# Patient Record
Sex: MALE | Race: WHITE
[De-identification: names, ages, dates, MRNs, and addresses within clinical notes are randomized per-mention and may not be internally consistent; named-entity substitution may affect disease eponyms.]

---

## 2018-01-25 ENCOUNTER — HOSPITAL ENCOUNTER (INPATIENT)
Dept: HOSPITAL 17 - NEPE | Age: 68
LOS: 18 days | Discharge: HOSPICE | DRG: 356 | End: 2018-02-12
Attending: INTERNAL MEDICINE | Admitting: INTERNAL MEDICINE
Payer: MEDICARE

## 2018-01-25 VITALS
OXYGEN SATURATION: 94 % | RESPIRATION RATE: 13 BRPM | TEMPERATURE: 97.6 F | SYSTOLIC BLOOD PRESSURE: 77 MMHG | DIASTOLIC BLOOD PRESSURE: 49 MMHG | HEART RATE: 60 BPM

## 2018-01-25 VITALS
SYSTOLIC BLOOD PRESSURE: 106 MMHG | RESPIRATION RATE: 18 BRPM | OXYGEN SATURATION: 98 % | HEART RATE: 91 BPM | DIASTOLIC BLOOD PRESSURE: 51 MMHG

## 2018-01-25 VITALS
OXYGEN SATURATION: 96 % | DIASTOLIC BLOOD PRESSURE: 50 MMHG | RESPIRATION RATE: 18 BRPM | HEART RATE: 86 BPM | SYSTOLIC BLOOD PRESSURE: 98 MMHG

## 2018-01-25 VITALS — BODY MASS INDEX: 28.24 KG/M2 | HEIGHT: 66 IN | WEIGHT: 175.71 LBS

## 2018-01-25 VITALS — OXYGEN SATURATION: 94 %

## 2018-01-25 DIAGNOSIS — L97.419: ICD-10-CM

## 2018-01-25 DIAGNOSIS — K20.9: ICD-10-CM

## 2018-01-25 DIAGNOSIS — I48.91: ICD-10-CM

## 2018-01-25 DIAGNOSIS — D69.6: ICD-10-CM

## 2018-01-25 DIAGNOSIS — D62: ICD-10-CM

## 2018-01-25 DIAGNOSIS — L97.519: ICD-10-CM

## 2018-01-25 DIAGNOSIS — Z96.642: ICD-10-CM

## 2018-01-25 DIAGNOSIS — Z86.73: ICD-10-CM

## 2018-01-25 DIAGNOSIS — T85.71XA: ICD-10-CM

## 2018-01-25 DIAGNOSIS — K26.4: Primary | ICD-10-CM

## 2018-01-25 DIAGNOSIS — B37.9: ICD-10-CM

## 2018-01-25 DIAGNOSIS — F54: ICD-10-CM

## 2018-01-25 DIAGNOSIS — I12.0: ICD-10-CM

## 2018-01-25 DIAGNOSIS — M19.90: ICD-10-CM

## 2018-01-25 DIAGNOSIS — F17.210: ICD-10-CM

## 2018-01-25 DIAGNOSIS — E11.621: ICD-10-CM

## 2018-01-25 DIAGNOSIS — D63.8: ICD-10-CM

## 2018-01-25 DIAGNOSIS — Z99.2: ICD-10-CM

## 2018-01-25 DIAGNOSIS — A41.9: ICD-10-CM

## 2018-01-25 DIAGNOSIS — K65.9: ICD-10-CM

## 2018-01-25 DIAGNOSIS — L89.151: ICD-10-CM

## 2018-01-25 DIAGNOSIS — I95.9: ICD-10-CM

## 2018-01-25 DIAGNOSIS — F32.9: ICD-10-CM

## 2018-01-25 DIAGNOSIS — J90: ICD-10-CM

## 2018-01-25 DIAGNOSIS — I70.8: ICD-10-CM

## 2018-01-25 DIAGNOSIS — I27.20: ICD-10-CM

## 2018-01-25 DIAGNOSIS — N18.6: ICD-10-CM

## 2018-01-25 DIAGNOSIS — G89.4: ICD-10-CM

## 2018-01-25 DIAGNOSIS — G93.40: ICD-10-CM
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
## 2018-01-25 DIAGNOSIS — E11.22: ICD-10-CM

## 2018-01-25 DIAGNOSIS — L97.429: ICD-10-CM

## 2018-01-25 DIAGNOSIS — J96.00: ICD-10-CM

## 2018-01-25 DIAGNOSIS — I87.2: ICD-10-CM

## 2018-01-25 DIAGNOSIS — K29.70: ICD-10-CM

## 2018-01-25 DIAGNOSIS — I87.8: ICD-10-CM

## 2018-01-25 LAB
ALBUMIN SERPL-MCNC: 1.4 GM/DL (ref 3.4–5)
ALP SERPL-CCNC: 414 U/L (ref 45–117)
ALT SERPL-CCNC: 17 U/L (ref 12–78)
AST SERPL-CCNC: 22 U/L (ref 15–37)
BASOPHILS # BLD AUTO: 0 TH/MM3 (ref 0–0.2)
BASOPHILS NFR BLD: 0.2 % (ref 0–2)
BILIRUB SERPL-MCNC: 0.4 MG/DL (ref 0.2–1)
BUN SERPL-MCNC: 57 MG/DL (ref 7–18)
CALCIUM SERPL-MCNC: 7.2 MG/DL (ref 8.5–10.1)
CALCIUM TP COR SERPL-MCNC: 8.2 MG/DL (ref 8.5–10.1)
CHLORIDE SERPL-SCNC: 100 MEQ/L (ref 98–107)
CREAT SERPL-MCNC: 5.39 MG/DL (ref 0.6–1.3)
EOSINOPHIL # BLD: 0 TH/MM3 (ref 0–0.4)
EOSINOPHIL NFR BLD: 0.3 % (ref 0–4)
ERYTHROCYTE [DISTWIDTH] IN BLOOD BY AUTOMATED COUNT: 17.7 % (ref 11.6–17.2)
GFR SERPLBLD BASED ON 1.73 SQ M-ARVRAT: 11 ML/MIN (ref 89–?)
GLUCOSE SERPL-MCNC: 163 MG/DL (ref 74–106)
HCO3 BLD-SCNC: 30.9 MEQ/L (ref 21–32)
HCT VFR BLD CALC: 20.8 % (ref 39–51)
HGB BLD-MCNC: 6.7 GM/DL (ref 13–17)
INR PPP: 1.8 RATIO
LYMPHOCYTES # BLD AUTO: 0.4 TH/MM3 (ref 1–4.8)
LYMPHOCYTES NFR BLD AUTO: 3.9 % (ref 9–44)
MCH RBC QN AUTO: 33.4 PG (ref 27–34)
MCHC RBC AUTO-ENTMCNC: 31.9 % (ref 32–36)
MCV RBC AUTO: 104.8 FL (ref 80–100)
MONOCYTE #: 0.5 TH/MM3 (ref 0–0.9)
MONOCYTES NFR BLD: 4.7 % (ref 0–8)
NEUTROPHILS # BLD AUTO: 10.4 TH/MM3 (ref 1.8–7.7)
NEUTROPHILS NFR BLD AUTO: 90.9 % (ref 16–70)
PLATELET # BLD: 189 TH/MM3 (ref 150–450)
PMV BLD AUTO: 8 FL (ref 7–11)
PROT SERPL-MCNC: 5.2 GM/DL (ref 6.4–8.2)
PROTHROMBIN TIME: 18 SEC (ref 9.8–11.6)
RBC # BLD AUTO: 1.99 MIL/MM3 (ref 4.5–5.9)
SODIUM SERPL-SCNC: 139 MEQ/L (ref 136–145)
TROPONIN I SERPL-MCNC: 0.05 NG/ML (ref 0.02–0.05)
WBC # BLD AUTO: 11.4 TH/MM3 (ref 4–11)

## 2018-01-25 PROCEDURE — 84443 ASSAY THYROID STIM HORMONE: CPT

## 2018-01-25 PROCEDURE — 86901 BLOOD TYPING SEROLOGIC RH(D): CPT

## 2018-01-25 PROCEDURE — C9113 INJ PANTOPRAZOLE SODIUM, VIA: HCPCS

## 2018-01-25 PROCEDURE — 85384 FIBRINOGEN ACTIVITY: CPT

## 2018-01-25 PROCEDURE — 94664 DEMO&/EVAL PT USE INHALER: CPT

## 2018-01-25 PROCEDURE — 80162 ASSAY OF DIGOXIN TOTAL: CPT

## 2018-01-25 PROCEDURE — 96375 TX/PRO/DX INJ NEW DRUG ADDON: CPT

## 2018-01-25 PROCEDURE — 84439 ASSAY OF FREE THYROXINE: CPT

## 2018-01-25 PROCEDURE — 87070 CULTURE OTHR SPECIMN AEROBIC: CPT

## 2018-01-25 PROCEDURE — 85014 HEMATOCRIT: CPT

## 2018-01-25 PROCEDURE — 93306 TTE W/DOPPLER COMPLETE: CPT

## 2018-01-25 PROCEDURE — 84484 ASSAY OF TROPONIN QUANT: CPT

## 2018-01-25 PROCEDURE — 80048 BASIC METABOLIC PNL TOTAL CA: CPT

## 2018-01-25 PROCEDURE — 87040 BLOOD CULTURE FOR BACTERIA: CPT

## 2018-01-25 PROCEDURE — 86920 COMPATIBILITY TEST SPIN: CPT

## 2018-01-25 PROCEDURE — 86850 RBC ANTIBODY SCREEN: CPT

## 2018-01-25 PROCEDURE — P9016 RBC LEUKOCYTES REDUCED: HCPCS

## 2018-01-25 PROCEDURE — 82533 TOTAL CORTISOL: CPT

## 2018-01-25 PROCEDURE — 96366 THER/PROPH/DIAG IV INF ADDON: CPT

## 2018-01-25 PROCEDURE — 85610 PROTHROMBIN TIME: CPT

## 2018-01-25 PROCEDURE — 83605 ASSAY OF LACTIC ACID: CPT

## 2018-01-25 PROCEDURE — C1769 GUIDE WIRE: HCPCS

## 2018-01-25 PROCEDURE — 96365 THER/PROPH/DIAG IV INF INIT: CPT

## 2018-01-25 PROCEDURE — P9047 ALBUMIN (HUMAN), 25%, 50ML: HCPCS

## 2018-01-25 PROCEDURE — 84155 ASSAY OF PROTEIN SERUM: CPT

## 2018-01-25 PROCEDURE — 83036 HEMOGLOBIN GLYCOSYLATED A1C: CPT

## 2018-01-25 PROCEDURE — 76937 US GUIDE VASCULAR ACCESS: CPT

## 2018-01-25 PROCEDURE — 87102 FUNGUS ISOLATION CULTURE: CPT

## 2018-01-25 PROCEDURE — 87206 SMEAR FLUORESCENT/ACID STAI: CPT

## 2018-01-25 PROCEDURE — 94150 VITAL CAPACITY TEST: CPT

## 2018-01-25 PROCEDURE — 82948 REAGENT STRIP/BLOOD GLUCOSE: CPT

## 2018-01-25 PROCEDURE — 36430 TRANSFUSION BLD/BLD COMPNT: CPT

## 2018-01-25 PROCEDURE — 85027 COMPLETE CBC AUTOMATED: CPT

## 2018-01-25 PROCEDURE — 80069 RENAL FUNCTION PANEL: CPT

## 2018-01-25 PROCEDURE — 80053 COMPREHEN METABOLIC PANEL: CPT

## 2018-01-25 PROCEDURE — C1887 CATHETER, GUIDING: HCPCS

## 2018-01-25 PROCEDURE — 36556 INSERT NON-TUNNEL CV CATH: CPT

## 2018-01-25 PROCEDURE — 83735 ASSAY OF MAGNESIUM: CPT

## 2018-01-25 PROCEDURE — 82272 OCCULT BLD FECES 1-3 TESTS: CPT

## 2018-01-25 PROCEDURE — 86900 BLOOD TYPING SEROLOGIC ABO: CPT

## 2018-01-25 PROCEDURE — 85007 BL SMEAR W/DIFF WBC COUNT: CPT

## 2018-01-25 PROCEDURE — 85730 THROMBOPLASTIN TIME PARTIAL: CPT

## 2018-01-25 PROCEDURE — 87205 SMEAR GRAM STAIN: CPT

## 2018-01-25 PROCEDURE — 71045 X-RAY EXAM CHEST 1 VIEW: CPT

## 2018-01-25 PROCEDURE — 85018 HEMOGLOBIN: CPT

## 2018-01-25 PROCEDURE — 85025 COMPLETE CBC W/AUTO DIFF WBC: CPT

## 2018-01-25 PROCEDURE — C1894 INTRO/SHEATH, NON-LASER: HCPCS

## 2018-01-25 PROCEDURE — 82550 ASSAY OF CK (CPK): CPT

## 2018-01-25 PROCEDURE — 87116 MYCOBACTERIA CULTURE: CPT

## 2018-01-25 PROCEDURE — 88312 SPECIAL STAINS GROUP 1: CPT

## 2018-01-25 PROCEDURE — 93005 ELECTROCARDIOGRAM TRACING: CPT

## 2018-01-25 PROCEDURE — 88305 TISSUE EXAM BY PATHOLOGIST: CPT

## 2018-01-25 PROCEDURE — 87015 SPECIMEN INFECT AGNT CONCNTJ: CPT

## 2018-01-25 PROCEDURE — 36245 INS CATH ABD/L-EXT ART 1ST: CPT

## 2018-01-25 PROCEDURE — 84100 ASSAY OF PHOSPHORUS: CPT

## 2018-01-25 PROCEDURE — 36246 INS CATH ABD/L-EXT ART 2ND: CPT

## 2018-01-25 PROCEDURE — 96374 THER/PROPH/DIAG INJ IV PUSH: CPT

## 2018-01-25 PROCEDURE — 84481 FREE ASSAY (FT-3): CPT

## 2018-01-25 PROCEDURE — 75726 ARTERY X-RAYS ABDOMEN: CPT

## 2018-01-25 PROCEDURE — 75774 ARTERY X-RAY EACH VESSEL: CPT

## 2018-01-25 PROCEDURE — 90935 HEMODIALYSIS ONE EVALUATION: CPT

## 2018-01-25 PROCEDURE — 87641 MR-STAPH DNA AMP PROBE: CPT

## 2018-01-25 RX ADMIN — SODIUM CHLORIDE ONE MG: 900 IRRIGANT IRRIGATION at 16:57

## 2018-01-25 RX ADMIN — NOREPINEPHRINE BITARTRATE PRN MLS/HR: 1 INJECTION INTRAVENOUS at 17:47

## 2018-01-25 RX ADMIN — DEXTROSE MONOHYDRATE SCH MLS/HR: 5 INJECTION, SOLUTION INTRAVENOUS at 16:56

## 2018-01-25 RX ADMIN — SODIUM CHLORIDE ONE MG: 900 IRRIGANT IRRIGATION at 17:46

## 2018-01-25 NOTE — PD
HPI


Chief Complaint:  Cardiac Complaint


Time Seen by Provider:  16:56


Travel History


International Travel<30 days:  No


Contact w/Intl Traveler<30days:  No


Traveled to known affect area:  No





History of Present Illness


HPI


This is a 67-year-old male with a history of end-stage renal disease, 

peritoneal dialysis dependent, who presents here from dialysis center with 

profound hypotension.  The patient apparently was getting ready for peritoneal 

dialysis when he was noted to be hypotensive and lethargic.  911 was called and 

he was transported here emergency.  When patient arrived, he was hypotensive 

with a blood pressure in the 70s systolic.  His heart rate was in the low 100s 

diastolic.  The patient was awake and able to answer questions.  He denied any 

pain.  He was able to state that he was incontinent to stool.  There is no 

further history given.





PFSH


Past Medical History


Atrial Fibrillation:  Yes


Cancer:  No


Cardiovascular Problems:  Yes (a fib)


Diabetes:  Yes


Patient Takes Glucophage:  Yes (unknown)


Endocrine:  Yes


Genitourinary:  Yes (kidney failure left AV fistula, peritoneal dialysis 

catheter)


Hepatitis:  No


Hiatal Hernia:  No


Immune Disorder:  No


Medical other:  Yes


Musculoskeletal:  Yes (minor arthritis)


Neurologic:  Yes (hx strokes)


Psychiatric:  No


Reproductive:  No


Respiratory:  No


Renal Failure:  Yes


Thyroid Disease:  No





Past Surgical History


Abdominal Surgery:  Yes (appendectomy)


Appendectomy:  Yes


Body Medical Devices:  right double lumen catheter


Cardiac Surgery:  No


Ear Surgery:  No


Endocrine Surgery:  No


Eye Surgery:  Yes (CATARACTS REMOVED WITH LENS IMPLANTS)


Genitourinary Surgery:  No


Joint Replacement:  Yes (left hip)


Oral Surgery:  No


Pacemaker:  No


Thoracic Surgery:  Yes (double lumen catheter on the right side)





Social History


Alcohol Use:  No


Tobacco Use:  No


Substance Use:  No





Allergies-Medications


(Allergen,Severity, Reaction):  


Coded Allergies:  


     morphine (Unverified  Allergy, Severe, anaphylactic, 1/25/18)


Reported Meds & Prescriptions





Reported Meds & Active Scripts


Active


Active Prescriptions or Reported Medications Unobtainable    








Review of Systems


ROS Limitations:  Clinical Condition (Review of systems may not be extremely 

accurate secondary to the patient's clinical situation.)


Except as stated in HPI:  all other systems reviewed are Neg


General / Constitutional:  No: Fever, Chills


HENT:  No: Headaches, Lightheadedness, Neck Stiffness, Neck Pain


Cardiovascular:  No: Chest Pain or Discomfort, Palpitations


Respiratory:  No: Cough, Shortness of Breath


Gastrointestinal:  Positive: Diarrhea, No: Nausea, Vomiting


Genitourinary:  Positive: Other (Patient reports that he makes trace amount of 

urine.)


Musculoskeletal:  Positive: Weakness (Generalized), No: Pain


Neurologic:  Positive: Weakness, No: Headache (Generalized)





Physical Exam


Narrative


GENERAL: Pale ill-appearing male in no acute respiratory distress. 


SKIN: Focused skin assessment cool and dry.


HEAD: Atraumatic. Normocephalic. 


EYES:  No scleral icterus. No injection or drainage. 


ENT: No nasal bleeding or discharge.  Mucous membranes pink and dry..


NECK: Trachea midline.  Supple.


CARDIOVASCULAR: Heart rate in the 80s.  Irregularly irregular consistent with 

A. fib.


RESPIRATORY: No accessory muscle use. Clear to auscultation. Breath sounds 

equal bilaterally.  Decreased respiratory effort.


GASTROINTESTINAL: Abdomen soft, nondistended.  There is a Vas-Cath in place.  

There is no drainage noted around it.


MUSCULOSKELETAL: Patient has a healing heel ulcer.  This is noted on his right 

lower extremity.  Venous stasis changes noted to his bilateral lower 

extremities.


NEUROLOGICAL: Awake and weak and mildly confused. No obvious cranial nerve 

deficits.  Minimal musculoskeletal movement.


BACK: Stage I/early to decubitus in his sacral area.  There is a large amount 

of maroon colored stool noted in his rectum area.  This was grossly positive 

for heme.  There were large clots noted as well.





Data


Data


Last Documented VS





Vital Signs








  Date Time  Temp Pulse Resp B/P (MAP) Pulse Ox O2 Delivery O2 Flow Rate FiO2


 


1/25/18 19:13  86 18 98/50 (66) 96 Room Air  


 


1/25/18 17:00       2.00 


 


1/25/18 16:57 97.6       








Orders





 Orders


Norepinephrine Inj (Levophed Inj) (1/25/18 16:57)


Sepsis Workup Initiated (1/25/18 )


Electrocardiogram (1/25/18 16:56)


Complete Blood Count With Diff (1/25/18 16:56)


Comprehensive Metabolic Panel (1/25/18 16:56)


Prothrombin Time / Inr (Pt) (1/25/18 16:56)


Act Partial Throm Time (Ptt) (1/25/18 16:56)


Lactic Acid Sepsis Protocol (1/25/18 16:56)


Ckmb (Isoenzyme) Profile (1/25/18 16:56)


Troponin I (1/25/18 16:56)


Urinalysis - C+S If Indicated (1/25/18 16:56)


Blood Culture (1/25/18 16:56)


Chest, Single Ap (1/25/18 16:56)


Blood Glucose (1/25/18 16:56)


Ecg Monitoring (1/25/18 16:56)


Iv Access Insert/Monitor (1/25/18 16:56)


Oximetry (1/25/18 16:56)


Oxygen Administration (1/25/18 16:56)


Norepinephrine-Dextrose Drip (Levophed-D (1/25/18 17:00)


Dextrose 5% In [place]... W/Vasopressin Inj (1/25/18 16:56)


Epinephrine (1:1000) Inj (Adrenalin (1:1 (1/25/18 17:00)


Blood Culture (1/25/18 16:56)


Norepinephrine-Dextrose Drip (Levophed-D (1/25/18 17:15)


Type And Screen (1/25/18 18:02)


Red Blood Cells (Rbc) (1/25/18 18:02)





Labs





Laboratory Tests








Test


  1/25/18


17:25


 


White Blood Count 11.4 TH/MM3 


 


Red Blood Count 1.99 MIL/MM3 


 


Hemoglobin 6.7 GM/DL 


 


Hematocrit 20.8 % 


 


Mean Corpuscular Volume 104.8 FL 


 


Mean Corpuscular Hemoglobin 33.4 PG 


 


Mean Corpuscular Hemoglobin


Concent 31.9 % 


 


 


Red Cell Distribution Width 17.7 % 


 


Platelet Count 189 TH/MM3 


 


Mean Platelet Volume 8.0 FL 


 


Neutrophils (%) (Auto) 90.9 % 


 


Lymphocytes (%) (Auto) 3.9 % 


 


Monocytes (%) (Auto) 4.7 % 


 


Eosinophils (%) (Auto) 0.3 % 


 


Basophils (%) (Auto) 0.2 % 


 


Neutrophils # (Auto) 10.4 TH/MM3 


 


Lymphocytes # (Auto) 0.4 TH/MM3 


 


Monocytes # (Auto) 0.5 TH/MM3 


 


Eosinophils # (Auto) 0.0 TH/MM3 


 


Basophils # (Auto) 0.0 TH/MM3 


 


CBC Comment DIFF FINAL 


 


Differential Comment  


 


Prothrombin Time 18.0 SEC 


 


Prothromb Time International


Ratio 1.8 RATIO 


 


 


Activated Partial


Thromboplast Time 29.4 SEC 


 


 


Blood Urea Nitrogen 57 MG/DL 


 


Creatinine 5.39 MG/DL 


 


Random Glucose 163 MG/DL 


 


Total Protein 5.2 GM/DL 


 


Albumin 1.4 GM/DL 


 


Calcium Level 7.2 MG/DL 


 


Alkaline Phosphatase 414 U/L 


 


Aspartate Amino Transf


(AST/SGOT) 22 U/L 


 


 


Alanine Aminotransferase


(ALT/SGPT) 17 U/L 


 


 


Total Bilirubin 0.4 MG/DL 


 


Sodium Level 139 MEQ/L 


 


Potassium Level 4.5 MEQ/L 


 


Chloride Level 100 MEQ/L 


 


Carbon Dioxide Level 30.9 MEQ/L 


 


Anion Gap 8 MEQ/L 


 


Estimat Glomerular Filtration


Rate 11 ML/MIN 


 


 


Lactic Acid Level 1.3 mmol/L 


 


Protein Corrected Calcium 8.2 MG/DL 


 


Total Creatine Kinase 45 U/L 


 


Troponin I 0.05 NG/ML 











MDM


Medical Decision Making


Medical Screen Exam Complete:  Yes


Emergency Medical Condition:  Yes


Differential Diagnosis


GI bleed versus metabolic derangement versus sepsis


Narrative Course


67-year-old gentleman with history of end-stage renal disease, atrial 

fibrillation, who presents here from dialysis center and profound hypotension.  

The patient was unable to give clear history.  The patient was started on a 

Levophed drip after a central line was placed.  He was noted to have profound 

bloody stool.  Patient's wife came in and stated that he was a DO NOT 

RESUSCITATE.  She states she is power of  and wishes to have no further 

heroic treatment.  She has requested that we not transfuse blood and 

discontinue the vasopressors.  At that time, I discussed with her that if we 

were to do this then it would be best to have hospice come in and evaluate him 

for placement.  She was amenable to this.  There is a call out to hospice for 

evaluation and placement.


Critical Care Narrative


Aggregate critical care time was 60 minutes. Time to perform other separately 

billable procedures was not included in the critical care time. My time did not 

include minutes spent treating any other patients simultaneously or on 

activities that did not directly contribute to the patient's treatment.  





The services I provided to this patient were to treat and/or prevent clinically 

significant deterioration that could result in: Death





I provided critical care services requiring my management, as noted below:


Chart data review, documentation time, medication orders and management, vital 

sign assessments/reviewing monitor data, ordering and reviewing lab tests, 

ordering and interpreting/reviewing x-rays and diagnostic studies, care of the 

patient and discussion of the patient with the admitting physicians.





Procedures


**Procedure Narrative**


CENTRAL VENOUS LINE: The site was prepped with Betadine and sterilely draped. 

It was infiltrated with 1% lidocaine plain. The deep vein was cannulated using 

normal Seldinger technique.  A central line was placed in the right internal 

jugular site and secured with simple interrupted suture. The site was sterilely 

dressed.  The patient tolerated the procedure well.





Diagnosis





 Primary Impression:  


 GI bleed


 Additional Impressions:  


 Hypotension


 History of renal failure


 Do not resuscitate


Scripts


Unable to Obtain Active Prescriptions or Reported Meds











Esa Gutierrez MD Jan 25, 2018 19:01

## 2018-01-25 NOTE — RADRPT
EXAM DATE/TIME:  01/25/2018 17:25 

 

[facility] COMPARISON:     

No previous studies available for comparison.

 

                     

INDICATIONS :     

Confirm central line placement.

                     

 

MEDICAL HISTORY :     

Stroke.       AFIB, Stage 4 Renal Disease, Kidney failure, Low blood pressure.   

 

SURGICAL HISTORY :     

Appendectomy.   Double lumen catheter on right side of chest.

 

ENCOUNTER:     

Initial                                        

 

ACUITY:     

1 day      

 

PAIN SCORE:     

Non-responsive.

 

LOCATION:     

Bilateral chest 

 

FINDINGS:     

A single view of the chest demonstrates the lungs to be symmetrically, but under aerated with no conf
luent infiltrate. Accounting for low lung volumes, heart size is borderline prominent a well compensa
dilcia. Right IJ central venous catheter projects over the central venous system. Osseous structures are
 intact.

 

CONCLUSION:     

1. Right IJ central venous catheter with the tip projecting over the central venous system. No pneumo
thorax.

2. Lungs are hypoinflated but clear.

3. Borderline prominent but well compensated heart

 

 

 

 Stephen Rome MD on January 25, 2018 at 17:52           

Board Certified Radiologist.

 This report was verified electronically.

## 2018-01-26 VITALS
DIASTOLIC BLOOD PRESSURE: 50 MMHG | HEART RATE: 77 BPM | OXYGEN SATURATION: 98 % | RESPIRATION RATE: 18 BRPM | SYSTOLIC BLOOD PRESSURE: 90 MMHG

## 2018-01-26 VITALS
SYSTOLIC BLOOD PRESSURE: 85 MMHG | TEMPERATURE: 97.5 F | RESPIRATION RATE: 11 BRPM | DIASTOLIC BLOOD PRESSURE: 50 MMHG | OXYGEN SATURATION: 99 % | HEART RATE: 92 BPM

## 2018-01-26 VITALS
HEART RATE: 86 BPM | OXYGEN SATURATION: 100 % | RESPIRATION RATE: 18 BRPM | DIASTOLIC BLOOD PRESSURE: 56 MMHG | SYSTOLIC BLOOD PRESSURE: 101 MMHG

## 2018-01-26 VITALS
OXYGEN SATURATION: 100 % | DIASTOLIC BLOOD PRESSURE: 51 MMHG | RESPIRATION RATE: 18 BRPM | HEART RATE: 83 BPM | SYSTOLIC BLOOD PRESSURE: 90 MMHG

## 2018-01-26 VITALS
HEART RATE: 66 BPM | TEMPERATURE: 96.7 F | RESPIRATION RATE: 20 BRPM | SYSTOLIC BLOOD PRESSURE: 98 MMHG | DIASTOLIC BLOOD PRESSURE: 56 MMHG

## 2018-01-26 VITALS
TEMPERATURE: 97.4 F | OXYGEN SATURATION: 100 % | RESPIRATION RATE: 16 BRPM | HEART RATE: 101 BPM | SYSTOLIC BLOOD PRESSURE: 93 MMHG | DIASTOLIC BLOOD PRESSURE: 55 MMHG

## 2018-01-26 VITALS
HEART RATE: 71 BPM | OXYGEN SATURATION: 100 % | DIASTOLIC BLOOD PRESSURE: 57 MMHG | SYSTOLIC BLOOD PRESSURE: 100 MMHG | RESPIRATION RATE: 18 BRPM

## 2018-01-26 VITALS
OXYGEN SATURATION: 98 % | HEART RATE: 81 BPM | SYSTOLIC BLOOD PRESSURE: 90 MMHG | DIASTOLIC BLOOD PRESSURE: 51 MMHG | RESPIRATION RATE: 18 BRPM

## 2018-01-26 VITALS — DIASTOLIC BLOOD PRESSURE: 62 MMHG | HEART RATE: 99 BPM | SYSTOLIC BLOOD PRESSURE: 94 MMHG | TEMPERATURE: 98.9 F

## 2018-01-26 VITALS — HEART RATE: 87 BPM

## 2018-01-26 VITALS — HEART RATE: 92 BPM

## 2018-01-26 VITALS
HEART RATE: 77 BPM | DIASTOLIC BLOOD PRESSURE: 52 MMHG | SYSTOLIC BLOOD PRESSURE: 100 MMHG | OXYGEN SATURATION: 94 % | RESPIRATION RATE: 18 BRPM

## 2018-01-26 VITALS — OXYGEN SATURATION: 99 %

## 2018-01-26 VITALS — HEART RATE: 84 BPM

## 2018-01-26 VITALS
TEMPERATURE: 97.5 F | DIASTOLIC BLOOD PRESSURE: 53 MMHG | RESPIRATION RATE: 11 BRPM | OXYGEN SATURATION: 97 % | SYSTOLIC BLOOD PRESSURE: 87 MMHG | HEART RATE: 92 BPM

## 2018-01-26 VITALS — HEART RATE: 101 BPM

## 2018-01-26 VITALS — HEART RATE: 160 BPM

## 2018-01-26 VITALS — TEMPERATURE: 98.7 F | DIASTOLIC BLOOD PRESSURE: 62 MMHG | HEART RATE: 85 BPM | SYSTOLIC BLOOD PRESSURE: 94 MMHG

## 2018-01-26 VITALS
DIASTOLIC BLOOD PRESSURE: 45 MMHG | SYSTOLIC BLOOD PRESSURE: 90 MMHG | OXYGEN SATURATION: 97 % | RESPIRATION RATE: 16 BRPM | HEART RATE: 78 BPM

## 2018-01-26 VITALS — HEART RATE: 80 BPM | SYSTOLIC BLOOD PRESSURE: 91 MMHG | DIASTOLIC BLOOD PRESSURE: 53 MMHG | TEMPERATURE: 97.8 F

## 2018-01-26 VITALS — HEART RATE: 91 BPM

## 2018-01-26 VITALS — HEART RATE: 85 BPM

## 2018-01-26 VITALS — HEART RATE: 104 BPM

## 2018-01-26 LAB
ACANTHOCYTES BLD QL SMEAR: (no result)
ALBUMIN SERPL-MCNC: 1.4 GM/DL (ref 3.4–5)
ALP SERPL-CCNC: 342 U/L (ref 45–117)
ALT SERPL-CCNC: 16 U/L (ref 12–78)
AST SERPL-CCNC: 20 U/L (ref 15–37)
BASO STIPL BLD QL SMEAR: (no result)
BASOPHILS # BLD AUTO: 0 TH/MM3 (ref 0–0.2)
BASOPHILS # BLD AUTO: 0 TH/MM3 (ref 0–0.2)
BASOPHILS NFR BLD: 0.1 % (ref 0–2)
BASOPHILS NFR BLD: 0.3 % (ref 0–2)
BILIRUB SERPL-MCNC: 0.7 MG/DL (ref 0.2–1)
BUN SERPL-MCNC: 62 MG/DL (ref 7–18)
CALCIUM SERPL-MCNC: 7.1 MG/DL (ref 8.5–10.1)
CALCIUM TP COR SERPL-MCNC: 8.4 MG/DL (ref 8.5–10.1)
CHLORIDE SERPL-SCNC: 102 MEQ/L (ref 98–107)
CREAT SERPL-MCNC: 5.31 MG/DL (ref 0.6–1.3)
EOSINOPHIL # BLD: 0 TH/MM3 (ref 0–0.4)
EOSINOPHIL # BLD: 0 TH/MM3 (ref 0–0.4)
EOSINOPHIL NFR BLD: 0.2 % (ref 0–4)
EOSINOPHIL NFR BLD: 0.3 % (ref 0–4)
ERYTHROCYTE [DISTWIDTH] IN BLOOD BY AUTOMATED COUNT: 17.8 % (ref 11.6–17.2)
ERYTHROCYTE [DISTWIDTH] IN BLOOD BY AUTOMATED COUNT: 18.6 % (ref 11.6–17.2)
GFR SERPLBLD BASED ON 1.73 SQ M-ARVRAT: 11 ML/MIN (ref 89–?)
GLUCOSE SERPL-MCNC: 157 MG/DL (ref 74–106)
HCO3 BLD-SCNC: 27.7 MEQ/L (ref 21–32)
HCT VFR BLD CALC: 20 % (ref 39–51)
HCT VFR BLD CALC: 26.5 % (ref 39–51)
HCT VFR BLD CALC: 28.8 % (ref 39–51)
HGB BLD-MCNC: 6.4 GM/DL (ref 13–17)
HGB BLD-MCNC: 8.9 GM/DL (ref 13–17)
HGB BLD-MCNC: 9.6 GM/DL (ref 13–17)
INR PPP: 1.5 RATIO
INR PPP: 1.6 RATIO
LYMPHOCYTES # BLD AUTO: 0.4 TH/MM3 (ref 1–4.8)
LYMPHOCYTES # BLD AUTO: 0.5 TH/MM3 (ref 1–4.8)
LYMPHOCYTES NFR BLD AUTO: 3.9 % (ref 9–44)
LYMPHOCYTES NFR BLD AUTO: 4 % (ref 9–44)
LYMPHOCYTES: 6 % (ref 9–44)
MCH RBC QN AUTO: 32.1 PG (ref 27–34)
MCH RBC QN AUTO: 33.4 PG (ref 27–34)
MCHC RBC AUTO-ENTMCNC: 31.8 % (ref 32–36)
MCHC RBC AUTO-ENTMCNC: 33.5 % (ref 32–36)
MCV RBC AUTO: 104.9 FL (ref 80–100)
MCV RBC AUTO: 95.8 FL (ref 80–100)
MONOCYTE #: 0.4 TH/MM3 (ref 0–0.9)
MONOCYTE #: 0.4 TH/MM3 (ref 0–0.9)
MONOCYTES NFR BLD: 3.5 % (ref 0–8)
MONOCYTES NFR BLD: 4.3 % (ref 0–8)
MONOCYTES: 4 % (ref 0–8)
MYELOCYTES NFR BLD: 1 % (ref 0–0)
NEUTROPHILS # BLD AUTO: 10.6 TH/MM3 (ref 1.8–7.7)
NEUTROPHILS # BLD AUTO: 8.7 TH/MM3 (ref 1.8–7.7)
NEUTROPHILS NFR BLD AUTO: 91.3 % (ref 16–70)
NEUTROPHILS NFR BLD AUTO: 92.1 % (ref 16–70)
NEUTS BAND # BLD MANUAL: 10.4 TH/MM3 (ref 1.8–7.7)
NEUTS BAND NFR BLD: 3 % (ref 0–6)
NEUTS SEG NFR BLD MANUAL: 86 % (ref 16–70)
NUCLEATED RED BLOOD CELL: 1 (ref 0–0)
PLATELET # BLD: 136 TH/MM3 (ref 150–450)
PLATELET # BLD: 204 TH/MM3 (ref 150–450)
PMV BLD AUTO: 7.6 FL (ref 7–11)
PMV BLD AUTO: 7.7 FL (ref 7–11)
POLYCHROMASIA BLD QL SMEAR: 5.5 % (ref 0–1.9)
PROT SERPL-MCNC: 4.7 GM/DL (ref 6.4–8.2)
PROTHROMBIN TIME: 15.2 SEC (ref 9.8–11.6)
PROTHROMBIN TIME: 16.4 SEC (ref 9.8–11.6)
RBC # BLD AUTO: 1.9 MIL/MM3 (ref 4.5–5.9)
RBC # BLD AUTO: 2.77 MIL/MM3 (ref 4.5–5.9)
SODIUM SERPL-SCNC: 138 MEQ/L (ref 136–145)
WBC # BLD AUTO: 11.6 TH/MM3 (ref 4–11)
WBC # BLD AUTO: 9.6 TH/MM3 (ref 4–11)
WBC NRBC COR # BLD: 1 /100 WBC (ref 0–0)

## 2018-01-26 PROCEDURE — 5A1D70Z PERFORMANCE OF URINARY FILTRATION, INTERMITTENT, LESS THAN 6 HOURS PER DAY: ICD-10-PCS | Performed by: INTERNAL MEDICINE

## 2018-01-26 PROCEDURE — 0DB98ZX EXCISION OF DUODENUM, VIA NATURAL OR ARTIFICIAL OPENING ENDOSCOPIC, DIAGNOSTIC: ICD-10-PCS | Performed by: INTERNAL MEDICINE

## 2018-01-26 PROCEDURE — 05HM33Z INSERTION OF INFUSION DEVICE INTO RIGHT INTERNAL JUGULAR VEIN, PERCUTANEOUS APPROACH: ICD-10-PCS | Performed by: INTERNAL MEDICINE

## 2018-01-26 PROCEDURE — 30233N1 TRANSFUSION OF NONAUTOLOGOUS RED BLOOD CELLS INTO PERIPHERAL VEIN, PERCUTANEOUS APPROACH: ICD-10-PCS

## 2018-01-26 PROCEDURE — 0DB78ZX EXCISION OF STOMACH, PYLORUS, VIA NATURAL OR ARTIFICIAL OPENING ENDOSCOPIC, DIAGNOSTIC: ICD-10-PCS | Performed by: INTERNAL MEDICINE

## 2018-01-26 RX ADMIN — HUMAN INSULIN SCH: 100 INJECTION, SOLUTION SUBCUTANEOUS at 20:45

## 2018-01-26 RX ADMIN — Medication SCH ML: at 21:00

## 2018-01-26 RX ADMIN — Medication SCH ML: at 09:00

## 2018-01-26 RX ADMIN — ALBUMIN HUMAN PRN MLS/HR: 0.25 SOLUTION INTRAVENOUS at 20:09

## 2018-01-26 RX ADMIN — HUMAN INSULIN SCH: 100 INJECTION, SOLUTION SUBCUTANEOUS at 08:45

## 2018-01-26 RX ADMIN — GELATIN ABSORBABLE SPONGE 12-7 MM PRN FOAM: 12-7 MISC at 20:10

## 2018-01-26 RX ADMIN — PANTOPRAZOLE SODIUM SCH MLS/HR: 40 INJECTION, POWDER, FOR SOLUTION INTRAVENOUS at 04:51

## 2018-01-26 RX ADMIN — TAZOBACTAM SODIUM AND PIPERACILLIN SODIUM SCH MLS/HR: 250; 2 INJECTION, SOLUTION INTRAVENOUS at 19:00

## 2018-01-26 RX ADMIN — STANDARDIZED SENNA CONCENTRATE AND DOCUSATE SODIUM SCH TAB: 8.6; 5 TABLET, FILM COATED ORAL at 09:00

## 2018-01-26 RX ADMIN — EPOETIN ALFA PRN UNITS: 10000 SOLUTION INTRAVENOUS; SUBCUTANEOUS at 20:09

## 2018-01-26 RX ADMIN — SODIUM CHLORIDE SCH MLS/HR: 900 INJECTION INTRAVENOUS at 12:55

## 2018-01-26 RX ADMIN — PANTOPRAZOLE SODIUM SCH MLS/HR: 40 INJECTION, POWDER, FOR SOLUTION INTRAVENOUS at 23:35

## 2018-01-26 RX ADMIN — CHLORHEXIDINE GLUCONATE SCH PACK: 500 CLOTH TOPICAL at 04:00

## 2018-01-26 RX ADMIN — STANDARDIZED SENNA CONCENTRATE AND DOCUSATE SODIUM SCH TAB: 8.6; 5 TABLET, FILM COATED ORAL at 21:00

## 2018-01-26 NOTE — PD.PROCEDR
GI Procedure


PROCEDURE PERFORMED


EGD with biopsy





INDICATION FOR PROCEDURE


Nausea vomiting, anemia, patient is known to have severe diabetes on 

hemodialysis





PROCEDURE:


The procedure, risks and benefits were discussed with Mr. Vega and informed


consent was obtained.  Anesthesia sedated him with Diprivan.  He was placed in 

the left lateral decubitus position.





EGD:


The Pentax videoscope was introduced through the oropharynx and advanced to the 

second portion of the duodenum under direct visualization. Retroflexion was 

performed in the stomach.  Biopsy from the antrum





ESTIMATED BLOOD LOSS:


None





SPECIMENS REMOVED:


Antrum, distal esophagus





COMPLICATIONS:


None





IMPRESSION:


Severe grade D esophagitis


Severe gastritis


Anemia could be related to chronic disease plus blood loss from the upper GI 

tract from above





PLAN:


no NSAIDs


protonix 40 mg po Q am


Follow-up biopsy


Better control of diabetes











Dajuan Diaz MD Jan 26, 2018 13:21

## 2018-01-26 NOTE — EKG
Date Performed: 01/25/2018       Time Performed: 16:57:23

 

PTAGE:      67 years

 

EKG:      ATRIAL FIBRILLATION INDETERMINATE AXIS RIGHT BUNDLE BRANCH BLOCK LOW QRS VOLTAGE ABNORMAL E
CG

 

PREVIOUS TRACING       : 06/03/2016 14.06 No change from previous tracing noted.

 

DOCTOR:   Alex Horta  Interpretating Date/Time  01/26/2018 09:57:17
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
## 2018-01-26 NOTE — PD.PROCEDR
GI Procedure


PROCEDURE PERFORMED


Upper endoscopy with biopsy





INDICATION FOR PROCEDURE


Anemia history of ulcer





PROCEDURE:


The procedure, risks and benefits were discussed with Mr. Vega and informed


consent was obtained.  Anesthesia sedated him with Diprivan.  He was placed in 

the left lateral decubitus position.





EGD:


The Pentax videoscope was introduced through the oropharynx and advanced to the 

second portion of the duodenum under direct visualization. Retroflexion was 

performed in the stomach.  Biopsy from the duodenum and from the antrum





ESTIMATED BLOOD LOSS:


None





SPECIMENS REMOVED:


Antrum, duodenal





COMPLICATIONS:


None





IMPRESSION:


Severe esophagitis grade D


Gastritis


2 large duodenal ulcer almost taking over the duodenal bulb and first portion 

biopsy was done to rule out malignancy most likely the reason for the anemia 

and the bleeding





PLAN:


No NSAIDs


Protonix 40 mg daily


Follow-up biopsy


Follow-up H&H with packed RBC as needed


Clear liquid advance as tolerated











Dajuan Diaz MD Jan 26, 2018 12:01

## 2018-01-26 NOTE — PD
Physical Exam


Narrative


Case signed out to me at shift change by Dr. Esa Gutierrez, status post 

resuscitation efforts central line placement and patient being put on Levophed 

blood pressure support.  Patient currently altered, patient's wife presented 

with incomplete documentation for DNR, as well as a request for patient to be 

transferred to hospice.  Hospice consulted, usual hospice nurse unavailable for 

consultation secondary to call,  from [facility] presented for 

evaluation.  Patient unable to give history at that time.  Some concerns among 

staff regarding patient's wife's capacity to understand provocations of 

transition to hospice care in patient's current state of hypotension, active 

probable GI bleed, and hypotension requiring pressors.  Patient repeatedly 

expressed concern about bringing the patient's dog in so that the patient could 

say goodbye to the family pet.


After some time on pressors, patient became awake and alert, able to 

communicate well and answer questions appropriately.  Upon interview with 

patient, patient states that he understands his chronic illnesses and 

chronically debilitated state, and realizes the potential inevitability of his 

diseases, but states that he is not ready to have medical support removed and 

go on hospice.  Patient also noted that he needs time to get his affairs in 

order.


Update discussed with hospitalist nurse evaluator who presented, who 

interviewed patient and agrees with plan.  


Case discussed with Dr. Junior from intensive care, patient is to be admitted 

with continued pressure support, transfusion of blood products as originally 

ordered by Dr. Gutierrez, with addition of 3 more units for a total 4.





Data


Data


Last Documented VS





Vital Signs








  Date Time  Temp Pulse Resp B/P (MAP) Pulse Ox O2 Delivery O2 Flow Rate FiO2


 


1/25/18 21:20  91 18 106/51 (69) 98 Nasal Cannula 2.00 


 


1/25/18 16:57 97.6       








Orders





 Orders


Norepinephrine Inj (Levophed Inj) (1/25/18 16:57)


Sepsis Workup Initiated (1/25/18 )


Electrocardiogram (1/25/18 16:56)


Complete Blood Count With Diff (1/25/18 16:56)


Comprehensive Metabolic Panel (1/25/18 16:56)


Prothrombin Time / Inr (Pt) (1/25/18 16:56)


Act Partial Throm Time (Ptt) (1/25/18 16:56)


Lactic Acid Sepsis Protocol (1/25/18 16:56)


Ckmb (Isoenzyme) Profile (1/25/18 16:56)


Troponin I (1/25/18 16:56)


Urinalysis - C+S If Indicated (1/25/18 16:56)


Blood Culture (1/25/18 16:56)


Chest, Single Ap (1/25/18 16:56)


Blood Glucose (1/25/18 16:56)


Ecg Monitoring (1/25/18 16:56)


Iv Access Insert/Monitor (1/25/18 16:56)


Oximetry (1/25/18 16:56)


Oxygen Administration (1/25/18 16:56)


Norepinephrine-Dextrose Drip (Levophed-D (1/25/18 17:00)


Dextrose 5% In [place]... W/Vasopressin Inj (1/25/18 16:56)


Epinephrine (1:1000) Inj (Adrenalin (1:1 (1/25/18 17:00)


Norepinephrine-Dextrose Drip (Levophed-D (1/25/18 17:15)


Type And Screen (1/25/18 18:02)


Red Blood Cells (Rbc) (1/25/18 18:02)


Hospice Consult (1/25/18 21:56)


Complete Blood Count With Diff (1/26/18 02:06)


Prothrombin Time / Inr (Pt) (1/26/18 02:06)


Act Partial Throm Time (Ptt) (1/26/18 02:06)


Red Blood Cells (Rbc) (1/26/18 02:08)


Blood Product Administration (1/26/18 02:08)


Sodium Chlor 0.9% 250 Ml Inj (Ns 250 Ml (1/26/18 02:15)


Admit Order (Ed Use Only) (1/26/18 02:08)





Labs





Laboratory Tests








Test


  1/25/18


17:25


 


White Blood Count 11.4 TH/MM3 


 


Red Blood Count 1.99 MIL/MM3 


 


Hemoglobin 6.7 GM/DL 


 


Hematocrit 20.8 % 


 


Mean Corpuscular Volume 104.8 FL 


 


Mean Corpuscular Hemoglobin 33.4 PG 


 


Mean Corpuscular Hemoglobin


Concent 31.9 % 


 


 


Red Cell Distribution Width 17.7 % 


 


Platelet Count 189 TH/MM3 


 


Mean Platelet Volume 8.0 FL 


 


Neutrophils (%) (Auto) 90.9 % 


 


Lymphocytes (%) (Auto) 3.9 % 


 


Monocytes (%) (Auto) 4.7 % 


 


Eosinophils (%) (Auto) 0.3 % 


 


Basophils (%) (Auto) 0.2 % 


 


Neutrophils # (Auto) 10.4 TH/MM3 


 


Lymphocytes # (Auto) 0.4 TH/MM3 


 


Monocytes # (Auto) 0.5 TH/MM3 


 


Eosinophils # (Auto) 0.0 TH/MM3 


 


Basophils # (Auto) 0.0 TH/MM3 


 


CBC Comment DIFF FINAL 


 


Differential Comment  


 


Prothrombin Time 18.0 SEC 


 


Prothromb Time International


Ratio 1.8 RATIO 


 


 


Activated Partial


Thromboplast Time 29.4 SEC 


 


 


Blood Urea Nitrogen 57 MG/DL 


 


Creatinine 5.39 MG/DL 


 


Random Glucose 163 MG/DL 


 


Total Protein 5.2 GM/DL 


 


Albumin 1.4 GM/DL 


 


Calcium Level 7.2 MG/DL 


 


Alkaline Phosphatase 414 U/L 


 


Aspartate Amino Transf


(AST/SGOT) 22 U/L 


 


 


Alanine Aminotransferase


(ALT/SGPT) 17 U/L 


 


 


Total Bilirubin 0.4 MG/DL 


 


Sodium Level 139 MEQ/L 


 


Potassium Level 4.5 MEQ/L 


 


Chloride Level 100 MEQ/L 


 


Carbon Dioxide Level 30.9 MEQ/L 


 


Anion Gap 8 MEQ/L 


 


Estimat Glomerular Filtration


Rate 11 ML/MIN 


 


 


Lactic Acid Level 1.3 mmol/L 


 


Protein Corrected Calcium 8.2 MG/DL 


 


Total Creatine Kinase 45 U/L 


 


Troponin I 0.05 NG/ML 











MDM


Medical Record Reviewed:  Yes


Supervised Visit with SABAS:  Yes


Differential Diagnosis


GI bleed, hypertension, end-stage renal disease, potential end-of-life care


Narrative Course


Patient admitted to ICU with palliative care consult pending.  Patient being 

actively transfused in ED.


Diagnosis





 Primary Impression:  


 GI bleed


 Qualified Codes:  K92.2 - Gastrointestinal hemorrhage, unspecified


 Additional Impressions:  


 Do not resuscitate


 History of renal failure


 Hypotension


 Qualified Codes:  I95.9 - Hypotension, unspecified





Admitting Information


Admitting Physician Requests:  Admit


Scripts


Unable to Obtain Active Prescriptions or Reported Meds











Ousmane Mosher MD Jan 26, 2018 04:00

## 2018-01-26 NOTE — PD.ID.CON
History of Present Illness


Service


ID


Consult Requested By


Dr Gómez


Reason for Consult


PD cath related peritonitis, candidal


Primary Care Physician


Carlita Hoang MD


Diagnoses:  


History of Present Illness


Pt is a very poor historian


AQ 68 yo male with multiple med problems including  ESRD on PD


He apparently has h/o 1-2 weeks of redness and discomfort aorund his PD cath 

that was placed  1.5 yrs ago


I talked to Dr Hoang, his nephrologist who tolm me that  his PD fluid grew out

  C.yaritza louis


Pt wa admitted yday severely hypotensive and lethargic at the dialysis center 

on 1/25 after 911 was called and patient was transferred to [facility] ER. 


 He   reportedly had significant melena and rectal bleeding following arrival.  


  Patient's wife arrived and wanted to make patient DNR status and did not want 

blood transfusions and wished transferring to hospice however subsequently 

patient woke up and wanted to continue aggressive care including blood 

transfusions.    


Blood clx are negative @ 1 day


At the time I saw the pt he was on low dose of Levaphed





Review of Systems


ROS Limitations:  Poor Historian





Past Family Social History


Allergies:  


Coded Allergies:  


     morphine (Unverified  Allergy, Severe, anaphylactic, 1/25/18)


Past Medical History


 


End-stage renal disease with peritoneal dialysis


Atrial fibrillation


Diabetes


Renal failure with left AV fistula


Arthritis


Chronic pain


History of strokes


Past Surgical History


Past Surgical History


Appendectomy


Cataracts removed with lens implants


Right peritoneal double-lumen catheter


AVF


Active Ordered Medications


Medications where reviewed in EMR


Antibiotics Include:  micafungin, zosyn


Family History


reviewed


non contributory


Social History


No Tobacco.


No ETOH.


No Illicit Drugs.





Physical Exam


Vital Signs





Vital Signs








  Date Time  Temp Pulse Resp B/P (MAP) Pulse Ox O2 Delivery O2 Flow Rate FiO2


 


1/26/18 12:30 97.6 96 16 98/55 (69) 96 Nasal Cannula 3 


 


1/26/18 12:15  99 16 97/52 (67) 99 Nasal Cannula 4 


 


1/26/18 12:05 97.6 98 16 103/59 (74) 98 Nasal Cannula 4 


 


1/26/18 06:07  78  84/54    


 


1/26/18 06:06        


 


1/26/18 06:00  87      


 


1/26/18 05:55 96.7 66 20 98/56 (70)    


 


1/26/18 05:34  77 18 90/50 (63) 98 Room Air 2.00 


 


1/26/18 05:06  71 18 100/57 (71) 100   


 


1/26/18 04:52  70  97/82    


 


1/26/18 04:15   18     


 


1/26/18 04:03  77 18 100/52 (68) 94 Nasal Cannula 2.00 


 


1/26/18 03:51  81 18 90/51 (64) 98 Nasal Cannula 2.00 


 


1/26/18 03:44  83 18 90/51 (64) 100 Nasal Cannula 2.00 


 


1/26/18 03:39  78 16 90/45 97   


 


1/26/18 03:05 97.4 101 16 93/55 100   


 


1/26/18 02:59     99 Nasal Cannula 2.00 


 


1/26/18 02:20  86 18 101/56 (71) 100 Nasal Cannula 2.00 


 


1/25/18 21:20  91 18 106/51 (69) 98 Nasal Cannula 2.00 


 


1/25/18 19:13  86 18 98/50 (66) 96 Room Air  


 


1/25/18 17:47  81  84/57    


 


1/25/18 17:00     94 Nasal Cannula 2.00 


 


1/25/18 17:00     94 Nasal Cannula 2.00 


 


1/25/18 16:57 97.6 60 13 77/49 (58) 94 Nasal Cannula 2.00 








Physical Exam


CONSTITUTIONAL/GENERAL: This is an adequately nourished patient, in no apparent 

distress. Chronically ill apearing


TUBES/LINES/DRAINS:


AV fistula in place LUE with excellent thrill





SKIN: No jaundice, rashes, or lesions. Ecchymoses on upper extremities. No 

wounds seen anteriorly. Skin temperature appropriate. Not diaphoretic. 


HEAD: Atraumatic. Normocephalic.


EYES: Pupils equal and round and reactive. Extraocular motions intact. No 

scleral icterus. No injection or drainage. Fundi not examined.


ENT: Hearing grossly normal. Nose without bleeding or purulent drainage. Throat 

without visible erythema, exudates, masses, or lesions.


NECK: Trachea midline. Supple, nontender. No palpable thyroid enlargement or 

nodularity. 


CARDIOVASCULAR: Regular rate and rhythm without murmurs, gallops, or rubs. No 

JVD. Peripheral pulses symmetric.


RESPIRATORY/CHEST: Symmetric, unlabored respirations. Clear to auscultation. 

Breath sounds equal bilaterally. No wheezes, rales, or rhonchi.  


GASTROINTESTINAL: Abdomen soft, non-tender, nondistended. No hepato-splenomegaly

, or palpable masses. No guarding. Bowel sounds present.


PD cath in place LLQ with minimal erythema and minimal tenderness 


NO peritoneal signs


GENITOURINARY: Without palpable bladder distension.  


MUSCULOSKELETAL: Extremities without clubbing, cyanosis, or edema. No joint 

tenderness or effusion noted. No calf tenderness. No mottling or clubbing.


LYMPHATICS: No palpable cervical or supraclavicular adenopathy.


NEUROLOGICAL: Awake and alert. Motor and sensory grossly within normal limits. 

Follows commands. confused. Moves all extremities.


PSYCHIATRIC: No obvious anxiety/depression. no apparent hallucinations or other 

psychotic thought process.


Laboratory





Laboratory Tests








Test


  1/25/18


17:25 1/26/18


02:37 1/26/18


06:40 1/26/18


09:00


 


White Blood Count 11.4  11.6   9.6 


 


Red Blood Count 1.99  1.90   2.77 


 


Hemoglobin 6.7  6.4   8.9 


 


Hematocrit 20.8  20.0   26.5 


 


Mean Corpuscular Volume 104.8  104.9   95.8 


 


Mean Corpuscular Hemoglobin 33.4  33.4   32.1 


 


Mean Corpuscular Hemoglobin


Concent 31.9 


  31.8 


  


  33.5 


 


 


Red Cell Distribution Width 17.7  17.8   18.6 


 


Platelet Count 189  204   136 


 


Mean Platelet Volume 8.0  7.6   7.7 


 


Neutrophils (%) (Auto) 90.9  92.1   91.3 


 


Lymphocytes (%) (Auto) 3.9  3.9   4.0 


 


Monocytes (%) (Auto) 4.7  3.5   4.3 


 


Eosinophils (%) (Auto) 0.3  0.2   0.3 


 


Basophils (%) (Auto) 0.2  0.3   0.1 


 


Neutrophils # (Auto) 10.4  10.6   8.7 


 


Lymphocytes # (Auto) 0.4  0.5   0.4 


 


Monocytes # (Auto) 0.5  0.4   0.4 


 


Eosinophils # (Auto) 0.0  0.0   0.0 


 


Basophils # (Auto) 0.0  0.0   0.0 


 


CBC Comment DIFF FINAL  AUTO DIFF   DIFF FINAL 


 


Differential Comment


   


  FINAL DIFF


MANUAL 


   


 


 


Prothrombin Time 18.0  16.4   15.2 


 


Prothromb Time International


Ratio 1.8 


  1.6 


  


  1.5 


 


 


Activated Partial


Thromboplast Time 29.4 


  28.3 


  


  


 


 


Blood Urea Nitrogen 57    62 


 


Creatinine 5.39    5.31 


 


Random Glucose 163    157 


 


Total Protein 5.2    4.7 


 


Albumin 1.4    1.4 


 


Calcium Level 7.2    7.1 


 


Alkaline Phosphatase 414    342 


 


Aspartate Amino Transf


(AST/SGOT) 22 


  


  


  20 


 


 


Alanine Aminotransferase


(ALT/SGPT) 17 


  


  


  16 


 


 


Total Bilirubin 0.4    0.7 


 


Sodium Level 139    138 


 


Potassium Level 4.5    5.0 


 


Chloride Level 100    102 


 


Carbon Dioxide Level 30.9    27.7 


 


Anion Gap 8    8 


 


Estimat Glomerular Filtration


Rate 11 


  


  


  11 


 


 


Lactic Acid Level 1.3    


 


Protein Corrected Calcium 8.2    8.4 


 


Total Creatine Kinase 45    


 


Troponin I 0.05    


 


Differential Total Cells


Counted 


  100 


  


  


 


 


Neutrophils % (Manual)  86   


 


Band Neutrophils %  3   


 


Lymphocytes %  6   


 


Monocytes %  4   


 


Neutrophils # (Manual)  10.4   


 


Myelocytes  1   


 


Nucleated Red Blood Cells  1   


 


Platelet Estimate  NORMAL   


 


Platelet Morphology Comment  NORMAL   


 


Polychromasia  5.5   


 


Basophilic Stippling  FAINT   


 


Acanthocytes  OCC   


 


Nasal Screen MRSA (PCR)


  


  


  MRSA NOT


DETECTED 


 














 Date/Time


Source Procedure


Growth Status


 


 


 1/26/18 07:19


Blood Peripheral Aerobic Blood Culture


Pending Received


 


 1/26/18 07:19


Blood Peripheral Anaerobic Blood Culture


Pending Received








Result Diagram:  


1/26/18 0900                                                                   

             1/26/18 0900





Imaging





Last Impressions








Chest X-Ray 1/25/18 1656 Signed





Impressions: 





 Service Date/Time:  Thursday, January 25, 2018 17:25 - CONCLUSION:  1. Right 

IJ 





 central venous catheter with the tip projecting over the central venous 

system. 





 No pneumothorax. 2. Lungs are hypoinflated but clear. 3. Borderline prominent 





 but well compensated heart     Stephen Rome MD 











Assessment and Plan


Assessment and Plan


[facility]  [facility]


case was dw Dr Hoang: he ervin me that clx grw out C.albicans


Sepis suspeced on presentation





   - catheter needs to be removed


   fu new PD fluid cl;x


   cont for now broad spectrum abx


   will switch to flucoanzol


Discussed Condition With


Viktor Juárez,Valerie GROVE MD Jan 26, 2018 14:34

## 2018-01-26 NOTE — HHI.PR
__________________________________________________





Subjective


Subjective Notes


Pt known to me from office visit this week to discuss removal of PD catheter 

that by verbal report had a fungal infection. There apparently is difficulty 

confirming that per Dr Kamara of ID. New fluid sent for culture, fungus 

included. Pt getting HD at the present. Notes from my office evaluation this 

week sent in for the hospital record.





Objective


Vitals/I&O





Vital Signs








  Date Time  Temp Pulse Resp B/P (MAP) Pulse Ox O2 Delivery O2 Flow Rate FiO2


 


1/26/18 16:00  160      


 


1/26/18 15:00 98.9   94/62 (73)    


 


1/26/18 12:30   16  96 Nasal Cannula 3 








Labs





Laboratory Tests








Test


  1/26/18


02:37 1/26/18


06:40 1/26/18


09:00 1/26/18


16:33


 


White Blood Count 11.6   9.6  


 


Red Blood Count 1.90   2.77  


 


Hemoglobin 6.4   8.9  9.6 


 


Hematocrit 20.0   26.5  28.8 


 


Mean Corpuscular Volume 104.9   95.8  


 


Mean Corpuscular Hemoglobin 33.4   32.1  


 


Mean Corpuscular Hemoglobin


Concent 31.8 


  


  33.5 


  


 


 


Red Cell Distribution Width 17.8   18.6  


 


Platelet Count 204   136  


 


Mean Platelet Volume 7.6   7.7  


 


Neutrophils (%) (Auto) 92.1   91.3  


 


Lymphocytes (%) (Auto) 3.9   4.0  


 


Monocytes (%) (Auto) 3.5   4.3  


 


Eosinophils (%) (Auto) 0.2   0.3  


 


Basophils (%) (Auto) 0.3   0.1  


 


Neutrophils # (Auto) 10.6   8.7  


 


Lymphocytes # (Auto) 0.5   0.4  


 


Monocytes # (Auto) 0.4   0.4  


 


Eosinophils # (Auto) 0.0   0.0  


 


Basophils # (Auto) 0.0   0.0  


 


CBC Comment AUTO DIFF   DIFF FINAL  


 


Differential Total Cells


Counted 100 


  


  


  


 


 


Neutrophils % (Manual) 86    


 


Band Neutrophils % 3    


 


Lymphocytes % 6    


 


Monocytes % 4    


 


Neutrophils # (Manual) 10.4    


 


Myelocytes 1    


 


Nucleated Red Blood Cells 1    


 


Differential Comment


  FINAL DIFF


MANUAL 


   


  


 


 


Platelet Estimate NORMAL    


 


Platelet Morphology Comment NORMAL    


 


Polychromasia 5.5    


 


Basophilic Stippling FAINT    


 


Acanthocytes OCC    


 


Prothrombin Time 16.4   15.2  


 


Prothromb Time International


Ratio 1.6 


  


  1.5 


  


 


 


Activated Partial


Thromboplast Time 28.3 


  


  


  


 


 


Nasal Screen MRSA (PCR)


  


  MRSA NOT


DETECTED 


  


 


 


Blood Urea Nitrogen   62  


 


Creatinine   5.31  


 


Random Glucose   157  


 


Total Protein   4.7  


 


Albumin   1.4  


 


Calcium Level   7.1  


 


Alkaline Phosphatase   342  


 


Aspartate Amino Transf


(AST/SGOT) 


  


  20 


  


 


 


Alanine Aminotransferase


(ALT/SGPT) 


  


  16 


  


 


 


Total Bilirubin   0.7  


 


Sodium Level   138  


 


Potassium Level   5.0  


 


Chloride Level   102  


 


Carbon Dioxide Level   27.7  


 


Anion Gap   8  


 


Estimat Glomerular Filtration


Rate 


  


  11 


  


 


 


Protein Corrected Calcium   8.4  














 Date/Time


Source Procedure


Growth Status


 


 


 1/26/18 07:19


Blood Peripheral Aerobic Blood Culture


Pending Received


 


 1/26/18 07:19


Blood Peripheral Anaerobic Blood Culture


Pending Received





 1/26/18 15:20


Fluid Peritoneal Fluid Fungal Smear


Pending Received


 


 1/26/18 15:20


Fluid Peritoneal Fluid Fungal Culture


Pending Received








Narrative Exam


Abdominal exam benign. No peritonitis present. PD catheter exit site 

uncomplicated.





A/P


Assessment and Plan


PD catheter in place, apparent fungal infection requiring PD catheter removal. 


Cultures sent.


Pt recovering from anemia, has had transfusions.


I will se patient tomorrow and follow up on cultures. I will add on for PD 

catheter removal if cultures do demonstrate infection is present. 


I d/w patient tonight. He agrees with the plan.











Donny Juarez MD Jan 26, 2018 17:57

## 2018-01-26 NOTE — PD.CONS
[facility]


Service


Nephrology


Consult Requested By


Dr. Junior


Reason for Consult


ESRD on PD


Primary Care Physician


Carlita Hoang MD


History of Present Illness





Patient is a very pleasant 67 year old male who came into the emergency room on 

01/26/18 hypotensive, lethargic, and with a hemoglobin of 6.7.  He was 

transfused 2 units of PRBC's with a recheck pending.  He was initially put on 

Levophed for blood pressure support however it is currently not running. Has a 

past medical history of End -stage renal disease with peritoneal dialysis, AFIB

, diabetes, arthritis, chronic pain, and hx of strokes.    Onset of acute 

epigastric pain and discomfort noted for 5 days ago, but worsened over the past 

24 hours.  Reported loose stools for 3 weeks.  In the emergency room patient 

did report melena, nausea, mild vomiting undigested food yesterday, but denied 

any coffee ground emesis. Patient has AV fistula and will have dialysis while 

here.  Per patient PD catheter needs to be removed secondary to infection.  EGD 

planned for today. 





 (Gellermann,Diane M. ARNP)





Review of Systems


Constitutional:  COMPLAINS OF: Fatigue


Respiratory:  COMPLAINS OF: Shortness of breath


Cardiovascular:  DENIES: Chest pain, Lower Extremity Edema


Gastrointestinal:  COMPLAINS OF: Black stools, Diarrhea, Nausea, Vomiting


Genitourinary:  DENIES: Dysuria


Musculoskeletal:  COMPLAINS OF: Joint pain (Gellermann,Diane M. ARNP)





Past Family Social History


Allergies:  


Coded Allergies:  


     morphine (Unverified  Allergy, Severe, anaphylactic, 1/25/18)


Past Medical History





End-stage renal disease with peritoneal dialysis


Atrial fibrillation


Diabetes


Renal failure with left AV fistula


Arthritis


Chronic pain


History of strokes


Past Surgical History


Appendectomy


Cataracts removed with lens implants


Right peritoneal double-lumen catheter


AVF


Active Ordered Medications





Current Medications








 Medications


  (Trade)  Dose


 Ordered  Sig/Alysas


 Route  Start Time


 Stop Time Status Last Admin


 


 Vasopressin 40


 units/Dextrose  100 ml @ 6


 mls/hr  V03H51L


 IV  1/25/18 16:56


     


 


 


 Epinephrine HCl 2


 mg/Dextrose  250 ml @ 


 22.5 mls/hr  TITRATE  PRN


 IV  1/25/18 17:00


     


 


 


 Norepinephrine


 Bitartrate  250 ml @ 


 7.5 mls/hr  TITRATE  PRN


 IV  1/25/18 17:15


    1/25/18 17:47


 


 


 Sodium Chloride  250 ml @ 


 15 mls/hr  ONCE  ONCE


 IV  1/26/18 02:15


 1/26/18 18:54  1/26/18 04:30


 


 


  (NS Flush)  2 ml  UNSCH  PRN


 IV FLUSH  1/26/18 03:00


     


 


 


  (NS Flush)  2 ml  BID


 IV FLUSH  1/26/18 09:00


     


 


 


  (Zofran Inj)  4 mg  Q6H  PRN


 IV PUSH  1/26/18 03:00


     


 


 


  (Duoneb Neb)  1 ampule  Q4HR NEB  PRN


 INH  1/26/18 03:00


     


 


 


 Miscellaneous


 Information  1  Q361D


 XX  1/26/18 03:00


     


 


 


  (Chlorhexidine


 2% Cloth)  3 pack


 Taper  DAILY@04


 TOP  1/26/18 04:00


 1/22/19 03:59  1/26/18 04:00


 


 


  (Chlorhexidine


 2% Cloth)  3 pack  UNSCH  PRN


 TOP  1/26/18 03:00


     


 


 


  (Tessie-Colace)  1 tab  BID


 PO  1/26/18 09:00


     


 


 


  (Milk Of


 Magnesia Liq)  30 ml  Q12H  PRN


 PO  1/26/18 03:00


     


 


 


  (Senokot)  17.2 mg  Q12H  PRN


 PO  1/26/18 03:00


     


 


 


  (Dulcolax Supp)  10 mg  DAILY  PRN


 RECTAL  1/26/18 03:00


     


 


 


  (Lactulose Liq)  30 ml  DAILY  PRN


 PO  1/26/18 03:00


     


 


 


 Pantoprazole


 Sodium 80 mg/


 Sodium Chloride  100 ml @ 


 10 mls/hr  Q10H


 IV  1/26/18 03:35


    1/26/18 04:51


 


 


  (D50w (Vial) Inj)  50 ml  UNSCH  PRN


 IV PUSH  1/26/18 08:45


     


 


 


  (Glucagon Inj)  1 mg  UNSCH  PRN


 OTHER  1/26/18 08:45


     


 


 


  (NovoLIN R


 SUPPLEMENTAL


 SCALE)  1  Q4H


 SQ  1/26/18 08:45


     


 








Social History


Denies ETOH use


Denies smoking





 (Gellermann,Diane M. ARNP)





Physical Exam


Vital Signs





Vital Signs








  Date Time  Temp Pulse Resp B/P (MAP) Pulse Ox O2 Delivery O2 Flow Rate FiO2


 


1/26/18 06:07  78  84/54    


 


1/26/18 06:06        


 


1/26/18 06:00  87      


 


1/26/18 05:55 96.7 66 20 98/56 (70)    


 


1/26/18 05:34  77 18 90/50 (63) 98 Room Air 2.00 


 


1/26/18 05:06  71 18 100/57 (71) 100   


 


1/26/18 04:52  70  97/82    


 


1/26/18 04:15   18     


 


1/26/18 04:03  77 18 100/52 (68) 94 Nasal Cannula 2.00 


 


1/26/18 03:51  81 18 90/51 (64) 98 Nasal Cannula 2.00 


 


1/26/18 03:44  83 18 90/51 (64) 100 Nasal Cannula 2.00 


 


1/26/18 03:39  78 16 90/45 97   


 


1/26/18 03:05 97.4 101 16 93/55 100   


 


1/26/18 02:59     99 Nasal Cannula 2.00 


 


1/26/18 02:20  86 18 101/56 (71) 100 Nasal Cannula 2.00 


 


1/25/18 21:20  91 18 106/51 (69) 98 Nasal Cannula 2.00 


 


1/25/18 19:13  86 18 98/50 (66) 96 Room Air  


 


1/25/18 17:47  81  84/57    


 


1/25/18 17:00     94 Nasal Cannula 2.00 


 


1/25/18 17:00     94 Nasal Cannula 2.00 


 


1/25/18 16:57 97.6 60 13 77/49 (58) 94 Nasal Cannula 2.00 








Physical Exam


GENERAL: Pale and delayed


SKIN: cool and dry.


HEAD: Atraumatic. Normocephalic. 


EYES:  No scleral icterus. No injection or drainage. 


ENT: No nasal bleeding or discharge.  Mucous membranes pink and dry..


NECK: Trachea midline.  Supple.


CARDIOVASCULAR:.  Irregularly irregular consistent with A. fib.


RESPIRATORY: No accessory muscle use. Clear to auscultation. Breath sounds 

equal bilaterally.  Decreased respiratory effort.


GASTROINTESTINAL: Abdomen soft, nondistended.  There is a PD-Cath in place.  


MUSCULOSKELETAL: Patient has a healing rt heel ulcer.  Venous stasis changes 

noted to his bilateral lower extremities.


NEUROLOGICAL: Awake and weak and mildly confused. No obvious cranial nerve 

deficits.  Moves both upper extremity is


Laboratory





Laboratory Tests








Test


  1/25/18


17:25 1/26/18


02:37 1/26/18


06:40 1/26/18


09:00


 


White Blood Count 11.4  11.6   


 


Red Blood Count 1.99  1.90   


 


Hemoglobin 6.7  6.4   


 


Hematocrit 20.8  20.0   


 


Mean Corpuscular Volume 104.8  104.9   


 


Mean Corpuscular Hemoglobin 33.4  33.4   


 


Mean Corpuscular Hemoglobin


Concent 31.9 


  31.8 


  


  


 


 


Red Cell Distribution Width 17.7  17.8   


 


Platelet Count 189  204   


 


Mean Platelet Volume 8.0  7.6   


 


Neutrophils (%) (Auto) 90.9  92.1   


 


Lymphocytes (%) (Auto) 3.9  3.9   


 


Monocytes (%) (Auto) 4.7  3.5   


 


Eosinophils (%) (Auto) 0.3  0.2   


 


Basophils (%) (Auto) 0.2  0.3   


 


Neutrophils # (Auto) 10.4  10.6   


 


Lymphocytes # (Auto) 0.4  0.5   


 


Monocytes # (Auto) 0.5  0.4   


 


Eosinophils # (Auto) 0.0  0.0   


 


Basophils # (Auto) 0.0  0.0   


 


CBC Comment DIFF FINAL  AUTO DIFF   


 


Differential Comment


   


  FINAL DIFF


MANUAL 


  


 


 


Prothrombin Time 18.0  16.4   


 


Prothromb Time International


Ratio 1.8 


  1.6 


  


  


 


 


Activated Partial


Thromboplast Time 29.4 


  28.3 


  


  


 


 


Blood Urea Nitrogen 57    


 


Creatinine 5.39    


 


Random Glucose 163    


 


Total Protein 5.2    


 


Albumin 1.4    


 


Calcium Level 7.2    


 


Alkaline Phosphatase 414    


 


Aspartate Amino Transf


(AST/SGOT) 22 


  


  


  


 


 


Alanine Aminotransferase


(ALT/SGPT) 17 


  


  


  


 


 


Total Bilirubin 0.4    


 


Sodium Level 139    


 


Potassium Level 4.5    


 


Chloride Level 100    


 


Carbon Dioxide Level 30.9    


 


Anion Gap 8    


 


Estimat Glomerular Filtration


Rate 11 


  


  


  


 


 


Lactic Acid Level 1.3    


 


Protein Corrected Calcium 8.2    


 


Total Creatine Kinase 45    


 


Troponin I 0.05    


 


Differential Total Cells


Counted 


  100 


  


  


 


 


Neutrophils % (Manual)  86   


 


Band Neutrophils %  3   


 


Lymphocytes %  6   


 


Monocytes %  4   


 


Neutrophils # (Manual)  10.4   


 


Myelocytes  1   


 


Nucleated Red Blood Cells  1   


 


Platelet Estimate  NORMAL   


 


Platelet Morphology Comment  NORMAL   


 


Polychromasia  5.5   


 


Basophilic Stippling  FAINT   


 


Acanthocytes  OCC   














 Date/Time


Source Procedure


Growth Status


 


 


 1/26/18 07:19


Blood Peripheral Aerobic Blood Culture


Pending Received


 


 1/26/18 07:19


Blood Peripheral Anaerobic Blood Culture


Pending Received








 (Gellermann,Diane M. ARNP)


Result Diagram:  


1/26/18 0237                                                                   

             1/25/18 1725





Imaging





Last Impressions








Chest X-Ray 1/25/18 1656 Signed





Impressions: 





 Service Date/Time:  Thursday, January 25, 2018 17:25 - CONCLUSION:  1. Right 

IJ 





 central venous catheter with the tip projecting over the central venous 

system. 





 No pneumothorax. 2. Lungs are hypoinflated but clear. 3. Borderline prominent 





 but well compensated heart     Stephen Rome MD 








 (Gellermann,Diane M. ARNP)





Assessment and Plan


Problem List:  


(1) ESRD (end stage renal disease) on dialysis


ICD Codes:  N18.6 - End stage renal disease; Z99.2 - Dependence on renal 

dialysis


(2) Hypotension


ICD Codes:  I95.9 - Hypotension, unspecified


Status:  Acute


(3) GI bleed


ICD Codes:  K92.2 - Gastrointestinal hemorrhage, unspecified


Status:  Acute


Plan:  








(4) Anemia


ICD Codes:  D64.9 - Anemia, unspecified


Assessment and Plan


ESRD has usually been PD nightly but was told that he catheter needs to come 

out due to a fungal infection.  BC pending. 


Has AVF fistula in left arm and dialyzed on Sat, Tues, and was not able to have 

done on Thursday secondary to condition. 


Potassium WNL


Dr. Hoang to arrange dialysis.  


EGD today,symptomatic anemia with HGB of 6.7 to 6.4 on admission.  Transfused 2 

units of PRBC's.  Initially on levophed. 


GI bleed, pain had been in gastric and epigastric area worse over the past 48 

hours, 4 days total


Protonix gtt.


Monitor hemoglobin and hematocrit and other labs


 (Gellermann,Diane M. ARNP)


Problem List:  


(1) ESRD (end stage renal disease) on dialysis


ICD Codes:  N18.6 - End stage renal disease; Z99.2 - Dependence on renal 

dialysis


(2) Hypotension


ICD Codes:  I95.9 - Hypotension, unspecified


Status:  Acute


(3) GI bleed


ICD Codes:  K92.2 - Gastrointestinal hemorrhage, unspecified


Status:  Acute


Plan:  








(4) Anemia


ICD Codes:  D64.9 - Anemia, unspecified


Assessment and Plan


Patient seen and examined.


Agree with above.


HD arranged, orders written, minimal fluid removal.


Surgery consulted for PD Catheter removal.


 (Carlita Hoang MD)





Problem Qualifiers





(1) Hypotension:  


Qualified Codes:  I95.9 - Hypotension, unspecified


(2) GI bleed:  


Qualified Codes:  K92.2 - Gastrointestinal hemorrhage, unspecified


(3) Anemia:  








Gellermann,Diane M. ARNP Jan 26, 2018 10:01


Carlita Hoang MD Jan 26, 2018 19:48

## 2018-01-26 NOTE — HHI.HP
[facility]


Service


Critical Care Medicine


Primary Care Physician


Carlita Hoang MD


Admission Diagnosis





GI Bleed


Diagnosis:  


Travel History


International Travel<30 Days:  No


Contact w/Intl Traveler <30 Da:  No


Traveled to Known Affected Are:  No


History of Present Illness


History of Present Illness


HPI





67 year-old male with a medical history significant for end-stage renal disease 

on peritoneal dialysis who was noted to be severely hypotensive and lethargic 

at the dialysis center on  and 911 was called and patient was transferred 

to [facility] ER.  On arrival he was noted to be hypotensive with SBP 70s 

and heart rate 100s.  He also reportedly had significant melena and rectal 

bleeding following arrival.  A right IJ central line was placed by ER 

physician.  Patient's wife arrived and wanted to make patient DNR status and 

did not want blood transfusions and wished transferring to hospice however 

subsequently patient woke up and wanted to continue aggressive care including 

blood transfusions.  ER physician contacted me questioning patient's wife's 

ability to make decisions for him due to concern for secondary gain as well as 

possible psychiatric illness.  Patient was accepted for admission by critical 

care medicine service.  4 units PRBCs were ordered to be transfused stat by ER 

physician.  When I arrived to see the patient he was laying in the ER stretcher 

on nasal cannula on 5 mics of Levophed.  He is drowsy but easily arousable.  He 

knew he was at the hospital.


He could not give me details of his history.  He did tell me that Dr. Hoang is 

his nephrologist.





[facility]


Past Medical History


Atrial Fibrillation:  Yes


Cancer:  No


Cardiovascular Problems:  Yes (a fib)


Diabetes:  Yes


Patient Takes Glucophage:  Yes (unknown)


Endocrine:  Yes


Genitourinary:  Yes (kidney failure left AV fistula, peritoneal dialysis 

catheter)


Hepatitis:  No


Hiatal Hernia:  No


Immune Disorder:  No


Medical other:  Yes


Musculoskeletal:  Yes (minor arthritis)


Neurologic:  Yes (hx strokes)


Psychiatric:  No


Reproductive:  No


Respiratory:  No


Renal Failure:  Yes


Thyroid Disease:  No





Past Surgical History


Abdominal Surgery:  Yes (appendectomy)


Appendectomy:  Yes


Body Medical Devices:  right double lumen catheter


Cardiac Surgery:  No


Ear Surgery:  No


Endocrine Surgery:  No


Eye Surgery:  Yes (CATARACTS REMOVED WITH LENS IMPLANTS)


Genitourinary Surgery:  No


Joint Replacement:  Yes (left hip)


Oral Surgery:  No


Pacemaker:  No


Thoracic Surgery:  Yes (double lumen catheter on the right side)





Social History


Alcohol Use:  No


Tobacco Use:  No


Substance Use:  No





Allergies-Medications


(Allergen,Severity, Reaction):  


Coded Allergies:  


     morphine (Unverified  Allergy, Severe, anaphylactic, 18)


Reported Meds & Prescriptions





Reported Meds & Active Scripts


Active


Active Prescriptions or Reported Medications Unobtainable    








Review of Systems


ROS Limitations:  Clinical Condition (Review of systems may not be extremely 

accurate secondary to the patient's clinical situation.)


Except as stated in HPI:  all other systems reviewed are Neg


General / Constitutional:  No: Fever, Chills


HENT:  No: Headaches, Lightheadedness, Neck Stiffness, Neck Pain


Cardiovascular:  No: Chest Pain or Discomfort, Palpitations


Respiratory:  No: Cough, Shortness of Breath


Gastrointestinal:  Positive: Diarrhea, No: Nausea, Vomiting


Genitourinary:  Positive: Other (Patient reports that he makes trace amount of 

urine.)


Musculoskeletal:  Positive: Weakness (Generalized), No: Pain


Neurologic:  Positive: Weakness, No: Headache (Generalized)





Past Family Social History


Allergies:  


Coded Allergies:  


     morphine (Unverified  Allergy, Severe, anaphylactic, 18)





Physical Exam


Vital Signs





Vital Signs








  Date Time  Temp Pulse Resp B/P (MAP) Pulse Ox O2 Delivery O2 Flow Rate FiO2


 


18 02:20  86 18 101/56 (71) 100 Nasal Cannula 2.00 


 


18 21:20  91 18 106/51 (69) 98 Nasal Cannula 2.00 


 


18 19:13  86 18 98/50 (66) 96 Room Air  


 


18 17:47  81  84/57    


 


18 17:00     94 Nasal Cannula 2.00 


 


18 17:00     94 Nasal Cannula 2.00 


 


18 16:57 97.6 60 13 77/49 (58) 94 Nasal Cannula 2.00 








Physical Exam


Physical Exam


Narrative


GENERAL: Pale ill-appearing male laying in ER stretcher in minimal respiratory 

distress on nasal cannula


SKIN: Focused skin assessment cool and dry.


HEAD: Atraumatic. Normocephalic. 


EYES:  No scleral icterus. No injection or drainage. 


ENT: No nasal bleeding or discharge.  Mucous membranes pink and dry..


NECK: Trachea midline.  Supple.


CARDIOVASCULAR: Heart rate in the 80s.  Irregularly irregular consistent with 

A. fib.


RESPIRATORY: No accessory muscle use. Clear to auscultation. Breath sounds 

equal bilaterally.  Decreased respiratory effort.


GASTROINTESTINAL: Abdomen soft, nondistended.  There is a PD-Cath in place.  

There is no drainage noted around it.


MUSCULOSKELETAL: Patient has a healing rt heel ulcer.  Venous stasis changes 

noted to his bilateral lower extremities.


NEUROLOGICAL: Awake and weak and mildly confused. No obvious cranial nerve 

deficits.  Moves both upper extremity is


BACK: Stage I/early to decubitus in his sacral area.  There is a large amount 

of maroon colored stool noted in his rectum area.


Laboratory





Laboratory Tests








Test


  18


17:25


 


White Blood Count 11.4 


 


Red Blood Count 1.99 


 


Hemoglobin 6.7 


 


Hematocrit 20.8 


 


Mean Corpuscular Volume 104.8 


 


Mean Corpuscular Hemoglobin 33.4 


 


Mean Corpuscular Hemoglobin


Concent 31.9 


 


 


Red Cell Distribution Width 17.7 


 


Platelet Count 189 


 


Mean Platelet Volume 8.0 


 


Neutrophils (%) (Auto) 90.9 


 


Lymphocytes (%) (Auto) 3.9 


 


Monocytes (%) (Auto) 4.7 


 


Eosinophils (%) (Auto) 0.3 


 


Basophils (%) (Auto) 0.2 


 


Neutrophils # (Auto) 10.4 


 


Lymphocytes # (Auto) 0.4 


 


Monocytes # (Auto) 0.5 


 


Eosinophils # (Auto) 0.0 


 


Basophils # (Auto) 0.0 


 


CBC Comment DIFF FINAL 


 


Differential Comment  


 


Prothrombin Time 18.0 


 


Prothromb Time International


Ratio 1.8 


 


 


Activated Partial


Thromboplast Time 29.4 


 


 


Blood Urea Nitrogen 57 


 


Creatinine 5.39 


 


Random Glucose 163 


 


Total Protein 5.2 


 


Albumin 1.4 


 


Calcium Level 7.2 


 


Alkaline Phosphatase 414 


 


Aspartate Amino Transf


(AST/SGOT) 22 


 


 


Alanine Aminotransferase


(ALT/SGPT) 17 


 


 


Total Bilirubin 0.4 


 


Sodium Level 139 


 


Potassium Level 4.5 


 


Chloride Level 100 


 


Carbon Dioxide Level 30.9 


 


Anion Gap 8 


 


Estimat Glomerular Filtration


Rate 11 


 


 


Lactic Acid Level 1.3 


 


Protein Corrected Calcium 8.2 


 


Total Creatine Kinase 45 


 


Troponin I 0.05 














 Date/Time


Source Procedure


Growth Status


 


 


 18 17:30


Blood Peripheral Aerobic Blood Culture


Pending Received


 


 18 17:30


Blood Peripheral Anaerobic Blood Culture


Pending Received








Result Diagram:  


18 1725                                                                   

             18 1725





Imaging





Last Impressions








Chest X-Ray 18 1656 Signed





Impressions: 





 Service Date/Time:   17:25 - CONCLUSION:  1. Right 

IJ 





 central venous catheter with the tip projecting over the central venous 

system. 





 No pneumothorax. 2. Lungs are hypoinflated but clear. 3. Borderline prominent 





 but well compensated heart     Scott D. Klioze, MD Caprini VTE Risk Assessment


Caprinrobert VTE Risk Assessment:  Mod/High Risk (score >= 2)


Caprini Risk Assessment Model











 Point Value = 1          Point Value = 2  Point Value = 3  Point Value = 5


 


Age 41-60


Minor surgery


BMI > 25 kg/m2


Swollen legs


Varicose veins


Pregnancy or postpartum


History of unexplained or recurrent


   spontaneous 


Oral contraceptives or hormone


   replacement


Sepsis (< 1 month)


Serious lung disease, including


   pneumonia (< 1 month)


Abnormal pulmonary function


Acute myocardial infarction


Congestive heart failure (< 1 month)


History of inflammatory bowel disease


Medical patient at bed rest Age 61-74


Arthroscopic surgery


Major open surgery (> 45 min)


Laparoscopic surgery (> 45 min)


Malignancy


Confined to bed (> 72 hours)


Immobilizing plaster cast


Central venous access Age >= 75


History of VTE


Family history of VTE


Factor V Leiden


Prothrombin 46401B


Lupus anticoagulant


Anticardiolipin antibodies


Elevated serum homocysteine


Heparin-induced thrombocytopenia


Other congenital or acquired


   thrombophilia Stroke (< 1 month)


Elective arthroplasty


Hip, pelvis, or leg fracture


Acute spinal cord injury (< 1 month)








Prophylaxis Regimen











   Total Risk


Factor Score Risk Level Prophylaxis Regimen


 


0-1      Low Early ambulation


 


2 Moderate Order ONE of the following:


*Sequential Compression Device (SCD)


*Heparin 5000 units SQ BID


 


3-4 Higher Order ONE of the following medications:


*Heparin 5000 units SQ TID


*Enoxaparin/Lovenox 40 mg SQ daily (WT < 150 kg, CrCl > 30 mL/min)


*Enoxaparin/Lovenox 30 mg SQ daily (WT < 150 kg, CrCl > 10-29 mL/min)


*Enoxaparin/Lovenox 30 mg SQ BID (WT < 150 kg, CrCl > 30 mL/min)


AND/OR


*Sequential Compression Device (SCD)


 


5 or more Highest Order ONE of the following medications:


*Heparin 5000 units SQ TID (Preferred with Epidurals)


*Enoxaparin/Lovenox 40 mg SQ daily (WT < 150 kg, CrCl > 30 mL/min)


*Enoxaparin/Lovenox 30 mg SQ daily (WT < 150 kg, CrCl > 10-29 mL/min)


*Enoxaparin/Lovenox 30 mg SQ BID (WT < 150 kg, CrCl > 30 mL/min)


AND


*Sequential Compression Device (SCD)











Assessment and Plan


Assessment and Plan


67 year-old male with:


GI bleed


Hypotension


Encephalopathy


ESRD on peritoneal dialysis


Diabetes mellitus


Atrial fibrillation


Sacral decubitus ulcer


Right heel ulcer





Plan:


Neuro: Avoid sedatives and narcotics, follow neuro status.


Cardiovascular: 4 units PRBCs to be transfused stat.  Continue Levophed for 

pressor support.


Pulmonary: Supplemental O2 as needed.  Bronchodilators when necessary.


GI/liver: Nothing by mouth, we'll initiate Protonix 80 mg IV bolus followed by 

8 mg/h IV infusion.  GI consult for further evaluation of GI bleed.  EGD/

colonoscopy to be decided by GI.


Renal/: Strict intake output, monitor and replete electro lites, follow BUN/

creatinine.  We'll consult nephrology Dr. Hoang for peritoneal dialysis.


Heme: Follow serial H&H and coags.  Vitamin K 10 mg IV stat.  Calcium chloride 

1 g IV piggyback.


Endocrine: SSI for glycemic control


ID: Will obtain blood cultures.  Empiric antibiotic coverage with IV Levaquin.


Prophylaxis: PPI/SCDs.


CODE STATUS: Full CODE STATUS at this time.  We'll consult palliative care to 

assist with deciding goals of therapy.





Condition critical


Time spent on critical care excluding procedures 60 minutes











Luke Junior MD 2018 02:46

## 2018-01-26 NOTE — HHI.PR
Addendum to Inpatient Note


Additional Information


Pt seen around 1500 today


full note to follow











Valerie Kamara MD Jan 26, 2018 17:11

## 2018-01-26 NOTE — PD.CONS
HPI


History of Present Illness


This is a 67 year old male who came into the emergency room on 01/26/18 

hypotensive, lethargic, and with a hemoglobin of 6.7/hematocrit 20.8.  An IJ 

central line was placed in the emergency room, Levophed was started.  Patient 

was very drowsy initially on admission but did awaken son and was able to talk 

to staff.  He requested aggressive care at that time and still is willing to 

continue treatment regimen if he can return back to his baseline.  Onset of 

acute epigastric pain and discomfort noted for 5 days ago, but worsened over 

the past 24 hours.  In the emergency room patient did report melena, nausea, 

mild vomiting undigested food yesterday, but denied any coffee ground emesis.  

Patient denies abdominal pain diarrhea or constipation.  Current labs include 

WBC count 11.6, alkaline phosphatase 414.  Blood pressure with mild fluctuation 

currently 98 systolic.  Patient is currently off Levophed.  2 units packed RBCs 

have been transfused.


 (Anjali Peck)





PFSH


Past Medical History


End-stage renal disease with peritoneal dialysis


Atrial fibrillation


Diabetes


Renal failure with left AV fistula


Arthritis


Chronic pain


History of strokes


Past Surgical History


Appendectomy


Cataracts removed with lens implants


Right peritoneal double-lumen catheter


This information is per the record


 (Anjali Peck)


Coded Allergies:  


     morphine (Unverified  Allergy, Severe, anaphylactic, 1/25/18)


Medications





Administered Medications








 Medications


  (Trade)  Dose


 Ordered  Sig/Alyssa


 Route


 PRN Reason  Start Time


 Stop Time Status Last Admin


Dose Admin


 


 Norepinephrine


 Bitartrate  250 ml @ 


 7.5 mls/hr  TITRATE  PRN


 IV


 Maintain MAP > 65 mmHg  1/25/18 17:15


    1/25/18 17:47


 


 


 Sodium Chloride  250 ml @ 


 15 mls/hr  ONCE  ONCE


 IV


   1/26/18 02:15


 1/26/18 18:54  1/26/18 04:30


 


 


 Chlorhexidine


 Gluconate


  (Chlorhexidine


 2% Cloth)  3 pack


 Taper  DAILY@04


 TOP


   1/26/18 04:00


 1/22/19 03:59  1/26/18 04:00


 


 


 Pantoprazole


 Sodium 80 mg/


 Sodium Chloride  100 ml @ 


 10 mls/hr  Q10H


 IV


   1/26/18 03:35


    1/26/18 04:51


 








Social History


Patient does have wife


No current tobacco or alcohol


 (Anjali Peck)





Review of Systems


Constitutional:  COMPLAINS OF: Fatigue


Gastrointestinal:  COMPLAINS OF: Nausea


Musculoskeletal:  COMPLAINS OF: Joint pain (chronic pain and acute) (Anjali Peck)





GI Exam


Vitals I&O





Vital Signs








  Date Time  Temp Pulse Resp B/P (MAP) Pulse Ox O2 Delivery O2 Flow Rate FiO2


 


1/26/18 06:07  78  84/54    


 


1/26/18 06:06        


 


1/26/18 06:00  87      


 


1/26/18 05:55 96.7 66 20 98/56 (70)    


 


1/26/18 05:34  77 18 90/50 (63) 98 Room Air 2.00 


 


1/26/18 05:06  71 18 100/57 (71) 100   


 


1/26/18 04:52  70  97/82    


 


1/26/18 04:15   18     


 


1/26/18 04:03  77 18 100/52 (68) 94 Nasal Cannula 2.00 


 


1/26/18 03:51  81 18 90/51 (64) 98 Nasal Cannula 2.00 


 


1/26/18 03:44  83 18 90/51 (64) 100 Nasal Cannula 2.00 


 


1/26/18 03:39  78 16 90/45 97   


 


1/26/18 03:05 97.4 101 16 93/55 100   


 


1/26/18 02:59     99 Nasal Cannula 2.00 


 


1/26/18 02:20  86 18 101/56 (71) 100 Nasal Cannula 2.00 


 


1/25/18 21:20  91 18 106/51 (69) 98 Nasal Cannula 2.00 


 


1/25/18 19:13  86 18 98/50 (66) 96 Room Air  


 


1/25/18 17:47  81  84/57    


 


1/25/18 17:00     94 Nasal Cannula 2.00 


 


1/25/18 17:00     94 Nasal Cannula 2.00 


 


1/25/18 16:57 97.6 60 13 77/49 (58) 94 Nasal Cannula 2.00 














I/O      


 


 1/25/18 1/25/18 1/25/18 1/26/18 1/26/18 1/26/18





 07:00 15:00 23:00 07:00 15:00 23:00


 


Intake Total    1137 ml  


 


Balance    1137 ml  


 


      


 


Intake IV Total    337 ml  


 


Packed Cells    800 ml  


 


# Voids    0  


 


# Bowel Movements   1   








Imaging





Last Impressions








Chest X-Ray 1/25/18 1216 Signed





Impressions: 





 Service Date/Time:  Thursday, January 25, 2018 17:25 - CONCLUSION:  1. Right 

IJ 





 central venous catheter with the tip projecting over the central venous 

system. 





 No pneumothorax. 2. Lungs are hypoinflated but clear. 3. Borderline prominent 





 but well compensated heart     Stephen Rome MD 








Laboratory











Test


  1/25/18


17:25 1/26/18


02:37 1/26/18


06:40


 


White Blood Count 11.4 TH/MM3  11.6 TH/MM3  


 


Red Blood Count 1.99 MIL/MM3  1.90 MIL/MM3  


 


Hemoglobin 6.7 GM/DL  6.4 GM/DL  


 


Hematocrit 20.8 %  20.0 %  


 


Mean Corpuscular Volume 104.8 FL  104.9 FL  


 


Mean Corpuscular Hemoglobin 33.4 PG  33.4 PG  


 


Mean Corpuscular Hemoglobin


Concent 31.9 % 


  31.8 % 


  


 


 


Red Cell Distribution Width 17.7 %  17.8 %  


 


Platelet Count 189 TH/MM3  204 TH/MM3  


 


Mean Platelet Volume 8.0 FL  7.6 FL  


 


Neutrophils (%) (Auto) 90.9 %  92.1 %  


 


Lymphocytes (%) (Auto) 3.9 %  3.9 %  


 


Monocytes (%) (Auto) 4.7 %  3.5 %  


 


Eosinophils (%) (Auto) 0.3 %  0.2 %  


 


Basophils (%) (Auto) 0.2 %  0.3 %  


 


Neutrophils # (Auto) 10.4 TH/MM3  10.6 TH/MM3  


 


Lymphocytes # (Auto) 0.4 TH/MM3  0.5 TH/MM3  


 


Monocytes # (Auto) 0.5 TH/MM3  0.4 TH/MM3  


 


Eosinophils # (Auto) 0.0 TH/MM3  0.0 TH/MM3  


 


Basophils # (Auto) 0.0 TH/MM3  0.0 TH/MM3  


 


CBC Comment DIFF FINAL  AUTO DIFF  


 


Differential Comment


   


  FINAL DIFF


MANUAL 


 


 


Prothrombin Time 18.0 SEC  16.4 SEC  


 


Prothromb Time International


Ratio 1.8 RATIO 


  1.6 RATIO 


  


 


 


Activated Partial


Thromboplast Time 29.4 SEC 


  28.3 SEC 


  


 


 


Blood Urea Nitrogen 57 MG/DL   


 


Creatinine 5.39 MG/DL   


 


Random Glucose 163 MG/DL   


 


Total Protein 5.2 GM/DL   


 


Albumin 1.4 GM/DL   


 


Calcium Level 7.2 MG/DL   


 


Alkaline Phosphatase 414 U/L   


 


Aspartate Amino Transf


(AST/SGOT) 22 U/L 


  


  


 


 


Alanine Aminotransferase


(ALT/SGPT) 17 U/L 


  


  


 


 


Total Bilirubin 0.4 MG/DL   


 


Sodium Level 139 MEQ/L   


 


Potassium Level 4.5 MEQ/L   


 


Chloride Level 100 MEQ/L   


 


Carbon Dioxide Level 30.9 MEQ/L   


 


Anion Gap 8 MEQ/L   


 


Estimat Glomerular Filtration


Rate 11 ML/MIN 


  


  


 


 


Lactic Acid Level 1.3 mmol/L   


 


Protein Corrected Calcium 8.2 MG/DL   


 


Total Creatine Kinase 45 U/L   


 


Troponin I 0.05 NG/ML   


 


Differential Total Cells


Counted 


  100 


  


 


 


Neutrophils % (Manual)  86 %  


 


Band Neutrophils %  3 %  


 


Lymphocytes %  6 %  


 


Monocytes %  4 %  


 


Neutrophils # (Manual)  10.4 TH/MM3  


 


Myelocytes  1 %  


 


Nucleated Red Blood Cells  1 /100 WBC  


 


Platelet Estimate  NORMAL  


 


Platelet Morphology Comment  NORMAL  


 


Polychromasia  5.5 %  


 


Basophilic Stippling  FAINT  


 


Acanthocytes  OCC  














 Date/Time


Source Procedure


Growth Status


 


 


 1/26/18 07:19


Blood Peripheral Aerobic Blood Culture


Pending Received


 


 1/26/18 07:19


Blood Peripheral Anaerobic Blood Culture


Pending Received








Physical Examination


HEENT: Pupils round and reactive to light 1-2 mm bilateral; normocephalic; 

atraumatic; no jaundice, very pale,  Throat is hoarse


NECK: Neck is supple, no JVD, no lymphadenopathy.


CHEST:  Chest managed breath sounds


CARDIAC:  Irregular rhythm under 100


ABDOMEN:  Flat, Soft, nondistended, nontender; no hepatosplenomegaly; bowel 

sounds soft


EXTREMITIES: No clubbing, cyanosis, or edema.


SKIN:  Thin, dry, pale, no rash; no jaundice.


CNS: ; alert and oriented times 3, drowsy but arouses to verbal stimuli


 (Anjali Peck)





Assessment and Plan


Assessment:  


(1) Anemia


ICD Codes:  D64.9 - Anemia, unspecified


(2) GI bleed


ICD Codes:  K92.2 - Gastrointestinal hemorrhage, unspecified


Status:  Acute


(3) History of renal failure


ICD Codes:  Z87.448 - Personal history of other diseases of urinary system


Status:  Acute


Plan


Anemia, symptomatic on admission to emergency room, so far received 2 units 

packed RBCs, hemoglobin is pending


GI bleed, pain had been in gastric and epigastric area worse over the past 48 

hours, 4 days total


Patient has been hypotensive and is being monitored in the intensive care unit, 

but currently Levophed is off





Plan


EGD today, but need to evaluate next hemoglobin


Consents,


OR has been called to put on scheduled


Monitor for any acute bleeding and call GI


IV PPI drip


Monitor hemoglobin and hematocrit and other labs


Further recommendations will be based on symptom management and plan of care, 

currently patient is requesting full code full aggressive care per my 

discussion with him.





This note has been written on the behalf of Dr. Jang, seen on his behalf


 (Anjali Peck)


Plan


Patient was seen and examined, agree with above-noted, we'll plan on doing 

upper endoscopy but may need colonoscopy eventually the patient is has multiple 

medical problems so he is high risk for the procedure and he is aware that


 (Dajuan Diaz MD)





Problem Qualifiers





(1) GI bleed:  


Qualified Codes:  K92.2 - Gastrointestinal hemorrhage, unspecified








Anjali Peck Jan 26, 2018 09:16


Dajuan Diaz MD Jan 26, 2018 11:50

## 2018-01-27 VITALS
SYSTOLIC BLOOD PRESSURE: 91 MMHG | RESPIRATION RATE: 15 BRPM | HEART RATE: 71 BPM | OXYGEN SATURATION: 93 % | DIASTOLIC BLOOD PRESSURE: 44 MMHG

## 2018-01-27 VITALS
OXYGEN SATURATION: 100 % | DIASTOLIC BLOOD PRESSURE: 58 MMHG | TEMPERATURE: 97.6 F | HEART RATE: 98 BPM | RESPIRATION RATE: 14 BRPM | SYSTOLIC BLOOD PRESSURE: 99 MMHG

## 2018-01-27 VITALS
RESPIRATION RATE: 25 BRPM | SYSTOLIC BLOOD PRESSURE: 107 MMHG | HEART RATE: 100 BPM | DIASTOLIC BLOOD PRESSURE: 67 MMHG | OXYGEN SATURATION: 90 %

## 2018-01-27 VITALS
OXYGEN SATURATION: 99 % | HEART RATE: 84 BPM | SYSTOLIC BLOOD PRESSURE: 89 MMHG | TEMPERATURE: 97.8 F | DIASTOLIC BLOOD PRESSURE: 55 MMHG | RESPIRATION RATE: 28 BRPM

## 2018-01-27 VITALS
HEART RATE: 80 BPM | RESPIRATION RATE: 9 BRPM | OXYGEN SATURATION: 100 % | SYSTOLIC BLOOD PRESSURE: 92 MMHG | DIASTOLIC BLOOD PRESSURE: 51 MMHG

## 2018-01-27 VITALS
RESPIRATION RATE: 25 BRPM | SYSTOLIC BLOOD PRESSURE: 102 MMHG | HEART RATE: 104 BPM | OXYGEN SATURATION: 95 % | DIASTOLIC BLOOD PRESSURE: 54 MMHG

## 2018-01-27 VITALS
OXYGEN SATURATION: 95 % | DIASTOLIC BLOOD PRESSURE: 54 MMHG | HEART RATE: 89 BPM | RESPIRATION RATE: 25 BRPM | SYSTOLIC BLOOD PRESSURE: 80 MMHG

## 2018-01-27 VITALS
OXYGEN SATURATION: 93 % | HEART RATE: 95 BPM | SYSTOLIC BLOOD PRESSURE: 86 MMHG | DIASTOLIC BLOOD PRESSURE: 51 MMHG | RESPIRATION RATE: 13 BRPM

## 2018-01-27 VITALS
DIASTOLIC BLOOD PRESSURE: 62 MMHG | OXYGEN SATURATION: 100 % | RESPIRATION RATE: 13 BRPM | HEART RATE: 86 BPM | SYSTOLIC BLOOD PRESSURE: 92 MMHG

## 2018-01-27 VITALS
HEART RATE: 124 BPM | DIASTOLIC BLOOD PRESSURE: 51 MMHG | SYSTOLIC BLOOD PRESSURE: 102 MMHG | RESPIRATION RATE: 23 BRPM | OXYGEN SATURATION: 80 %

## 2018-01-27 VITALS
RESPIRATION RATE: 21 BRPM | OXYGEN SATURATION: 96 % | SYSTOLIC BLOOD PRESSURE: 124 MMHG | DIASTOLIC BLOOD PRESSURE: 67 MMHG | HEART RATE: 83 BPM

## 2018-01-27 VITALS
DIASTOLIC BLOOD PRESSURE: 54 MMHG | TEMPERATURE: 97.5 F | OXYGEN SATURATION: 96 % | RESPIRATION RATE: 17 BRPM | HEART RATE: 79 BPM | SYSTOLIC BLOOD PRESSURE: 88 MMHG

## 2018-01-27 VITALS
DIASTOLIC BLOOD PRESSURE: 52 MMHG | TEMPERATURE: 97.9 F | HEART RATE: 95 BPM | RESPIRATION RATE: 16 BRPM | SYSTOLIC BLOOD PRESSURE: 86 MMHG

## 2018-01-27 VITALS — HEART RATE: 88 BPM

## 2018-01-27 VITALS
DIASTOLIC BLOOD PRESSURE: 50 MMHG | RESPIRATION RATE: 19 BRPM | SYSTOLIC BLOOD PRESSURE: 82 MMHG | HEART RATE: 84 BPM | OXYGEN SATURATION: 95 %

## 2018-01-27 VITALS — HEART RATE: 114 BPM

## 2018-01-27 VITALS
SYSTOLIC BLOOD PRESSURE: 85 MMHG | OXYGEN SATURATION: 97 % | RESPIRATION RATE: 12 BRPM | HEART RATE: 81 BPM | DIASTOLIC BLOOD PRESSURE: 52 MMHG | TEMPERATURE: 97.6 F

## 2018-01-27 VITALS — HEART RATE: 84 BPM

## 2018-01-27 VITALS — OXYGEN SATURATION: 94 %

## 2018-01-27 VITALS — HEART RATE: 80 BPM

## 2018-01-27 VITALS — OXYGEN SATURATION: 100 %

## 2018-01-27 LAB
ALBUMIN SERPL-MCNC: 2 GM/DL (ref 3.4–5)
ALP SERPL-CCNC: 237 U/L (ref 45–117)
ALT SERPL-CCNC: 12 U/L (ref 12–78)
AST SERPL-CCNC: 13 U/L (ref 15–37)
BASOPHILS # BLD AUTO: 0 TH/MM3 (ref 0–0.2)
BASOPHILS NFR BLD: 0.1 % (ref 0–2)
BILIRUB SERPL-MCNC: 0.7 MG/DL (ref 0.2–1)
BUN SERPL-MCNC: 39 MG/DL (ref 7–18)
CALCIUM SERPL-MCNC: 7.7 MG/DL (ref 8.5–10.1)
CHLORIDE SERPL-SCNC: 103 MEQ/L (ref 98–107)
CREAT SERPL-MCNC: 3.88 MG/DL (ref 0.6–1.3)
EOSINOPHIL # BLD: 0 TH/MM3 (ref 0–0.4)
EOSINOPHIL NFR BLD: 0.1 % (ref 0–4)
ERYTHROCYTE [DISTWIDTH] IN BLOOD BY AUTOMATED COUNT: 19 % (ref 11.6–17.2)
GFR SERPLBLD BASED ON 1.73 SQ M-ARVRAT: 16 ML/MIN (ref 89–?)
GLUCOSE SERPL-MCNC: 148 MG/DL (ref 74–106)
HCO3 BLD-SCNC: 29.5 MEQ/L (ref 21–32)
HCT VFR BLD CALC: 23.7 % (ref 39–51)
HCT VFR BLD CALC: 23.8 % (ref 39–51)
HGB BLD-MCNC: 8 GM/DL (ref 13–17)
HGB BLD-MCNC: 8 GM/DL (ref 13–17)
LYMPHOCYTES # BLD AUTO: 0.3 TH/MM3 (ref 1–4.8)
LYMPHOCYTES NFR BLD AUTO: 2.5 % (ref 9–44)
MAGNESIUM SERPL-MCNC: 1.9 MG/DL (ref 1.5–2.5)
MCH RBC QN AUTO: 32.5 PG (ref 27–34)
MCHC RBC AUTO-ENTMCNC: 33.6 % (ref 32–36)
MCV RBC AUTO: 96.9 FL (ref 80–100)
MONOCYTE #: 0.2 TH/MM3 (ref 0–0.9)
MONOCYTES NFR BLD: 2.1 % (ref 0–8)
NEUTROPHILS # BLD AUTO: 11.2 TH/MM3 (ref 1.8–7.7)
NEUTROPHILS NFR BLD AUTO: 95.2 % (ref 16–70)
PHOSPHATE SERPL-MCNC: 2.5 MG/DL (ref 2.5–4.9)
PLATELET # BLD: 108 TH/MM3 (ref 150–450)
PMV BLD AUTO: 7.7 FL (ref 7–11)
PROT SERPL-MCNC: 4.6 GM/DL (ref 6.4–8.2)
RBC # BLD AUTO: 2.45 MIL/MM3 (ref 4.5–5.9)
SODIUM SERPL-SCNC: 140 MEQ/L (ref 136–145)
WBC # BLD AUTO: 11.8 TH/MM3 (ref 4–11)

## 2018-01-27 RX ADMIN — HUMAN INSULIN SCH: 100 INJECTION, SOLUTION SUBCUTANEOUS at 20:45

## 2018-01-27 RX ADMIN — TAZOBACTAM SODIUM AND PIPERACILLIN SODIUM SCH MLS/HR: 250; 2 INJECTION, SOLUTION INTRAVENOUS at 01:40

## 2018-01-27 RX ADMIN — DEXTROSE MONOHYDRATE SCH MLS/HR: 5 INJECTION, SOLUTION INTRAVENOUS at 17:35

## 2018-01-27 RX ADMIN — HUMAN INSULIN SCH: 100 INJECTION, SOLUTION SUBCUTANEOUS at 04:25

## 2018-01-27 RX ADMIN — HUMAN INSULIN SCH: 100 INJECTION, SOLUTION SUBCUTANEOUS at 01:00

## 2018-01-27 RX ADMIN — CHLORHEXIDINE GLUCONATE SCH PACK: 500 CLOTH TOPICAL at 02:09

## 2018-01-27 RX ADMIN — PANTOPRAZOLE SCH MG: 40 TABLET, DELAYED RELEASE ORAL at 09:00

## 2018-01-27 RX ADMIN — PANTOPRAZOLE SODIUM SCH MLS/HR: 40 INJECTION, POWDER, FOR SOLUTION INTRAVENOUS at 17:35

## 2018-01-27 RX ADMIN — TAZOBACTAM SODIUM AND PIPERACILLIN SODIUM SCH MLS/HR: 250; 2 INJECTION, SOLUTION INTRAVENOUS at 06:16

## 2018-01-27 RX ADMIN — HUMAN INSULIN SCH: 100 INJECTION, SOLUTION SUBCUTANEOUS at 16:45

## 2018-01-27 RX ADMIN — HUMAN INSULIN SCH: 100 INJECTION, SOLUTION SUBCUTANEOUS at 08:45

## 2018-01-27 RX ADMIN — FLUCONAZOLE SCH MLS/HR: 2 INJECTION, SOLUTION INTRAVENOUS at 17:35

## 2018-01-27 RX ADMIN — Medication SCH ML: at 09:55

## 2018-01-27 RX ADMIN — PANTOPRAZOLE SODIUM SCH MLS/HR: 40 INJECTION, POWDER, FOR SOLUTION INTRAVENOUS at 09:35

## 2018-01-27 RX ADMIN — HUMAN INSULIN SCH: 100 INJECTION, SOLUTION SUBCUTANEOUS at 12:45

## 2018-01-27 RX ADMIN — DEXTROSE MONOHYDRATE SCH MLS/HR: 5 INJECTION, SOLUTION INTRAVENOUS at 02:16

## 2018-01-27 RX ADMIN — Medication SCH ML: at 21:00

## 2018-01-27 RX ADMIN — STANDARDIZED SENNA CONCENTRATE AND DOCUSATE SODIUM SCH TAB: 8.6; 5 TABLET, FILM COATED ORAL at 21:00

## 2018-01-27 RX ADMIN — SODIUM CHLORIDE SCH MLS/HR: 900 INJECTION INTRAVENOUS at 13:02

## 2018-01-27 RX ADMIN — STANDARDIZED SENNA CONCENTRATE AND DOCUSATE SODIUM SCH TAB: 8.6; 5 TABLET, FILM COATED ORAL at 09:00

## 2018-01-27 RX ADMIN — TAZOBACTAM SODIUM AND PIPERACILLIN SODIUM SCH MLS/HR: 250; 2 INJECTION, SOLUTION INTRAVENOUS at 13:03

## 2018-01-27 NOTE — RADRPT
EXAM DATE/TIME:  01/27/2018 03:15 

 

[facility] COMPARISON:     

CHEST SINGLE AP, January 25, 2018, 17:25.

 

                     

INDICATIONS :     

Shortness of breath, possible pulmonary disease.

                     

 

MEDICAL HISTORY :     

Stroke.  Renal disease, end stage.  Renal failure, chronic.   A-Fib   

 

SURGICAL HISTORY :     

Appendectomy.   

 

ENCOUNTER:     

Subsequent                                        

 

ACUITY:     

3 days      

 

PAIN SCORE:     

Non-responsive.

 

LOCATION:     

Bilateral chest 

 

FINDINGS:     

The heart size is upper limits of normal. There is a right internal jugular central line in place wit
h the tip overlying the right atrium. There is patchy density at the left lower lung. The right lung 
is grossly clear.

 

CONCLUSION:     

Patchy consolidation or atelectasis of the left lower lung.

 

 

 

 Chucky Morton MD on January 27, 2018 at 4:21           

Board Certified Radiologist.

 This report was verified electronically.

## 2018-01-27 NOTE — HHI.NPPN
Subjective


Interval History


On Levophed drip. Received 2 units of PRBC. Patient seen by surgery. Reportedly 

has fungal peritonitis. Had HD yesterday.





Objective Data


Data





Vital Signs








  Date Time  Temp Pulse Resp B/P (MAP) Pulse Ox O2 Delivery O2 Flow Rate FiO2


 


1/27/18 06:00  95      


 


1/27/18 04:00  84      


 


1/27/18 03:00 97.6 81 12 85/52 (63) 97   


 


1/27/18 02:00  88      


 


1/27/18 00:00  114      


 


1/26/18 23:00 97.5 92 11 87/53 (64) 97   


 


1/26/18 23:00  92      


 


1/26/18 22:00  91      


 


1/26/18 21:17     99 Nasal Cannula 2.00 


 


1/26/18 20:00 97.5 92 11 85/50 (62) 99   


 


1/26/18 20:00  92      


 


1/26/18 18:00  104      


 


1/26/18 16:00  160      


 


1/26/18 15:00  98      


 


1/26/18 15:00 98.9 99  94/62 (73)    


 


1/26/18 14:00  101      


 


1/26/18 12:30 97.6 96 16 98/55 (69) 96 Nasal Cannula 3 


 


1/26/18 12:15  99 16 97/52 (67) 99 Nasal Cannula 4 


 


1/26/18 12:05 97.6 98 16 103/59 (74) 98 Nasal Cannula 4 


 


1/26/18 12:00  85      


 


1/26/18 11:00 98.7 85  94/62 (73)    


 


1/26/18 10:00  84      








-:  


1/27/18 0358                                                                   

             1/27/18 0358








 Microbiology


1/26/18 Fungal Smear, Received


          Pending


1/26/18 Fungal Culture, Received


          Pending


1/26/18 Acid Fast Stain, Received


          Pending


1/26/18 Mycobacterial Culture, Received


          Pending


1/26/18 Gram Stain, Received


          Pending


1/26/18 Body Fluid Culture, Received


          Pending





Physical Exam


Neck


Neck Exam:  Neck Supple





Pulmonary


Resp Exam:  Clear Bilaterally





Cardiology


CV Exam:  Regular





Gastrointestinal/Abdomen


GI Exam:  Soft





Extremeties


Extremities Exam:  No Edema





Assessment/Plan


Problem List:  


(1) ESRD (end stage renal disease) on dialysis


ICD Codes:  N18.6 - End stage renal disease; Z99.2 - Dependence on renal 

dialysis


Plan:  Continue HD support. Was dialyzed on 1/26.


Monitor fluid and electrolytes. 





(2) Hypotension


ICD Codes:  I95.9 - Hypotension, unspecified


Status:  Acute


Plan:  On Levophed. 





(3) GI bleed


ICD Codes:  K92.2 - Gastrointestinal hemorrhage, unspecified


Status:  Acute


Plan:  s/p EGD: duodenal ulcer, gastritis, esophagitis. 


Received blood transfusion. Hemoglobin is 8.


On Protonix drip. 








(4) Anemia


ICD Codes:  D64.9 - Anemia, unspecified





Problem Qualifiers





(1) Hypotension:  


Qualified Codes:  I95.9 - Hypotension, unspecified


(2) GI bleed:  


Qualified Codes:  K92.2 - Gastrointestinal hemorrhage, unspecified


(3) Anemia:  








Luiz Leos MD Jan 27, 2018 08:24

## 2018-01-27 NOTE — HHI.CCPN
Subjective


Remarks/Hospital Course


67 year-old male with a medical history significant for end-stage renal disease 

on peritoneal dialysis who was noted to be severely hypotensive and lethargic 

at the dialysis center on 1/25 and 911 was called and patient was transferred 

to [facility] ER.  On arrival he was noted to be hypotensive with SBP 70s 

and heart rate 100s.  He also reportedly had significant melena and rectal 

bleeding following arrival.  A right IJ central line was placed by ER 

physician.  Patient's wife arrived and wanted to make patient DNR status and 

did not want blood transfusions and wished transferring to hospice however 

subsequently patient woke up and wanted to continue aggressive care including 

blood transfusions.  ER physician contacted me questioning patient's wife's 

ability to make decisions for him due to concern for secondary gain as well as 

possible psychiatric illness.  Patient was accepted for admission by critical 

care medicine service.  4 units PRBCs were ordered to be transfused stat by ER 

physician.  When I arrived to see the patient he was laying in the ER stretcher 

on nasal cannula on 5 mics of Levophed.  He is drowsy but easily arousable.  He 

knew he was at the hospital.


He could not give me details of his history.  He did tell me that Dr. Hoang is 

his nephrologist.





1/27 Patient is on Levophed 2 mics, s/p HD yesterday. Awake and alert on 

Protonix drip. s/p EGD yesterday showed 2 large Duodenal ulcer, esophagitis and 

gastritis.





Objective





Vital Signs








  Date Time  Temp Pulse Resp B/P (MAP) Pulse Ox O2 Delivery O2 Flow Rate FiO2


 


1/27/18 06:00  95      


 


1/27/18 03:00 97.6  12 85/52 (63) 97   


 


1/26/18 21:17      Nasal Cannula 2.00 








Result Diagram:  


1/27/18 0358                                                                   

             1/27/18 0358





Other Results





Laboratory Tests








Test


  1/26/18


09:00 1/26/18


16:33 1/27/18


00:39 1/27/18


03:58


 


White Blood Count 9.6 TH/MM3    11.8 TH/MM3 


 


Red Blood Count 2.77 MIL/MM3    2.45 MIL/MM3 


 


Hemoglobin 8.9 GM/DL  9.6 GM/DL  8.0 GM/DL  8.0 GM/DL 


 


Hematocrit 26.5 %  28.8 %  23.8 %  23.7 % 


 


Mean Corpuscular Volume 95.8 FL    96.9 FL 


 


Mean Corpuscular Hemoglobin 32.1 PG    32.5 PG 


 


Mean Corpuscular Hemoglobin


Concent 33.5 % 


  


  


  33.6 % 


 


 


Red Cell Distribution Width 18.6 %    19.0 % 


 


Platelet Count 136 TH/MM3    108 TH/MM3 


 


Mean Platelet Volume 7.7 FL    7.7 FL 


 


Neutrophils (%) (Auto) 91.3 %    95.2 % 


 


Lymphocytes (%) (Auto) 4.0 %    2.5 % 


 


Monocytes (%) (Auto) 4.3 %    2.1 % 


 


Eosinophils (%) (Auto) 0.3 %    0.1 % 


 


Basophils (%) (Auto) 0.1 %    0.1 % 


 


Neutrophils # (Auto) 8.7 TH/MM3    11.2 TH/MM3 


 


Lymphocytes # (Auto) 0.4 TH/MM3    0.3 TH/MM3 


 


Monocytes # (Auto) 0.4 TH/MM3    0.2 TH/MM3 


 


Eosinophils # (Auto) 0.0 TH/MM3    0.0 TH/MM3 


 


Basophils # (Auto) 0.0 TH/MM3    0.0 TH/MM3 


 


CBC Comment DIFF FINAL    DIFF FINAL 


 


Differential Comment      


 


Prothrombin Time 15.2 SEC    


 


Prothromb Time International


Ratio 1.5 RATIO 


  


  


  


 


 


Blood Urea Nitrogen 62 MG/DL    39 MG/DL 


 


Creatinine 5.31 MG/DL    3.88 MG/DL 


 


Random Glucose 157 MG/DL    148 MG/DL 


 


Total Protein 4.7 GM/DL    4.6 GM/DL 


 


Albumin 1.4 GM/DL    2.0 GM/DL 


 


Calcium Level 7.1 MG/DL    7.7 MG/DL 


 


Alkaline Phosphatase 342 U/L    237 U/L 


 


Aspartate Amino Transf


(AST/SGOT) 20 U/L 


  


  


  13 U/L 


 


 


Alanine Aminotransferase


(ALT/SGPT) 16 U/L 


  


  


  12 U/L 


 


 


Total Bilirubin 0.7 MG/DL    0.7 MG/DL 


 


Sodium Level 138 MEQ/L    140 MEQ/L 


 


Potassium Level 5.0 MEQ/L    4.2 MEQ/L 


 


Chloride Level 102 MEQ/L    103 MEQ/L 


 


Carbon Dioxide Level 27.7 MEQ/L    29.5 MEQ/L 


 


Anion Gap 8 MEQ/L    8 MEQ/L 


 


Estimat Glomerular Filtration


Rate 11 ML/MIN 


  


  


  16 ML/MIN 


 


 


Protein Corrected Calcium 8.4 MG/DL    


 


Phosphorus Level    2.5 MG/DL 


 


Magnesium Level    1.9 MG/DL 


 


Lactic Acid Level    2.2 mmol/L 








Imaging





Last Impressions








Chest X-Ray 1/27/18 0000 Signed





Impressions: 





 Service Date/Time:  Saturday, January 27, 2018 03:15 - CONCLUSION:  Patchy 





 consolidation or atelectasis of the left lower lung.     Chucky Morton MD 








Objective Remarks


Physical Exam


Narrative


GENERAL: Pale ill-appearing male laying in ER stretcher in minimal respiratory 

distress on nasal cannula


SKIN: Focused skin assessment cool and dry.


HEAD: Atraumatic. Normocephalic. 


EYES:  No scleral icterus. No injection or drainage. 


ENT: No nasal bleeding or discharge.  Mucous membranes pink and dry..


NECK: Trachea midline.  Supple.


CARDIOVASCULAR: Heart rate in the 80s.  Irregularly irregular consistent with 

A. fib.


RESPIRATORY: No accessory muscle use. Clear to auscultation. Breath sounds 

equal bilaterally.  Decreased respiratory effort.


GASTROINTESTINAL: Abdomen soft, nondistended.  There is a PD-Cath in place.  

There is no drainage noted around it.


MUSCULOSKELETAL: Patient has a healing rt heel ulcer.  Venous stasis changes 

noted to his bilateral lower extremities.


NEUROLOGICAL: Awake and weak and mildly confused. No obvious cranial nerve 

deficits.  Moves both upper extremity is


BACK: Stage I/early to decubitus in his sacral area.  There is a large amount 

of maroon colored stool noted in his rectum area.





A/P


Assessment and Plan


67 year-old male with:


GI bleed


Hypotension


Encephalopathy


ESRD on peritoneal dialysis


Diabetes mellitus


Atrial fibrillation


Sacral decubitus ulcer


Right heel ulcer





Plan:


Neuro: Avoid sedatives and narcotics, monitor neuro status. Awake and alert





CV: On Levophed 2 mics, monitor HR and BP keep MAP>65mmHg


       Lactic acid 2.2





Pulm: Continue with oxygen keep sat >92%.  Bronchodilators when necessary.





GI/liver: On clear liquid diet, on Protonix drip.


       s/p EGD yesterday which showed 2 large duodenal ulcers, esophagitis, 

gastritis.





Renal/: Monitor renal function, I/O's, avoid nephrotoxins.


                nephrology Dr. Hoang. Surgery is following- Dr. Juarez? 

fungal infection requiring PD catheter removal. 





Heme:Monitor CBC, s/p transfusion 2units PRBC yesterday Hgb 8.0 from 6.4





Endocrine: SSI for glycemic control





ID: Continue abx ( Micafungin, Zosyn) monitor for signs of infections ( Fever, 

WBC) follow up on cultures.


     ID is following.





Prophylaxis: PPI/SCDs.





Lines: Right IJ CVP placed 1/25





Palliative care is following





Code status: No code DNR











Kailash Avina MD Jan 27, 2018 07:30

## 2018-01-27 NOTE — HHI.IDPN
Subjective


Subjective


Remarks


BP is still a little low


On minimal Levophed 1.5 mcgs


+ diarrhea


growing yeast again from PD fluid


no fever


BC negative @ 1-2 days


Antibiotics


zosyn


micafungin


Allergies:  


Coded Allergies:  


     morphine (Unverified  Allergy, Severe, anaphylactic, 1/25/18)





Objective


.





Vital Signs








  Date Time  Temp Pulse Resp B/P (MAP) Pulse Ox O2 Delivery O2 Flow Rate FiO2


 


1/27/18 16:00 97.8 84 28 89/55 (66) 99   


 


1/27/18 16:00  84      


 


1/27/18 15:00  89 25 80/54 (63) 95   


 


1/27/18 14:00  80 9 92/51 (65) 100   


 


1/27/18 14:00  80      


 


1/27/18 13:00  104 25 102/54 (70) 95   


 


1/27/18 12:00  98      


 


1/27/18 12:00 97.6 98 14 99/58 (72) 100   


 


1/27/18 11:01  100 25 107/67 (80) 90   


 


1/27/18 10:00  86      


 


1/27/18 10:00  86 13 92/62 (72) 100   


 


1/27/18 09:57     100 Nasal Cannula 2.00 


 


1/27/18 09:00  124 23 102/51 (68) 80   


 


1/27/18 08:00 97.9 77 16 86/52 (63)    


 


1/27/18 08:00  95      


 


1/27/18 07:01  83 21 124/67 (86) 96   


 


1/27/18 06:00  95      


 


1/27/18 06:00  76 13 86/51 (63) 93   


 


1/27/18 05:00  71 15 91/44 (60) 93   


 


1/27/18 04:00  84 19 82/50 (61) 95   


 


1/27/18 04:00  84      


 


1/27/18 03:00 97.6 81 12 85/52 (63) 97   


 


1/27/18 02:16    92/62    


 


1/27/18 02:00  88      


 


1/27/18 00:00  114      


 


1/26/18 23:00 97.5 92 11 87/53 (64) 97   


 


1/26/18 23:00  92      


 


1/26/18 22:00  91      


 


1/26/18 21:17     99 Nasal Cannula 2.00 


 


1/26/18 20:00 97.5 92 11 85/50 (62) 99   


 


1/26/18 20:00  92      


 


1/26/18 18:00  104      














 1/27/18 1/27/18 1/28/18





 15:00 23:00 07:00


 


   


 


# Bowel Movements 1  








.





Laboratory Tests








Test


  1/25/18


17:25 1/26/18


02:37 1/26/18


09:00 1/26/18


16:33


 


White Blood Count 11.4 TH/MM3  11.6 TH/MM3  9.6 TH/MM3  


 


Red Blood Count 1.99 MIL/MM3  1.90 MIL/MM3  2.77 MIL/MM3  


 


Hemoglobin 6.7 GM/DL  6.4 GM/DL  8.9 GM/DL  9.6 GM/DL 


 


Hematocrit 20.8 %  20.0 %  26.5 %  28.8 % 


 


Mean Corpuscular Volume 104.8 FL  104.9 FL  95.8 FL  


 


Mean Corpuscular Hemoglobin 33.4 PG  33.4 PG  32.1 PG  


 


Mean Corpuscular Hemoglobin


Concent 31.9 % 


  31.8 % 


  33.5 % 


  


 


 


Red Cell Distribution Width 17.7 %  17.8 %  18.6 %  


 


Platelet Count 189 TH/MM3  204 TH/MM3  136 TH/MM3  


 


Mean Platelet Volume 8.0 FL  7.6 FL  7.7 FL  


 


Neutrophils (%) (Auto) 90.9 %  92.1 %  91.3 %  


 


Lymphocytes (%) (Auto) 3.9 %  3.9 %  4.0 %  


 


Monocytes (%) (Auto) 4.7 %  3.5 %  4.3 %  


 


Eosinophils (%) (Auto) 0.3 %  0.2 %  0.3 %  


 


Basophils (%) (Auto) 0.2 %  0.3 %  0.1 %  


 


Neutrophils # (Auto) 10.4 TH/MM3  10.6 TH/MM3  8.7 TH/MM3  


 


Lymphocytes # (Auto) 0.4 TH/MM3  0.5 TH/MM3  0.4 TH/MM3  


 


Monocytes # (Auto) 0.5 TH/MM3  0.4 TH/MM3  0.4 TH/MM3  


 


Eosinophils # (Auto) 0.0 TH/MM3  0.0 TH/MM3  0.0 TH/MM3  


 


Basophils # (Auto) 0.0 TH/MM3  0.0 TH/MM3  0.0 TH/MM3  


 


CBC Comment DIFF FINAL  AUTO DIFF  DIFF FINAL  


 


Differential Comment


   


  FINAL DIFF


MANUAL  


  


 


 


Differential Total Cells


Counted 


  100 


  


  


 


 


Neutrophils % (Manual)  86 %   


 


Band Neutrophils %  3 %   


 


Lymphocytes %  6 %   


 


Monocytes %  4 %   


 


Neutrophils # (Manual)  10.4 TH/MM3   


 


Myelocytes  1 %   


 


Nucleated Red Blood Cells  1 /100 WBC   


 


Platelet Estimate  NORMAL   


 


Platelet Morphology Comment  NORMAL   


 


Polychromasia  5.5 %   


 


Basophilic Stippling  FAINT   


 


Acanthocytes  OCC   


 


Test


  1/27/18


00:39 1/27/18


03:58 


  


 


 


Hemoglobin 8.0 GM/DL  8.0 GM/DL   


 


Hematocrit 23.8 %  23.7 %   


 


White Blood Count  11.8 TH/MM3   


 


Red Blood Count  2.45 MIL/MM3   


 


Mean Corpuscular Volume  96.9 FL   


 


Mean Corpuscular Hemoglobin  32.5 PG   


 


Mean Corpuscular Hemoglobin


Concent 


  33.6 % 


  


  


 


 


Red Cell Distribution Width  19.0 %   


 


Platelet Count  108 TH/MM3   


 


Mean Platelet Volume  7.7 FL   


 


Neutrophils (%) (Auto)  95.2 %   


 


Lymphocytes (%) (Auto)  2.5 %   


 


Monocytes (%) (Auto)  2.1 %   


 


Eosinophils (%) (Auto)  0.1 %   


 


Basophils (%) (Auto)  0.1 %   


 


Neutrophils # (Auto)  11.2 TH/MM3   


 


Lymphocytes # (Auto)  0.3 TH/MM3   


 


Monocytes # (Auto)  0.2 TH/MM3   


 


Eosinophils # (Auto)  0.0 TH/MM3   


 


Basophils # (Auto)  0.0 TH/MM3   


 


CBC Comment  DIFF FINAL   


 


Differential Comment     








Laboratory Tests








Test


  1/25/18


17:25 1/26/18


09:00 1/27/18


03:58


 


Blood Urea Nitrogen 57 MG/DL  62 MG/DL  39 MG/DL 


 


Creatinine 5.39 MG/DL  5.31 MG/DL  3.88 MG/DL 


 


Random Glucose 163 MG/DL  157 MG/DL  148 MG/DL 


 


Total Protein 5.2 GM/DL  4.7 GM/DL  4.6 GM/DL 


 


Albumin 1.4 GM/DL  1.4 GM/DL  2.0 GM/DL 


 


Calcium Level 7.2 MG/DL  7.1 MG/DL  7.7 MG/DL 


 


Alkaline Phosphatase 414 U/L  342 U/L  237 U/L 


 


Aspartate Amino Transf


(AST/SGOT) 22 U/L 


  20 U/L 


  13 U/L 


 


 


Alanine Aminotransferase


(ALT/SGPT) 17 U/L 


  16 U/L 


  12 U/L 


 


 


Total Bilirubin 0.4 MG/DL  0.7 MG/DL  0.7 MG/DL 


 


Sodium Level 139 MEQ/L  138 MEQ/L  140 MEQ/L 


 


Potassium Level 4.5 MEQ/L  5.0 MEQ/L  4.2 MEQ/L 


 


Chloride Level 100 MEQ/L  102 MEQ/L  103 MEQ/L 


 


Carbon Dioxide Level 30.9 MEQ/L  27.7 MEQ/L  29.5 MEQ/L 


 


Anion Gap 8 MEQ/L  8 MEQ/L  8 MEQ/L 


 


Estimat Glomerular Filtration


Rate 11 ML/MIN 


  11 ML/MIN 


  16 ML/MIN 


 


 


Lactic Acid Level 1.3 mmol/L   2.2 mmol/L 


 


Protein Corrected Calcium 8.2 MG/DL  8.4 MG/DL  


 


Total Creatine Kinase 45 U/L   


 


Troponin I 0.05 NG/ML   


 


Phosphorus Level   2.5 MG/DL 


 


Magnesium Level   1.9 MG/DL 








Microbiology








 Date/Time


Source Procedure


Growth Status


 


 


 1/26/18 07:19


Blood Peripheral Aerobic Blood Culture - Preliminary


NO GROWTH IN 1 DAY Resulted


 


 1/26/18 07:19


Blood Peripheral Anaerobic Blood Culture - Preliminary


NO GROWTH IN 1 DAY Resulted





 1/26/18 07:04


Blood Peripheral Aerobic Blood Culture - Preliminary


NO GROWTH IN 1 DAY Resulted


 


 1/26/18 07:04


Blood Peripheral Anaerobic Blood Culture - Preliminary


NO GROWTH IN 1 DAY Resulted





 1/25/18 17:30


Blood Peripheral Aerobic Blood Culture - Preliminary


NO GROWTH IN 2 DAYS Resulted


 


 1/25/18 17:30


Blood Peripheral Anaerobic Blood Culture - Preliminary


NO GROWTH IN 2 DAYS Resulted





 1/25/18 17:25


Blood Peripheral Aerobic Blood Culture - Preliminary


NO GROWTH IN 2 DAYS Resulted


 


 1/25/18 17:25


Blood Peripheral Anaerobic Blood Culture - Preliminary


NO GROWTH IN 2 DAYS Resulted





 1/26/18 15:20


Fluid Peritoneal Fluid Fungal Smear - Final


NO FUNGAL ELEMENTS SEEN. Resulted


 


 1/26/18 15:20


Fluid Peritoneal Fluid Fungal Culture


Pending Resulted





 1/26/18 15:20


Fluid Peritoneal Fluid Acid Fast Stain


Pending Received


 


 1/26/18 15:20


Fluid Peritoneal Fluid Mycobacterial Culture


Pending Received





 1/26/18 15:20


Fluid Peritoneal Fluid Gram Stain - Final Resulted


 


 1/26/18 15:20 Body Fluid Culture - Preliminary


Candida Albicans Resulted








Imaging





Last Impressions








Chest X-Ray 1/27/18 0000 Signed





Impressions: 





 Service Date/Time:  Saturday, January 27, 2018 03:15 - CONCLUSION:  Patchy 





 consolidation or atelectasis of the left lower lung.     Chucky Morton MD 








Physical Exam


CONSTITUTIONAL/GENERAL: This is an adequately nourished patient, in no apparent 

distress. Chronically ill apearing


TUBES/LINES/DRAINS:


AV fistula in place LUE with excellent thrill





SKIN: No jaundice, rashes, or lesions. Ecchymoses on upper extremities. No 

wounds seen anteriorly. Skin temperature appropriate. Not diaphoretic. 


 EYES: Pupils equal and round and reactive. Extraocular motions intact. No 

scleral icterus. No injection or drainage. Fundi not examined.


 CARDIOVASCULAR: Regular rate and rhythm without murmurs, gallops, or rubs. No 

JVD. Peripheral pulses symmetric.


RESPIRATORY/CHEST: Symmetric, unlabored respirations. Clear to auscultation. 

Breath sounds equal bilaterally. No wheezes, rales, or rhonchi.  


GASTROINTESTINAL: Abdomen soft, non-tender, nondistended. No hepato-splenomegaly

, or palpable masses. No guarding. Bowel sounds present.


Imncontinent of liquid stool


PD cath in place 


NO peritoneal signs


GENITOURINARY: Without palpable bladder distension.  


MUSCULOSKELETAL: Extremities without clubbing, cyanosis, or edema. No joint 

tenderness or effusion noted. No calf tenderness. No mottling or clubbing.


LYMPHATICS: No palpable cervical or supraclavicular adenopathy.


NEUROLOGICAL: Awake and alert. Motor and sensory grossly within normal limits. 

Follows commands. confused. Moves all extremities.


PSYCHIATRIC: No obvious anxiety/depression. no apparent hallucinations or other 

psychotic thought process.








Assessment & Plan


Remarks


Assessment and Plan


Assessment and Plan


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


   - again growin C albicans


case was dw Dr Hoang: he ervin me that clx grw out C.albicans


Sepis suspeced on presentation


sp GI bleed 





   - catheter needs to be removed


   fu new PD fluid cl;x


   dc  zosyn


   chk stool for C.diff


   will switch to flucoanzol


Discussed Condition With


Valerie Levi RN, MD Jan 27, 2018 16:54

## 2018-01-27 NOTE — HHI.PR
__________________________________________________





Subjective


Subjective Notes


He feels much better, would like to get up and walk.


He would like to continue peritoneal dialysis if possible, he says his wife 

thinks because they are getting older, they may not be able to do it. He has 

hooked himself up to the machine in the past and thinks he can continue to do 

it.





Objective


Vitals/I&O





Vital Signs








  Date Time  Temp Pulse Resp B/P (MAP) Pulse Ox O2 Delivery O2 Flow Rate FiO2


 


1/27/18 09:57     100 Nasal Cannula 2.00 


 


1/27/18 06:00  95      


 


1/27/18 03:00 97.6  12 85/52 (63)    








Labs





Laboratory Tests








Test


  1/26/18


16:33 1/27/18


00:39 1/27/18


03:58


 


Hemoglobin 9.6  8.0  8.0 


 


Hematocrit 28.8  23.8  23.7 


 


White Blood Count   11.8 


 


Red Blood Count   2.45 


 


Mean Corpuscular Volume   96.9 


 


Mean Corpuscular Hemoglobin   32.5 


 


Mean Corpuscular Hemoglobin


Concent 


  


  33.6 


 


 


Red Cell Distribution Width   19.0 


 


Platelet Count   108 


 


Mean Platelet Volume   7.7 


 


Neutrophils (%) (Auto)   95.2 


 


Lymphocytes (%) (Auto)   2.5 


 


Monocytes (%) (Auto)   2.1 


 


Eosinophils (%) (Auto)   0.1 


 


Basophils (%) (Auto)   0.1 


 


Neutrophils # (Auto)   11.2 


 


Lymphocytes # (Auto)   0.3 


 


Monocytes # (Auto)   0.2 


 


Eosinophils # (Auto)   0.0 


 


Basophils # (Auto)   0.0 


 


CBC Comment   DIFF FINAL 


 


Differential Comment    


 


Blood Urea Nitrogen   39 


 


Creatinine   3.88 


 


Random Glucose   148 


 


Total Protein   4.6 


 


Albumin   2.0 


 


Calcium Level   7.7 


 


Phosphorus Level   2.5 


 


Magnesium Level   1.9 


 


Alkaline Phosphatase   237 


 


Aspartate Amino Transf


(AST/SGOT) 


  


  13 


 


 


Alanine Aminotransferase


(ALT/SGPT) 


  


  12 


 


 


Total Bilirubin   0.7 


 


Sodium Level   140 


 


Potassium Level   4.2 


 


Chloride Level   103 


 


Carbon Dioxide Level   29.5 


 


Anion Gap   8 


 


Estimat Glomerular Filtration


Rate 


  


  16 


 


 


Lactic Acid Level   2.2 














 Date/Time


Source Procedure


Growth Status


 


 


 1/26/18 07:19


Blood Peripheral Aerobic Blood Culture - Preliminary


NO GROWTH IN 1 DAY Resulted


 


 1/26/18 07:19


Blood Peripheral Anaerobic Blood Culture - Preliminary


NO GROWTH IN 1 DAY Resulted





 1/26/18 15:20


Fluid Peritoneal Fluid Fungal Smear - Final


NO FUNGAL ELEMENTS SEEN. Resulted


 


 1/26/18 15:20


Fluid Peritoneal Fluid Fungal Culture


Pending Resulted








Narrative Exam


Abdominal exam benign. No peritonitis present. PD catheter exit site 

uncomplicated.





A/P


Assessment and Plan


PD catheter in place, apparent fungal infection requiring PD catheter removal. 


Cultures sent. Initial results- no fungal elements seen. Final culture pending.


Pt clinically improved.


Discussed leaving PD catheter in and resuming PD, he would like that if 

possible.


I will follow up on Monday. Can discuss logistics with Dr Hoang at that time.











Donny Juarez MD Jan 27, 2018 11:35

## 2018-01-27 NOTE — HHI.GIFU
Subjective


Remarks


Resting in the bed feels somewhat better today


Still has some mild hoarseness but much improved and able to communicate better


Pale mild improved


Melena stool 1


 (Anjali Peck)





Objective


Vitals I&O





Vital Signs








  Date Time  Temp Pulse Resp B/P (MAP) Pulse Ox O2 Delivery O2 Flow Rate FiO2


 


1/27/18 09:57     100 Nasal Cannula 2.00 


 


1/27/18 06:00  95      


 


1/27/18 04:00  84      


 


1/27/18 03:00 97.6 81 12 85/52 (63) 97   


 


1/27/18 02:16    92/62    


 


1/27/18 02:00  88      


 


1/27/18 00:00  114      


 


1/26/18 23:00 97.5 92 11 87/53 (64) 97   


 


1/26/18 23:00  92      


 


1/26/18 22:00  91      


 


1/26/18 21:17     99 Nasal Cannula 2.00 


 


1/26/18 20:00 97.5 92 11 85/50 (62) 99   


 


1/26/18 20:00  92      


 


1/26/18 18:00  104      


 


1/26/18 16:00  160      














I/O      


 


 1/26/18 1/26/18 1/26/18 1/27/18 1/27/18 1/27/18





 07:00 15:00 23:00 07:00 15:00 23:00


 


Intake Total 1137 ml 100 ml  50 ml  


 


Output Total   250 ml   


 


Balance 1137 ml 100 ml -250 ml 50 ml  


 


      


 


Intake IV Total 337 ml   50 ml  


 


Packed Cells 800 ml     


 


Other  100 ml    


 


Output Hemodialysis   250 ml   


 


# Voids 0   0  


 


# Bowel Movements    1  








Laboratory





Laboratory Tests








Test


  1/26/18


16:33 1/27/18


00:39 1/27/18


03:58 1/27/18


12:34


 


Hemoglobin 9.6  8.0  8.0  


 


Hematocrit 28.8  23.8  23.7  


 


White Blood Count   11.8  


 


Red Blood Count   2.45  


 


Mean Corpuscular Volume   96.9  


 


Mean Corpuscular Hemoglobin   32.5  


 


Mean Corpuscular Hemoglobin


Concent 


  


  33.6 


  


 


 


Red Cell Distribution Width   19.0  


 


Platelet Count   108  


 


Mean Platelet Volume   7.7  


 


Neutrophils (%) (Auto)   95.2  


 


Lymphocytes (%) (Auto)   2.5  


 


Monocytes (%) (Auto)   2.1  


 


Eosinophils (%) (Auto)   0.1  


 


Basophils (%) (Auto)   0.1  


 


Neutrophils # (Auto)   11.2  


 


Lymphocytes # (Auto)   0.3  


 


Monocytes # (Auto)   0.2  


 


Eosinophils # (Auto)   0.0  


 


Basophils # (Auto)   0.0  


 


CBC Comment   DIFF FINAL  


 


Differential Comment     


 


Blood Urea Nitrogen   39  


 


Creatinine   3.88  


 


Random Glucose   148  


 


Total Protein   4.6  


 


Albumin   2.0  


 


Calcium Level   7.7  


 


Phosphorus Level   2.5  


 


Magnesium Level   1.9  


 


Alkaline Phosphatase   237  


 


Aspartate Amino Transf


(AST/SGOT) 


  


  13 


  


 


 


Alanine Aminotransferase


(ALT/SGPT) 


  


  12 


  


 


 


Total Bilirubin   0.7  


 


Sodium Level   140  


 


Potassium Level   4.2  


 


Chloride Level   103  


 


Carbon Dioxide Level   29.5  


 


Anion Gap   8  


 


Estimat Glomerular Filtration


Rate 


  


  16 


  


 


 


Lactic Acid Level   2.2  


 


Activated Partial


Thromboplast Time 


  


  


  30.9 


 














 Date/Time


Source Procedure


Growth Status


 


 


 1/26/18 07:19


Blood Peripheral Aerobic Blood Culture - Preliminary


NO GROWTH IN 1 DAY Resulted


 


 1/26/18 07:19


Blood Peripheral Anaerobic Blood Culture - Preliminary


NO GROWTH IN 1 DAY Resulted





 1/26/18 15:20


Fluid Peritoneal Fluid Fungal Smear - Final


NO FUNGAL ELEMENTS SEEN. Resulted


 


 1/26/18 15:20


Fluid Peritoneal Fluid Fungal Culture


Pending Resulted








Imaging





Last Impressions








Chest X-Ray 1/27/18 0000 Signed





Impressions: 





 Service Date/Time:  Saturday, January 27, 2018 03:15 - CONCLUSION:  Patchy 





 consolidation or atelectasis of the left lower lung.     Chucky Morton MD 








Physical Exam


HEENT: Pupils round and reactive to light; normocephalic; atraumatic; no 

jaundice.  Pale  Throat, hoarseness improved in the last 24 hours


NECK: Neck thin


CHEST:  Chest is clear , 


CARDIAC:  Regular rate and rhythm 


ABDOMEN:  Soft, mild bloating, nontender; no hepatosplenomegaly; bowel sounds 

soft


EXTREMITIES: No clubbing, cyanosis, or edema.


SKIN:  Thin turgor pale, frail; no rash; no jaundice.


CNS: oriented times three.,  Hoarseness improved but understandable today


 (Anjali Peck)





Assessment and Plan


Assessment:  


(1) Anemia


ICD Codes:  D64.9 - Anemia, unspecified


(2) GI bleed


ICD Codes:  K92.2 - Gastrointestinal hemorrhage, unspecified


Status:  Acute


(3) History of renal failure


ICD Codes:  Z87.448 - Personal history of other diseases of urinary system


Status:  Acute


Plan


Anemia, history of renal disease probable chronic,


Hoarseness improving over the past 24 hours


Melena 1 stool today





EGD done 01/26/18, results show Severe grade D esophagitis


Severe gastritis


Anemia could be related to chronic disease plus blood loss from the upper GI 

tract from above





PLAN:


Multivitamins


Diet renal ADA, clear liquids today may advance


no NSAIDs


Pantoprazole 40 mg by mouth daily, 30 minutes before eating the a.m.


Follow-up biopsy in 7-10 days


Patient will need colonoscopy in the future for his multi-medical comorbidities 

can follow-up in the office as outpatient.


duodenal ulcers enlarged that was biopsied most likely reason for the anemia, 

biopsy was done from the duodenum not from the esophagus 


Continue to monitor hemoglobin, no active bleeding now, currently stable at 8


Bowel regimen meds stool softeners and laxatives as needed


Supportive care


CODE STATUS no code DO NOT RESUSCITATE





Patient was seen by myself and Dr. Diaz, note done on his behalf


 (Anjali Peck)


Plan


Patient was seen and examined, agree with above-noted, seemed to be doing better

, okay to advance diet as tolerated, we will follow up as needed please call us 

if there is any question


 (Dajuan Diaz MD)





Problem Qualifiers





(1) Anemia:  





(2) GI bleed:  


Qualified Codes:  K92.2 - Gastrointestinal hemorrhage, unspecified








Anjali Peck Jan 27, 2018 15:15


Dajuan Diaz MD Jan 27, 2018 20:25

## 2018-01-28 VITALS
SYSTOLIC BLOOD PRESSURE: 93 MMHG | DIASTOLIC BLOOD PRESSURE: 56 MMHG | HEART RATE: 105 BPM | RESPIRATION RATE: 12 BRPM | OXYGEN SATURATION: 100 %

## 2018-01-28 VITALS — HEART RATE: 114 BPM

## 2018-01-28 VITALS
RESPIRATION RATE: 27 BRPM | OXYGEN SATURATION: 98 % | HEART RATE: 123 BPM | SYSTOLIC BLOOD PRESSURE: 100 MMHG | DIASTOLIC BLOOD PRESSURE: 64 MMHG

## 2018-01-28 VITALS
SYSTOLIC BLOOD PRESSURE: 96 MMHG | DIASTOLIC BLOOD PRESSURE: 54 MMHG | RESPIRATION RATE: 20 BRPM | OXYGEN SATURATION: 100 % | HEART RATE: 98 BPM

## 2018-01-28 VITALS
DIASTOLIC BLOOD PRESSURE: 53 MMHG | HEART RATE: 88 BPM | TEMPERATURE: 98 F | SYSTOLIC BLOOD PRESSURE: 84 MMHG | OXYGEN SATURATION: 99 % | RESPIRATION RATE: 19 BRPM

## 2018-01-28 VITALS
OXYGEN SATURATION: 99 % | RESPIRATION RATE: 27 BRPM | SYSTOLIC BLOOD PRESSURE: 107 MMHG | HEART RATE: 103 BPM | TEMPERATURE: 97.8 F | DIASTOLIC BLOOD PRESSURE: 56 MMHG

## 2018-01-28 VITALS
DIASTOLIC BLOOD PRESSURE: 58 MMHG | SYSTOLIC BLOOD PRESSURE: 93 MMHG | OXYGEN SATURATION: 99 % | HEART RATE: 104 BPM | TEMPERATURE: 97.6 F | RESPIRATION RATE: 15 BRPM

## 2018-01-28 VITALS
RESPIRATION RATE: 10 BRPM | HEART RATE: 87 BPM | TEMPERATURE: 97.7 F | DIASTOLIC BLOOD PRESSURE: 53 MMHG | SYSTOLIC BLOOD PRESSURE: 82 MMHG | OXYGEN SATURATION: 96 %

## 2018-01-28 VITALS
DIASTOLIC BLOOD PRESSURE: 52 MMHG | OXYGEN SATURATION: 97 % | HEART RATE: 113 BPM | RESPIRATION RATE: 17 BRPM | SYSTOLIC BLOOD PRESSURE: 95 MMHG

## 2018-01-28 VITALS
DIASTOLIC BLOOD PRESSURE: 61 MMHG | OXYGEN SATURATION: 99 % | HEART RATE: 116 BPM | SYSTOLIC BLOOD PRESSURE: 102 MMHG | RESPIRATION RATE: 17 BRPM

## 2018-01-28 VITALS
SYSTOLIC BLOOD PRESSURE: 94 MMHG | RESPIRATION RATE: 20 BRPM | OXYGEN SATURATION: 100 % | DIASTOLIC BLOOD PRESSURE: 59 MMHG | HEART RATE: 98 BPM

## 2018-01-28 VITALS
TEMPERATURE: 97.8 F | SYSTOLIC BLOOD PRESSURE: 92 MMHG | HEART RATE: 175 BPM | RESPIRATION RATE: 19 BRPM | DIASTOLIC BLOOD PRESSURE: 52 MMHG | OXYGEN SATURATION: 94 %

## 2018-01-28 VITALS
DIASTOLIC BLOOD PRESSURE: 40 MMHG | SYSTOLIC BLOOD PRESSURE: 85 MMHG | RESPIRATION RATE: 9 BRPM | HEART RATE: 81 BPM | OXYGEN SATURATION: 99 %

## 2018-01-28 VITALS
DIASTOLIC BLOOD PRESSURE: 58 MMHG | SYSTOLIC BLOOD PRESSURE: 98 MMHG | OXYGEN SATURATION: 99 % | RESPIRATION RATE: 23 BRPM | HEART RATE: 119 BPM

## 2018-01-28 VITALS
OXYGEN SATURATION: 98 % | TEMPERATURE: 97.2 F | SYSTOLIC BLOOD PRESSURE: 83 MMHG | RESPIRATION RATE: 11 BRPM | HEART RATE: 97 BPM | DIASTOLIC BLOOD PRESSURE: 53 MMHG

## 2018-01-28 VITALS — HEART RATE: 84 BPM

## 2018-01-28 VITALS — OXYGEN SATURATION: 94 %

## 2018-01-28 VITALS — HEART RATE: 107 BPM

## 2018-01-28 VITALS — OXYGEN SATURATION: 98 %

## 2018-01-28 VITALS — OXYGEN SATURATION: 96 %

## 2018-01-28 VITALS — OXYGEN SATURATION: 95 %

## 2018-01-28 VITALS — HEART RATE: 104 BPM

## 2018-01-28 LAB
BASOPHILS # BLD AUTO: 0 TH/MM3 (ref 0–0.2)
BASOPHILS NFR BLD: 0.1 % (ref 0–2)
BUN SERPL-MCNC: 46 MG/DL (ref 7–18)
CALCIUM SERPL-MCNC: 7.1 MG/DL (ref 8.5–10.1)
CALCIUM TP COR SERPL-MCNC: 8.5 MG/DL (ref 8.5–10.1)
CHLORIDE SERPL-SCNC: 103 MEQ/L (ref 98–107)
CREAT SERPL-MCNC: 4.46 MG/DL (ref 0.6–1.3)
EOSINOPHIL # BLD: 0.1 TH/MM3 (ref 0–0.4)
EOSINOPHIL NFR BLD: 0.9 % (ref 0–4)
ERYTHROCYTE [DISTWIDTH] IN BLOOD BY AUTOMATED COUNT: 19.4 % (ref 11.6–17.2)
GFR SERPLBLD BASED ON 1.73 SQ M-ARVRAT: 13 ML/MIN (ref 89–?)
GLUCOSE SERPL-MCNC: 55 MG/DL (ref 74–106)
HCO3 BLD-SCNC: 27.6 MEQ/L (ref 21–32)
HCT VFR BLD CALC: 25.6 % (ref 39–51)
HGB BLD-MCNC: 8.4 GM/DL (ref 13–17)
LYMPHOCYTES # BLD AUTO: 0.3 TH/MM3 (ref 1–4.8)
LYMPHOCYTES NFR BLD AUTO: 3.8 % (ref 9–44)
MCH RBC QN AUTO: 32.3 PG (ref 27–34)
MCHC RBC AUTO-ENTMCNC: 32.9 % (ref 32–36)
MCV RBC AUTO: 98.2 FL (ref 80–100)
MONOCYTE #: 0.3 TH/MM3 (ref 0–0.9)
MONOCYTES NFR BLD: 2.8 % (ref 0–8)
NEUTROPHILS # BLD AUTO: 8.5 TH/MM3 (ref 1.8–7.7)
NEUTROPHILS NFR BLD AUTO: 92.4 % (ref 16–70)
PLATELET # BLD: 113 TH/MM3 (ref 150–450)
PMV BLD AUTO: 8 FL (ref 7–11)
PROT SERPL-MCNC: 4.6 GM/DL (ref 6.4–8.2)
RBC # BLD AUTO: 2.6 MIL/MM3 (ref 4.5–5.9)
SODIUM SERPL-SCNC: 140 MEQ/L (ref 136–145)
WBC # BLD AUTO: 9.2 TH/MM3 (ref 4–11)

## 2018-01-28 RX ADMIN — HUMAN INSULIN SCH: 100 INJECTION, SOLUTION SUBCUTANEOUS at 00:45

## 2018-01-28 RX ADMIN — PANTOPRAZOLE SCH MG: 40 TABLET, DELAYED RELEASE ORAL at 09:00

## 2018-01-28 RX ADMIN — PANTOPRAZOLE SODIUM SCH MLS/HR: 40 INJECTION, POWDER, FOR SOLUTION INTRAVENOUS at 05:35

## 2018-01-28 RX ADMIN — NOREPINEPHRINE BITARTRATE PRN MLS/HR: 1 INJECTION INTRAVENOUS at 02:35

## 2018-01-28 RX ADMIN — HUMAN INSULIN SCH: 100 INJECTION, SOLUTION SUBCUTANEOUS at 08:45

## 2018-01-28 RX ADMIN — PANTOPRAZOLE SODIUM SCH MLS/HR: 40 INJECTION, POWDER, FOR SOLUTION INTRAVENOUS at 15:35

## 2018-01-28 RX ADMIN — HUMAN INSULIN SCH: 100 INJECTION, SOLUTION SUBCUTANEOUS at 18:33

## 2018-01-28 RX ADMIN — HUMAN INSULIN SCH: 100 INJECTION, SOLUTION SUBCUTANEOUS at 20:32

## 2018-01-28 RX ADMIN — DEXTROSE MONOHYDRATE SCH MLS/HR: 5 INJECTION, SOLUTION INTRAVENOUS at 10:08

## 2018-01-28 RX ADMIN — PANTOPRAZOLE SCH MG: 40 TABLET, DELAYED RELEASE ORAL at 13:46

## 2018-01-28 RX ADMIN — CHLORHEXIDINE GLUCONATE SCH PACK: 500 CLOTH TOPICAL at 04:00

## 2018-01-28 RX ADMIN — HUMAN INSULIN SCH: 100 INJECTION, SOLUTION SUBCUTANEOUS at 04:45

## 2018-01-28 RX ADMIN — FLUCONAZOLE SCH MLS/HR: 2 INJECTION, SOLUTION INTRAVENOUS at 18:33

## 2018-01-28 RX ADMIN — Medication SCH ML: at 20:34

## 2018-01-28 RX ADMIN — HUMAN INSULIN SCH: 100 INJECTION, SOLUTION SUBCUTANEOUS at 12:45

## 2018-01-28 RX ADMIN — Medication SCH ML: at 09:00

## 2018-01-28 RX ADMIN — STANDARDIZED SENNA CONCENTRATE AND DOCUSATE SODIUM SCH TAB: 8.6; 5 TABLET, FILM COATED ORAL at 20:33

## 2018-01-28 RX ADMIN — STANDARDIZED SENNA CONCENTRATE AND DOCUSATE SODIUM SCH TAB: 8.6; 5 TABLET, FILM COATED ORAL at 09:00

## 2018-01-28 NOTE — HHI.CCPN
Subjective


Remarks/Hospital Course


67 year-old male with a medical history significant for end-stage renal disease 

on peritoneal dialysis who was noted to be severely hypotensive and lethargic 

at the dialysis center on 1/25 and 911 was called and patient was transferred 

to [facility] ER.  On arrival he was noted to be hypotensive with SBP 70s 

and heart rate 100s.  He also reportedly had significant melena and rectal 

bleeding following arrival.  A right IJ central line was placed by ER 

physician.  Patient's wife arrived and wanted to make patient DNR status and 

did not want blood transfusions and wished transferring to hospice however 

subsequently patient woke up and wanted to continue aggressive care including 

blood transfusions.  ER physician contacted me questioning patient's wife's 

ability to make decisions for him due to concern for secondary gain as well as 

possible psychiatric illness.  Patient was accepted for admission by critical 

care medicine service.  4 units PRBCs were ordered to be transfused stat by ER 

physician.  When I arrived to see the patient he was laying in the ER stretcher 

on nasal cannula on 5 mics of Levophed.  He is drowsy but easily arousable.  He 

knew he was at the hospital.


He could not give me details of his history.  He did tell me that Dr. Hoang is 

his nephrologist.





1/27 Patient is on Levophed 2 mics, s/p HD yesterday. Awake and alert on 

Protonix drip. s/p EGD yesterday showed 2 large Duodenal ulcer, esophagitis and 

gastritis.





Subjective





1/28:  Afebrile.  Currently off norepinephrine but MAP is around 60 currently.  

States he is okay with removing peritoneal dialysis catheter.  Will discuss 

with general surgery in nephrology in a.m.





Objective





Vital Signs








  Date Time  Temp Pulse Resp B/P (MAP) Pulse Ox O2 Delivery O2 Flow Rate FiO2


 


1/28/18 18:00  107      


 


1/28/18 16:00 97.8  27 107/56 (73) 99   


 


1/28/18 08:22      Nasal Cannula 2.00 














Intake and Output   


 


 1/28/18 1/28/18 1/29/18





 08:00 16:00 00:00


 


Intake Total  201 ml 422 ml


 


Balance  201 ml 422 ml








Result Diagram:  


1/28/18 0530                                                                   

             1/28/18 0530





Other Results





Microbiology








 Date/Time


Source Procedure


Growth Status


 


 


 1/26/18 07:19


Blood Peripheral Aerobic Blood Culture - Preliminary


NO GROWTH IN 2 DAYS Resulted


 


 1/26/18 07:19


Blood Peripheral Anaerobic Blood Culture - Preliminary


NO GROWTH IN 2 DAYS Resulted





 1/26/18 15:20


Fluid Peritoneal Fluid Fungal Smear - Final


NO FUNGAL ELEMENTS SEEN. Resulted


 


 1/26/18 15:20


Fluid Peritoneal Fluid Fungal Culture


Pending Resulted








Imaging





Last Impressions








Chest X-Ray 1/27/18 0000 Signed





Impressions: 





 Service Date/Time:  Saturday, January 27, 2018 03:15 - CONCLUSION:  Patchy 





 consolidation or atelectasis of the left lower lung.     Chucky Morton MD 








Objective Remarks


GENERAL: 67 year  male resting in bed in no acute distress


SKIN: Woman dry.  No rash


HEAD: Atraumatic. Normocephalic. 


EYES:  No scleral icterus. No injection or drainage. 


ENT: No nasal bleeding or discharge.  Mucous membranes pink and dry..


NECK: Trachea midline.  Supple.


CARDIOVASCULAR: Heart rate in the 80s.  Irregularly irregular consistent with 

A. fib.


RESPIRATORY: No accessory muscle use. Clear to auscultation. Breath sounds 

equal bilaterally.  Decreased respiratory effort.


GASTROINTESTINAL: Abdomen soft, nondistended.  There is a PD-Cath in place.  No 

signs erythema or drainage.


MUSCULOSKELETAL: Patient has a well-healed rt heel ulcer.  Venous stasis 

changes noted to his bilateral lower extremities.


NEUROLOGICAL: Awake and weak and mildly confused. No obvious cranial nerve 

deficits.  Moves both upper extremity is


BACK: Stage I/early to decubitus in his sacral area.


Vascular Central Line Catheter:  Yes


Assessment to:  Continue


Line:  Central Venous Catheter


Side:  Right


Location:  Internal, Jugular





A/P


Assessment and Plan


Neuro/Psych: 





Depression


Chronic Pain syndrome


History of CVA 1986/2007





Holding paroxetine 10 mg daily/home medication.  Resume when clinically 

indicated


Avoid sedatives and narcotics including tramadol/home medication, monitor neuro 

status. Awake and alert


Holding clopidogrel 75 mg daily.  Resume when okay with GI





CV: 





Atrial fibrillation rate controlled


Straight hypertension





monitor HR and BP keep MAP>65mmHg


       Lactic acid 2.2


Holding clopidogrel 75 mg daily light of duodenal ulcer


Holding lisinopril 20 mill grams daily and metoprolol 50 mill grams daily in 

light of hypotension


Holding isosorbide mononitrate 30 mg daily light of borderline blood pressures


As needed norepinephrine to maintain mean arterial pressure gradient 65





Pulm: 





Continue with oxygen keep sat >92%.  


Bronchodilators when necessary.





GI/liver: 





Upper GI bleed secondary to duodenal ulcer





On clear liquid diet,


On pantoprazole 40 mg by mouth daily


       s/p EGD/26 which showed 2 large duodenal ulcers, grade D esophagitis, 

gastritis.





Renal/: 





Monitor renal function, I/O's, avoid nephrotoxins.


                nephrology Dr. Hoang. Surgery is following- Dr. Juarez? 

fungal infection requiring PD catheter removal. 





Heme:





Normocytic anemia


Thrombocytopenia





Monitor CBC, s/p transfusion 2units PRBC 





Endocrine: 





Diabetes mellitus





SSI for glycemic control with low Novulog before meals at bedtime


Holding Tradjenta 5 mg daily





ID: 





Infected peritoneal dialysis catheter





Continue /fluconazole 200 mg IV daily) monitor for signs of infections ( Fever, 

WBC) follow up on cultures.


     ID is following.





All cultures from this hospital admission no growth to date.


Patient wishes to discuss with nephrology and general surgery in a.m.  Wishes 

to have dialysis catheter removed at this time.





MSK:  





Right heel ulcer


Sacral decubitus ulcer





Wound care evaluate and treat





Prophylaxis: PPI/SCDs.





Lines: Right IJ CVP placed 1/25





Level II follow-up











Chase Steen MD Jan 28, 2018 20:55

## 2018-01-28 NOTE — HHI.NPPN
Subjective


Interval History


All the notes were reviewed. Peritoneal fluid culture is positive for Candida 

albicans. Seen by ID. On Diflucan.





Review of Systems


General


Constitutional:  Fatigue





Objective Data


Data





Vital Signs








  Date Time  Temp Pulse Resp B/P (MAP) Pulse Ox O2 Delivery O2 Flow Rate FiO2


 


1/28/18 06:00  116      


 


1/28/18 04:06     94 Nasal Cannula 2.00 


 


1/28/18 04:00  87      


 


1/28/18 04:00 97.7 87 10 82/53 (63) 96   


 


1/28/18 02:35  104  92/54    


 


1/28/18 02:00  114      


 


1/28/18 00:18     96 Nasal Cannula 2.00 


 


1/28/18 00:00  175      


 


1/28/18 00:00 97.8 175 19 92/52 (65) 94   


 


1/27/18 22:00  80      


 


1/27/18 20:15     94 Nasal Cannula 2.00 


 


1/27/18 20:00  79      


 


1/27/18 20:00 97.5 79 17 88/54 (65) 96   


 


1/27/18 18:00  84      


 


1/27/18 17:43  86  87/55    


 


1/27/18 16:00 97.8 84 28 89/55 (66) 99   


 


1/27/18 16:00  84      


 


1/27/18 15:00  89 25 80/54 (63) 95   


 


1/27/18 14:00  80 9 92/51 (65) 100   


 


1/27/18 14:00  80      


 


1/27/18 13:00  104 25 102/54 (70) 95   


 


1/27/18 12:00  98      


 


1/27/18 12:00 97.6 98 14 99/58 (72) 100   


 


1/27/18 11:01  100 25 107/67 (80) 90   


 


1/27/18 10:00  86      


 


1/27/18 10:00  86 13 92/62 (72) 100   


 


1/27/18 09:57     100 Nasal Cannula 2.00 


 


1/27/18 09:00  124 23 102/51 (68) 80   








-:  


1/28/18 0530                                                                   

             1/28/18 0530








Physical Exam


General


Appearance:  Malnourished





Neck


Neck Exam:  Neck Supple





Pulmonary


Resp Exam:  Clear Bilaterally





Cardiology


CV Exam:  Regular





Gastrointestinal/Abdomen


GI Exam:  Soft





Extremeties


Extremities Exam:  No Edema





Assessment/Plan


Problem List:  


(1) ESRD (end stage renal disease) on dialysis


ICD Codes:  N18.6 - End stage renal disease; Z99.2 - Dependence on renal 

dialysis


Plan:  Continue HD support. Was dialyzed on 1/26.


Monitor fluid and electrolytes. Next dialysis will be on 1/29. 





(2) Peritonitis associated with peritoneal dialysis


ICD Codes:  T85.71XA - Infection and inflammatory reaction due to peritoneal 

dialysis catheter, initial encounter


Plan:  Peritoneal fluid culture positive for Candida. 


PD catheter needs to be removed. 


ID and surgery following. 





(3) Hypotension


ICD Codes:  I95.9 - Hypotension, unspecified


Status:  Acute


Plan:  Sepsis present on admission. 


Continue supportive care. 





(4) GI bleed


ICD Codes:  K92.2 - Gastrointestinal hemorrhage, unspecified


Status:  Acute


Plan:  s/p EGD: duodenal ulcer, gastritis, esophagitis. 


Received blood transfusion. Hemoglobin is 8.


On Protonix drip. 








(5) Anemia


ICD Codes:  D64.9 - Anemia, unspecified





Problem Qualifiers





(1) Hypotension:  


Qualified Codes:  I95.9 - Hypotension, unspecified


(2) GI bleed:  


Qualified Codes:  K92.2 - Gastrointestinal hemorrhage, unspecified


(3) Anemia:  








Luiz Leos MD Jan 28, 2018 08:29

## 2018-01-29 VITALS
RESPIRATION RATE: 12 BRPM | OXYGEN SATURATION: 97 % | SYSTOLIC BLOOD PRESSURE: 93 MMHG | DIASTOLIC BLOOD PRESSURE: 63 MMHG | HEART RATE: 81 BPM

## 2018-01-29 VITALS
OXYGEN SATURATION: 96 % | HEART RATE: 74 BPM | RESPIRATION RATE: 19 BRPM | DIASTOLIC BLOOD PRESSURE: 52 MMHG | SYSTOLIC BLOOD PRESSURE: 96 MMHG

## 2018-01-29 VITALS
OXYGEN SATURATION: 98 % | HEART RATE: 107 BPM | DIASTOLIC BLOOD PRESSURE: 53 MMHG | SYSTOLIC BLOOD PRESSURE: 96 MMHG | RESPIRATION RATE: 22 BRPM

## 2018-01-29 VITALS
HEART RATE: 89 BPM | SYSTOLIC BLOOD PRESSURE: 98 MMHG | RESPIRATION RATE: 19 BRPM | DIASTOLIC BLOOD PRESSURE: 53 MMHG | OXYGEN SATURATION: 98 % | TEMPERATURE: 98.6 F

## 2018-01-29 VITALS
DIASTOLIC BLOOD PRESSURE: 50 MMHG | HEART RATE: 76 BPM | TEMPERATURE: 97.4 F | SYSTOLIC BLOOD PRESSURE: 85 MMHG | RESPIRATION RATE: 13 BRPM | OXYGEN SATURATION: 95 %

## 2018-01-29 VITALS
SYSTOLIC BLOOD PRESSURE: 101 MMHG | OXYGEN SATURATION: 100 % | DIASTOLIC BLOOD PRESSURE: 59 MMHG | HEART RATE: 91 BPM | RESPIRATION RATE: 16 BRPM

## 2018-01-29 VITALS — HEART RATE: 100 BPM

## 2018-01-29 VITALS — HEART RATE: 89 BPM

## 2018-01-29 VITALS
RESPIRATION RATE: 23 BRPM | DIASTOLIC BLOOD PRESSURE: 54 MMHG | OXYGEN SATURATION: 97 % | HEART RATE: 105 BPM | SYSTOLIC BLOOD PRESSURE: 98 MMHG

## 2018-01-29 VITALS
HEART RATE: 96 BPM | SYSTOLIC BLOOD PRESSURE: 99 MMHG | DIASTOLIC BLOOD PRESSURE: 59 MMHG | OXYGEN SATURATION: 100 % | RESPIRATION RATE: 18 BRPM

## 2018-01-29 VITALS
DIASTOLIC BLOOD PRESSURE: 65 MMHG | OXYGEN SATURATION: 95 % | RESPIRATION RATE: 26 BRPM | SYSTOLIC BLOOD PRESSURE: 91 MMHG | HEART RATE: 113 BPM

## 2018-01-29 VITALS
HEART RATE: 86 BPM | RESPIRATION RATE: 23 BRPM | OXYGEN SATURATION: 93 % | TEMPERATURE: 97.3 F | SYSTOLIC BLOOD PRESSURE: 116 MMHG | DIASTOLIC BLOOD PRESSURE: 65 MMHG

## 2018-01-29 VITALS
SYSTOLIC BLOOD PRESSURE: 98 MMHG | OXYGEN SATURATION: 96 % | DIASTOLIC BLOOD PRESSURE: 55 MMHG | HEART RATE: 74 BPM | RESPIRATION RATE: 12 BRPM

## 2018-01-29 VITALS
DIASTOLIC BLOOD PRESSURE: 55 MMHG | RESPIRATION RATE: 16 BRPM | SYSTOLIC BLOOD PRESSURE: 98 MMHG | HEART RATE: 96 BPM | OXYGEN SATURATION: 100 %

## 2018-01-29 VITALS — HEART RATE: 99 BPM

## 2018-01-29 VITALS
DIASTOLIC BLOOD PRESSURE: 72 MMHG | OXYGEN SATURATION: 89 % | HEART RATE: 127 BPM | SYSTOLIC BLOOD PRESSURE: 91 MMHG | RESPIRATION RATE: 27 BRPM

## 2018-01-29 VITALS
OXYGEN SATURATION: 94 % | HEART RATE: 123 BPM | SYSTOLIC BLOOD PRESSURE: 103 MMHG | RESPIRATION RATE: 27 BRPM | DIASTOLIC BLOOD PRESSURE: 64 MMHG

## 2018-01-29 VITALS
DIASTOLIC BLOOD PRESSURE: 60 MMHG | OXYGEN SATURATION: 97 % | RESPIRATION RATE: 15 BRPM | HEART RATE: 84 BPM | TEMPERATURE: 98 F | SYSTOLIC BLOOD PRESSURE: 111 MMHG

## 2018-01-29 VITALS
OXYGEN SATURATION: 86 % | HEART RATE: 92 BPM | SYSTOLIC BLOOD PRESSURE: 105 MMHG | DIASTOLIC BLOOD PRESSURE: 55 MMHG | RESPIRATION RATE: 18 BRPM

## 2018-01-29 VITALS
SYSTOLIC BLOOD PRESSURE: 98 MMHG | HEART RATE: 89 BPM | RESPIRATION RATE: 16 BRPM | OXYGEN SATURATION: 99 % | DIASTOLIC BLOOD PRESSURE: 52 MMHG

## 2018-01-29 VITALS
HEART RATE: 97 BPM | OXYGEN SATURATION: 99 % | SYSTOLIC BLOOD PRESSURE: 108 MMHG | DIASTOLIC BLOOD PRESSURE: 60 MMHG | RESPIRATION RATE: 18 BRPM

## 2018-01-29 VITALS
RESPIRATION RATE: 13 BRPM | OXYGEN SATURATION: 100 % | DIASTOLIC BLOOD PRESSURE: 57 MMHG | SYSTOLIC BLOOD PRESSURE: 97 MMHG | HEART RATE: 84 BPM

## 2018-01-29 VITALS
OXYGEN SATURATION: 99 % | TEMPERATURE: 97.7 F | RESPIRATION RATE: 20 BRPM | HEART RATE: 115 BPM | SYSTOLIC BLOOD PRESSURE: 97 MMHG | DIASTOLIC BLOOD PRESSURE: 64 MMHG

## 2018-01-29 VITALS
DIASTOLIC BLOOD PRESSURE: 72 MMHG | HEART RATE: 105 BPM | OXYGEN SATURATION: 96 % | SYSTOLIC BLOOD PRESSURE: 104 MMHG | RESPIRATION RATE: 11 BRPM

## 2018-01-29 VITALS — HEART RATE: 95 BPM

## 2018-01-29 VITALS
OXYGEN SATURATION: 97 % | RESPIRATION RATE: 18 BRPM | HEART RATE: 108 BPM | DIASTOLIC BLOOD PRESSURE: 50 MMHG | SYSTOLIC BLOOD PRESSURE: 106 MMHG

## 2018-01-29 VITALS
RESPIRATION RATE: 13 BRPM | HEART RATE: 93 BPM | OXYGEN SATURATION: 94 % | SYSTOLIC BLOOD PRESSURE: 95 MMHG | DIASTOLIC BLOOD PRESSURE: 54 MMHG

## 2018-01-29 VITALS
SYSTOLIC BLOOD PRESSURE: 84 MMHG | HEART RATE: 84 BPM | OXYGEN SATURATION: 98 % | DIASTOLIC BLOOD PRESSURE: 51 MMHG | TEMPERATURE: 98.2 F | RESPIRATION RATE: 21 BRPM

## 2018-01-29 VITALS
OXYGEN SATURATION: 91 % | RESPIRATION RATE: 18 BRPM | DIASTOLIC BLOOD PRESSURE: 67 MMHG | SYSTOLIC BLOOD PRESSURE: 89 MMHG | HEART RATE: 116 BPM

## 2018-01-29 VITALS — HEART RATE: 97 BPM

## 2018-01-29 VITALS
DIASTOLIC BLOOD PRESSURE: 51 MMHG | RESPIRATION RATE: 14 BRPM | SYSTOLIC BLOOD PRESSURE: 109 MMHG | HEART RATE: 79 BPM | OXYGEN SATURATION: 97 %

## 2018-01-29 VITALS — OXYGEN SATURATION: 95 %

## 2018-01-29 VITALS — HEART RATE: 88 BPM

## 2018-01-29 VITALS — HEART RATE: 101 BPM

## 2018-01-29 VITALS — OXYGEN SATURATION: 99 %

## 2018-01-29 VITALS — HEART RATE: 76 BPM

## 2018-01-29 LAB
ALBUMIN SERPL-MCNC: 1.6 GM/DL (ref 3.4–5)
ALP SERPL-CCNC: 239 U/L (ref 45–117)
ALT SERPL-CCNC: 15 U/L (ref 12–78)
AST SERPL-CCNC: 13 U/L (ref 15–37)
BASOPHILS # BLD AUTO: 0.1 TH/MM3 (ref 0–0.2)
BASOPHILS NFR BLD: 0.5 % (ref 0–2)
BILIRUB SERPL-MCNC: 0.5 MG/DL (ref 0.2–1)
BUN SERPL-MCNC: 51 MG/DL (ref 7–18)
CALCIUM SERPL-MCNC: 6.8 MG/DL (ref 8.5–10.1)
CALCIUM TP COR SERPL-MCNC: 8.1 MG/DL (ref 8.5–10.1)
CHLORIDE SERPL-SCNC: 100 MEQ/L (ref 98–107)
CREAT SERPL-MCNC: 5.04 MG/DL (ref 0.6–1.3)
EOSINOPHIL # BLD: 0.1 TH/MM3 (ref 0–0.4)
EOSINOPHIL NFR BLD: 0.8 % (ref 0–4)
ERYTHROCYTE [DISTWIDTH] IN BLOOD BY AUTOMATED COUNT: 18.7 % (ref 11.6–17.2)
GFR SERPLBLD BASED ON 1.73 SQ M-ARVRAT: 12 ML/MIN (ref 89–?)
GLUCOSE SERPL-MCNC: 90 MG/DL (ref 74–106)
HCO3 BLD-SCNC: 27.5 MEQ/L (ref 21–32)
HCT VFR BLD CALC: 24.5 % (ref 39–51)
HGB BLD-MCNC: 8.3 GM/DL (ref 13–17)
LYMPHOCYTES # BLD AUTO: 0.4 TH/MM3 (ref 1–4.8)
LYMPHOCYTES NFR BLD AUTO: 3.5 % (ref 9–44)
MAGNESIUM SERPL-MCNC: 2.1 MG/DL (ref 1.5–2.5)
MCH RBC QN AUTO: 33.5 PG (ref 27–34)
MCHC RBC AUTO-ENTMCNC: 33.9 % (ref 32–36)
MCV RBC AUTO: 98.9 FL (ref 80–100)
MONOCYTE #: 0.3 TH/MM3 (ref 0–0.9)
MONOCYTES NFR BLD: 3.1 % (ref 0–8)
NEUTROPHILS # BLD AUTO: 9.6 TH/MM3 (ref 1.8–7.7)
NEUTROPHILS NFR BLD AUTO: 92.1 % (ref 16–70)
PHOSPHATE SERPL-MCNC: 3.6 MG/DL (ref 2.5–4.9)
PLATELET # BLD: 111 TH/MM3 (ref 150–450)
PMV BLD AUTO: 8.2 FL (ref 7–11)
PROT SERPL-MCNC: 4.7 GM/DL (ref 6.4–8.2)
RBC # BLD AUTO: 2.48 MIL/MM3 (ref 4.5–5.9)
SODIUM SERPL-SCNC: 137 MEQ/L (ref 136–145)
WBC # BLD AUTO: 10.4 TH/MM3 (ref 4–11)

## 2018-01-29 RX ADMIN — HUMAN INSULIN SCH: 100 INJECTION, SOLUTION SUBCUTANEOUS at 12:00

## 2018-01-29 RX ADMIN — STANDARDIZED SENNA CONCENTRATE AND DOCUSATE SODIUM SCH TAB: 8.6; 5 TABLET, FILM COATED ORAL at 08:06

## 2018-01-29 RX ADMIN — HUMAN INSULIN SCH: 100 INJECTION, SOLUTION SUBCUTANEOUS at 08:00

## 2018-01-29 RX ADMIN — NOREPINEPHRINE BITARTRATE PRN MLS/HR: 1 INJECTION INTRAVENOUS at 08:07

## 2018-01-29 RX ADMIN — FLUCONAZOLE SCH MLS/HR: 2 INJECTION, SOLUTION INTRAVENOUS at 16:46

## 2018-01-29 RX ADMIN — GELATIN ABSORBABLE SPONGE 12-7 MM PRN FOAM: 12-7 MISC at 11:35

## 2018-01-29 RX ADMIN — CALCIUM CARBONATE (ANTACID) CHEW TAB 500 MG SCH MG: 500 CHEW TAB at 21:26

## 2018-01-29 RX ADMIN — CHLORHEXIDINE GLUCONATE SCH PACK: 500 CLOTH TOPICAL at 04:00

## 2018-01-29 RX ADMIN — Medication SCH ML: at 21:26

## 2018-01-29 RX ADMIN — Medication SCH ML: at 08:06

## 2018-01-29 RX ADMIN — PANTOPRAZOLE SCH MG: 40 TABLET, DELAYED RELEASE ORAL at 08:06

## 2018-01-29 RX ADMIN — HUMAN INSULIN SCH: 100 INJECTION, SOLUTION SUBCUTANEOUS at 21:00

## 2018-01-29 RX ADMIN — NOREPINEPHRINE BITARTRATE PRN MLS/HR: 1 INJECTION INTRAVENOUS at 22:05

## 2018-01-29 RX ADMIN — EPOETIN ALFA PRN UNITS: 10000 SOLUTION INTRAVENOUS; SUBCUTANEOUS at 11:12

## 2018-01-29 RX ADMIN — STANDARDIZED SENNA CONCENTRATE AND DOCUSATE SODIUM SCH TAB: 8.6; 5 TABLET, FILM COATED ORAL at 21:00

## 2018-01-29 NOTE — HHI.PR
Addendum to Inpatient Note


Additional Information


pt seen around 1200 today


full note to follow











Valerie Kamara MD Jan 29, 2018 15:47

## 2018-01-29 NOTE — HHI.HCPN
Reason for visit


   a.  To assist with evaluation and management of symptoms including: weakness

, chronic pain 


   b.  To assist medical decision maker(s) with: better understanding of 

current medical conditions; weighing benefits/burdens of medical treatment 

options; making        


        medical treatment decisions.





Subjective/Interval History


Pt seen today to follow up on comfort, goals. 





Stable over the weekend. S/p gen surgery consult RE possible infected 

peritoneal dialysis catheter, GS to follow cultures, possible may remove.  

Culture + candida, pt changed from micafungin to fluconazole per ID. Pt now on 

HD, nephrology following. Requiring some Levophed for hypotension, nursing 

titrating up to 8mcg during hemodialysis. H&H stable. CXR 1/27= Patchy 

consolidation or atelectasis of the left lower lung.





[dual visit w Delicia BOTELLO]





Pt seen in room while HD nurse present, pt undergoing HD currently. He is alert

, mostly oriented, Does not remember me from last week. Indicates feeling 

better overall (than last week). Some insight into hospital course-- some 

insight to dialysis situation, GI situation/EGD findings. Explore with him my 

conversation wife last week regarding conditions, prognosis, and that he is 

likely to continue to experience chronic medical problems and continued 

debility though with aggressive medical treatments he might remain stable for 

some time.  Not clear at this time if or when he would be able to resume 

peritoneal dialysis.  He indicates he is okay with things as they are as he is 

physically feeling much better.  He endorses aggressive goal short of 

resuscitation. Further breifly explore with him that if/when he no longer 

desires aggressive/invasive interventions he has the option for comfort measures

, no further aggressive tx. ROS essentially negative he denies any GI 

complaints.  Endorses fair appetite though he is only receiving clear liquids 

at this time.  Indicates feeling significant weakness when they attempted to 

stand him up at the bedside and get to a chair yesterday.  Patient complains of 

very mild pain to legs/feet, only when "roughly touched". Denies SOB, endorses 

mild infrequent cough, nonproductive. Requests I call his wife to update.   

Call to wife. 





.


Family/friend interactions





call to wife per pt request. provided update, all questions answered. She has 

questions RE possible discharge home, explore w her possible may require rehab 

again, depending on functional status. She asks about PT, OOB activity. She 

will be in later to see pt





.





Advance Directives


Living Will:  Never completed


Health Care Surrogate:  Copy in medical record


Durable Power of :  Copy in medical record


Advance Directive Specifics


Date completed:


1/21/18


Health Care Surrogate(s):


Healthcare surrogate document [1/21/18] actually names the patient as 

healthcare surrogate. durable power of  does not include healthcare 

decision making.





Objective





Vital Signs








  Date Time  Temp Pulse Resp B/P (MAP) Pulse Ox O2 Delivery O2 Flow Rate FiO2


 


1/29/18 10:03  85  83/47    


 


1/29/18 10:00  95      


 


1/29/18 09:30  93      


 


1/29/18 09:30  93 13 95/54 (68) 94   


 


1/29/18 09:15  74      


 


1/29/18 09:15  74 12 98/55 (69) 96   


 


1/29/18 09:00  79      


 


1/29/18 09:00  79 14 109/51 (70) 97   


 


1/29/18 08:45  74      


 


1/29/18 08:45  74 19 96/52 (67) 96   


 


1/29/18 08:32     95   21


 


1/29/18 08:30  81 12 93/63 (73) 97   


 


1/29/18 08:30  81      


 


1/29/18 08:07  87  84/51    


 


1/29/18 08:00 98.2 84 21 84/51 (62) 98   


 


1/29/18 08:00  84      


 


1/29/18 06:00  76      


 


1/29/18 04:00 97.3 86 23 116/65 (82) 93   


 


1/29/18 04:00  86      


 


1/29/18 02:00  89      


 


1/29/18 00:00 97.4 76 13 85/50 (62) 95   


 


1/29/18 00:00  76      


 


1/28/18 22:00  84      


 


1/28/18 20:00 97.2 97 11 83/53 (63) 98   


 


1/28/18 20:00  97      


 


1/28/18 19:12     95 Nasal Cannula 2.00 


 


1/28/18 18:00  107      


 


1/28/18 16:00  103      


 


1/28/18 16:00 97.8 103 27 107/56 (73) 99   


 


1/28/18 14:00  104      


 


1/28/18 12:00 98.0 88 19 84/53 (63) 99   


 


1/28/18 12:00  88      


 


1/28/18 11:00  81 9 85/40 (55) 99   














Intake & Output  


 


 1/29/18 1/29/18





 07:00 19:00


 


Intake Total 240 ml 100 ml


 


Output Total  1000 ml


 


Balance 240 ml -900 ml


 


  


 


Intake Oral 240 ml 


 


IV Total  100 ml


 


Hemodialysis  1000 ml


 


# Bowel Movements 2 








Physical Exam


CONSTITUTIONAL/GENERAL: Frail. Slight temporal wasting. In no apparent 

distress. Voice is hoarse. 


TUBES/LINES/DRAINS:  . Central line rt IJ.


SKIN: No jaundice. Diffuse pallor prominent in nail beds. Scattered petechia on 

upper extremities. Chronic venous insufficiency of lower extremities. Ulcers on 

plantar aspect of left foot and right great toe.  


NECK: Trachea midline. Supple, nontender. No palpable thyroid enlargement or 

nodularity. 


CARDIOVASCULAR: Irregular rate and irregular rhythm without murmur. observe 

afib on bedside monitor. No JVD. Peripheral pulses symmetric.


RESPIRATORY/CHEST: Mild shortness of breath with conversation. Diffuse crackles 

bilaterally. Breath sounds equal bilaterally. No wheezes.   


GASTROINTESTINAL: Abdomen soft, non-tender, nondistended. No hepato-

splenomegaly. No guarding. Peritoneal drain mid abdomen covered w clean dry 

dressing 


GENITOURINARY: Without palpable bladder distension.


MUSCULOSKELETAL: Atrophy in bilateral lower extremities. Extremities without 

clubbing, cyanosis, or edema.+ chronic vascular skin changes BLE


NEUROLOGICAL: Awake and alert. Oriented x2-3. Fair insight. Some word 

searching. Motor and sensory grossly within normal limits. Follows commands. 

moves all 4 extremities. 


PSYCHIATRIC:. No obvious anxiety/depression. no apparent hallucinations or 

other psychotic thought process.





Diagnostic Tests


Laboratory





Laboratory Tests








Test


  1/26/18


16:33 1/27/18


00:39 1/27/18


03:58 1/27/18


12:34


 


Hemoglobin


  9.6 GM/DL


(13.0-17.0) 8.0 GM/DL


(13.0-17.0) 8.0 GM/DL


(13.0-17.0) 


 


 


Hematocrit


  28.8 %


(39.0-51.0) 23.8 %


(39.0-51.0) 23.7 %


(39.0-51.0) 


 


 


White Blood Count


  


  


  11.8 TH/MM3


(4.0-11.0) 


 


 


Red Blood Count


  


  


  2.45 MIL/MM3


(4.50-5.90) 


 


 


Mean Corpuscular Volume


  


  


  96.9 FL


(80.0-100.0) 


 


 


Mean Corpuscular Hemoglobin


  


  


  32.5 PG


(27.0-34.0) 


 


 


Mean Corpuscular Hemoglobin


Concent 


  


  33.6 %


(32.0-36.0) 


 


 


Red Cell Distribution Width


  


  


  19.0 %


(11.6-17.2) 


 


 


Platelet Count


  


  


  108 TH/MM3


(150-450) 


 


 


Mean Platelet Volume


  


  


  7.7 FL


(7.0-11.0) 


 


 


Neutrophils (%) (Auto)


  


  


  95.2 %


(16.0-70.0) 


 


 


Lymphocytes (%) (Auto)


  


  


  2.5 %


(9.0-44.0) 


 


 


Monocytes (%) (Auto)


  


  


  2.1 %


(0.0-8.0) 


 


 


Eosinophils (%) (Auto)


  


  


  0.1 %


(0.0-4.0) 


 


 


Basophils (%) (Auto)


  


  


  0.1 %


(0.0-2.0) 


 


 


Neutrophils # (Auto)


  


  


  11.2 TH/MM3


(1.8-7.7) 


 


 


Lymphocytes # (Auto)


  


  


  0.3 TH/MM3


(1.0-4.8) 


 


 


Monocytes # (Auto)


  


  


  0.2 TH/MM3


(0-0.9) 


 


 


Eosinophils # (Auto)


  


  


  0.0 TH/MM3


(0-0.4) 


 


 


Basophils # (Auto)


  


  


  0.0 TH/MM3


(0-0.2) 


 


 


CBC Comment   DIFF FINAL  


 


Differential Comment     


 


Blood Urea Nitrogen


  


  


  39 MG/DL


(7-18) 


 


 


Creatinine


  


  


  3.88 MG/DL


(0.60-1.30) 


 


 


Random Glucose


  


  


  148 MG/DL


() 


 


 


Total Protein


  


  


  4.6 GM/DL


(6.4-8.2) 


 


 


Albumin


  


  


  2.0 GM/DL


(3.4-5.0) 


 


 


Calcium Level


  


  


  7.7 MG/DL


(8.5-10.1) 


 


 


Phosphorus Level


  


  


  2.5 MG/DL


(2.5-4.9) 


 


 


Magnesium Level


  


  


  1.9 MG/DL


(1.5-2.5) 


 


 


Alkaline Phosphatase


  


  


  237 U/L


() 


 


 


Aspartate Amino Transf


(AST/SGOT) 


  


  13 U/L (15-37) 


  


 


 


Alanine Aminotransferase


(ALT/SGPT) 


  


  12 U/L (12-78) 


  


 


 


Total Bilirubin


  


  


  0.7 MG/DL


(0.2-1.0) 


 


 


Sodium Level


  


  


  140 MEQ/L


(136-145) 


 


 


Potassium Level


  


  


  4.2 MEQ/L


(3.5-5.1) 


 


 


Chloride Level


  


  


  103 MEQ/L


() 


 


 


Carbon Dioxide Level


  


  


  29.5 MEQ/L


(21.0-32.0) 


 


 


Anion Gap   8 MEQ/L (5-15)  


 


Estimat Glomerular Filtration


Rate 


  


  16 ML/MIN


(>89) 


 


 


Lactic Acid Level


  


  


  2.2 mmol/L


(0.4-2.0) 


 


 


Activated Partial


Thromboplast Time 


  


  


  30.9 SEC


(24.3-30.1)


 


Test


  1/28/18


05:30 1/29/18


04:20 


  


 


 


White Blood Count


  9.2 TH/MM3


(4.0-11.0) 10.4 TH/MM3


(4.0-11.0) 


  


 


 


Red Blood Count


  2.60 MIL/MM3


(4.50-5.90) 2.48 MIL/MM3


(4.50-5.90) 


  


 


 


Hemoglobin


  8.4 GM/DL


(13.0-17.0) 8.3 GM/DL


(13.0-17.0) 


  


 


 


Hematocrit


  25.6 %


(39.0-51.0) 24.5 %


(39.0-51.0) 


  


 


 


Mean Corpuscular Volume


  98.2 FL


(80.0-100.0) 98.9 FL


(80.0-100.0) 


  


 


 


Mean Corpuscular Hemoglobin


  32.3 PG


(27.0-34.0) 33.5 PG


(27.0-34.0) 


  


 


 


Mean Corpuscular Hemoglobin


Concent 32.9 %


(32.0-36.0) 33.9 %


(32.0-36.0) 


  


 


 


Red Cell Distribution Width


  19.4 %


(11.6-17.2) 18.7 %


(11.6-17.2) 


  


 


 


Platelet Count


  113 TH/MM3


(150-450) 111 TH/MM3


(150-450) 


  


 


 


Mean Platelet Volume


  8.0 FL


(7.0-11.0) 8.2 FL


(7.0-11.0) 


  


 


 


Neutrophils (%) (Auto)


  92.4 %


(16.0-70.0) 92.1 %


(16.0-70.0) 


  


 


 


Lymphocytes (%) (Auto)


  3.8 %


(9.0-44.0) 3.5 %


(9.0-44.0) 


  


 


 


Monocytes (%) (Auto)


  2.8 %


(0.0-8.0) 3.1 %


(0.0-8.0) 


  


 


 


Eosinophils (%) (Auto)


  0.9 %


(0.0-4.0) 0.8 %


(0.0-4.0) 


  


 


 


Basophils (%) (Auto)


  0.1 %


(0.0-2.0) 0.5 %


(0.0-2.0) 


  


 


 


Neutrophils # (Auto)


  8.5 TH/MM3


(1.8-7.7) 9.6 TH/MM3


(1.8-7.7) 


  


 


 


Lymphocytes # (Auto)


  0.3 TH/MM3


(1.0-4.8) 0.4 TH/MM3


(1.0-4.8) 


  


 


 


Monocytes # (Auto)


  0.3 TH/MM3


(0-0.9) 0.3 TH/MM3


(0-0.9) 


  


 


 


Eosinophils # (Auto)


  0.1 TH/MM3


(0-0.4) 0.1 TH/MM3


(0-0.4) 


  


 


 


Basophils # (Auto)


  0.0 TH/MM3


(0-0.2) 0.1 TH/MM3


(0-0.2) 


  


 


 


CBC Comment DIFF FINAL  DIFF FINAL   


 


Differential Comment      


 


Blood Urea Nitrogen


  46 MG/DL


(7-18) 51 MG/DL


(7-18) 


  


 


 


Creatinine


  4.46 MG/DL


(0.60-1.30) 5.04 MG/DL


(0.60-1.30) 


  


 


 


Random Glucose


  55 MG/DL


() 90 MG/DL


() 


  


 


 


Total Protein


  4.6 GM/DL


(6.4-8.2) 4.7 GM/DL


(6.4-8.2) 


  


 


 


Calcium Level


  7.1 MG/DL


(8.5-10.1) 6.8 MG/DL


(8.5-10.1) 


  


 


 


Sodium Level


  140 MEQ/L


(136-145) 137 MEQ/L


(136-145) 


  


 


 


Potassium Level


  4.2 MEQ/L


(3.5-5.1) 4.4 MEQ/L


(3.5-5.1) 


  


 


 


Chloride Level


  103 MEQ/L


() 100 MEQ/L


() 


  


 


 


Carbon Dioxide Level


  27.6 MEQ/L


(21.0-32.0) 27.5 MEQ/L


(21.0-32.0) 


  


 


 


Anion Gap


  9 MEQ/L (5-15) 


  10 MEQ/L


(5-15) 


  


 


 


Estimat Glomerular Filtration


Rate 13 ML/MIN


(>89) 12 ML/MIN


(>89) 


  


 


 


Protein Corrected Calcium


  8.5 MG/DL


(8.5-10.1) 8.1 MG/DL


(8.5-10.1) 


  


 


 


Albumin


  


  1.6 GM/DL


(3.4-5.0) 


  


 


 


Phosphorus Level


  


  3.6 MG/DL


(2.5-4.9) 


  


 


 


Magnesium Level


  


  2.1 MG/DL


(1.5-2.5) 


  


 


 


Alkaline Phosphatase


  


  239 U/L


() 


  


 


 


Aspartate Amino Transf


(AST/SGOT) 


  13 U/L (15-37) 


  


  


 


 


Alanine Aminotransferase


(ALT/SGPT) 


  15 U/L (12-78) 


  


  


 


 


Total Bilirubin


  


  0.5 MG/DL


(0.2-1.0) 


  


 








Result Diagram:  


1/29/18 0420                                                                   

             1/29/18 0420





Microbiology





Microbiology








 Date/Time


Source Procedure


Growth Status


 


 


 1/26/18 15:20


Fluid Peritoneal Fluid Fungal Smear - Final


NO FUNGAL ELEMENTS SEEN. Resulted


 


 1/26/18 15:20


Fluid Peritoneal Fluid Fungal Culture


Pending Resulted





 1/26/18 15:20


Fluid Peritoneal Fluid Acid Fast Stain - Final


NO ACID FAST BACILLI SEEN Resulted


 


 1/26/18 15:20


Fluid Peritoneal Fluid Mycobacterial Culture


Pending Resulted





 1/26/18 15:20


Fluid Peritoneal Fluid Gram Stain - Final Complete


 


 1/26/18 15:20 Body Fluid Culture - Final


Candida Albicans Complete








Imaging





Last Impressions








Chest X-Ray 1/27/18 0000 Signed





Impressions: 





 Service Date/Time:  Saturday, January 27, 2018 03:15 - CONCLUSION:  Patchy 





 consolidation or atelectasis of the left lower lung.     Chucky Morton MD 








Procedures


1/26/18 EGD





Assessment and Plan


Disease Oriented Problem List:  


(1) Atrial fibrillation


(2) Sacral decubitus ulcer


(3) Heel ulceration


(4) Hypotension


(5) GI bleed


(6) Anemia


(7) ESRD (end stage renal disease) on dialysis


Symptom Scale:  


(1) Weakness


Pertinent Non-Medical Issues


Psychosocial: to his wife x 6 yrs. Has 1 adult son from prior marriage, 

who is incarcerated in [place]. He communicates with him every Sunday (son 

calls him). 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 His 

mother is still living there, and a sister. Moved to [place] in his 50s for 

senior care. 


Spiritual:no particular affiliation, does not want  support


Legal:Patient has a healthcare surrogate document in his chart however this 

document actually names the patient as designated healthcare surrogate.  Per 

[place] statutes his wife would be appropriate legal proxy if patient 

incapacitated. 


1/26/18 assisted pt to complete new HCS, names his wife as HCS.


Ethical issues impacting care:


Important Contacts


spouse Yessy Meehan [phone]


.


Prognosis


This patient was admitted for hypotension, significant anemia, GI bleed.  He 

has known history of dialysis dependent end-stage renal disease.  Ongoing 

diagnostics, GI evaluation.  With ongoing aggressive treatments and 

interventions may be able to recover from current acute conditions.





.


Code Status:  Full Code


Plan





* Legal decision maker:Patient has a healthcare surrogate document in his chart 

however this document actually names the patient as designated healthcare 

surrogate.  Per [place] statutes his wife would be appropriate legal proxy if 

patient incapacitated. Pt at time of my exam is oriented, appropriate and 

appears to have fair insight to his conditions/options. Appears able to make 

his own decisions . 1/26/18 assisted pt to complete new HCS, names his wife as 

HCS.





* Pt reports multiple recent hospitalizations/ER visits at [place] [facility] , 

recommend RECORDS REQUEST from [facility] 


   


* Goals: Pt goals aggressive short of resuscitation. 


   


* CODE STATUS: DNR 


   


* SYMPTOMS:


   --weakness/debility- ongoing since Dec 25th. Limited to no ambulation since 

that time. Rec cont OT/PT to maintain/improve functional status


   --pain- pt denies pain, however pt wife reports he has chronic neuropathic 

sounding pain to BLE [place] at night, and chronic pain to bilateral chronic foot 

wounds. He has been on norco 5mg at home.  Pt hypotensive, requiring pressor, 

cautious use of opiates. Consider resuming home norco or tramadol PRN . denies 

pain today except with "rough touch " to LE. 





* Palliative care will continue to follow during hospital course as condition 

evolves, to assist patient/decision-maker with understanding of medical 

conditions, weighing benefits/burdens of treatment options, for clarification 

of goals of treatment.  Additionally will assist with any symptoms of 

palliative concern





Attestation


To help prompt me to consider important information that might be impacting 

today's encounter and assessment, information from prior notes written by 

myself or my colleagues may have been "brought forward" into today's note.  My 

signature on this note, however, is an attestation that I personally performed 

the exam, history, and/or decision-making noted today, and, unless otherwise 

indicated, the interactions with patient, family, and staff as well as the 

review of records all occurred today.  I also attest that the listed assessment 

and stated plan reflect my best clinical judgment today based on the 

combination of historical information, prior notes, and today's exam/ 

interactions.  When time spent is documented, it refers only to time spent 

today by the signer, or if indicated, combined time spent today by 

collaborating physician/nurse practitioner.











Leandra Sparks Jan 29, 2018 11:41

## 2018-01-29 NOTE — HHI.NPPN
Subjective


General Problems:  Anemia


Renal Failure:  End Stage Renal Disease


History of Present Illness


Patient is a very pleasant 67 year old male who came into the emergency room on 

01/26/18 hypotensive, lethargic, and with a hemoglobin of 6.7.  He was 

transfused 2 units of PRBC's with a recheck pending.  He was initially put on 

Levophed for blood pressure support however it is currently not running. Has a 

past medical history of End -stage renal disease with peritoneal dialysis, AFIB

, diabetes, arthritis, chronic pain, and hx of strokes.    Onset of acute 

epigastric pain and discomfort noted for 5 days ago, but worsened over the past 

24 hours.  Reported loose stools for 3 weeks.  In the emergency room patient 

did report melena, nausea, mild vomiting undigested food yesterday, but denied 

any coffee ground emesis. Patient has AV fistula and will have dialysis while 

here.  Per patient PD catheter needs to be removed secondary to infection.


Additional Remarks


Resting comfortably.  Seen during dialysis.  On low dose Levophed


 (Gellermann,Diane M. ARNP)


Additional Remarks


Patient seen in AM, during HD, not in distress, still on pressors.


 (Carlita Hoang MD)





Review of Systems


General


Constitutional:  Fatigue


General Remarks


weakness


 (Gellermann,Diane M. ARNP)





Respiratory


Respiratory Remarks


Denies SOB


 (Gellermann,Diane M. ARNP)





Cardiovascular


Cardiac Remarks


Denies CP


 (Gellermann,Diane M. ARNP)





Gastrointestinal


GI Remarks


Denies abdominal pain


 (Gellermann,Diane M. ARNP)





Objective Data


Data











 1/29/18 1/30/18





 19:00 07:00


 


Intake Total 100 ml 


 


Balance 100 ml 


 


  


 


IV Total 100 ml 











Vital Signs








  Date Time  Temp Pulse Resp B/P (MAP) Pulse Ox O2 Delivery O2 Flow Rate FiO2


 


1/29/18 09:30  93      


 


1/29/18 09:30  93 13 95/54 (68) 94   


 


1/29/18 09:15  74      


 


1/29/18 09:15  74 12 98/55 (69) 96   


 


1/29/18 09:00  79      


 


1/29/18 09:00  79 14 109/51 (70) 97   


 


1/29/18 08:45  74      


 


1/29/18 08:45  74 19 96/52 (67) 96   


 


1/29/18 08:32     95   21


 


1/29/18 08:30  81 12 93/63 (73) 97   


 


1/29/18 08:30  81      


 


1/29/18 08:07  87  84/51    


 


1/29/18 08:00 98.2 84 21 84/51 (62) 98   


 


1/29/18 08:00  84      


 


1/29/18 06:00  76      


 


1/29/18 04:00 97.3 86 23 116/65 (82) 93   


 


1/29/18 04:00  86      


 


1/29/18 02:00  89      


 


1/29/18 00:00 97.4 76 13 85/50 (62) 95   


 


1/29/18 00:00  76      


 


1/28/18 22:00  84      


 


1/28/18 20:00 97.2 97 11 83/53 (63) 98   


 


1/28/18 20:00  97      


 


1/28/18 19:12     95 Nasal Cannula 2.00 


 


1/28/18 18:00  107      


 


1/28/18 16:00  103      


 


1/28/18 16:00 97.8 103 27 107/56 (73) 99   


 


1/28/18 14:00  104      


 


1/28/18 12:00 98.0 88 19 84/53 (63) 99   


 


1/28/18 12:00  88      


 


1/28/18 11:00  81 9 85/40 (55) 99   


 


1/28/18 10:30  123 27 100/64 (76) 98   


 


1/28/18 10:00  119 23 98/58 (71) 99   


 


1/28/18 10:00  119      








 (Gellermann,Diane M. ARNP)


-:  


1/29/18 0420                                                                   

             1/29/18 0420





Imaging





Last Impressions








Chest X-Ray 1/27/18 0000 Signed





Impressions: 





 Service Date/Time:  Saturday, January 27, 2018 03:15 - CONCLUSION:  Patchy 





 consolidation or atelectasis of the left lower lung.     Chucky Morton MD 








 (Gellermann,Diane M. ARNP)





Physical Exam


General


Appearance:  Malnourished


 (Gellermann,Diane M. ARNP)





Neck


Neck Exam:  Neck Supple


 (Gellermann,Diane M. ARNP)





Pulmonary


Resp Exam:  Clear Bilaterally


 (Gellermann,Diane M. ARNP)





Cardiology


CV Exam:  Regular


 (Gellermann,Diane M. ARNP)





Gastrointestinal/Abdomen


GI Exam:  Soft


 (Gellermann,Diane M. ARNP)





Extremeties


Extremities Exam:  No Edema


 (Gellermann,Diane M. ARNP)





Assessment/Plan


Problem List:  


(1) ESRD (end stage renal disease) on dialysis


ICD Codes:  N18.6 - End stage renal disease; Z99.2 - Dependence on renal 

dialysis


Plan:  Seen during dialysis.  


Potassium and phos WNL


Protein corrected calcuim at 8.3.  Replacement added. 


Monitor fluid and electrolytes. 





(2) Peritonitis associated with peritoneal dialysis


ICD Codes:  T85.71XA - Infection and inflammatory reaction due to peritoneal 

dialysis catheter, initial encounter


Plan:  Peritoneal fluid culture positive for Candida. 


PD catheter needs to be removed. 


ID and surgery following. 





(3) Hypotension


ICD Codes:  I95.9 - Hypotension, unspecified


Status:  Acute


Plan:  Sepsis present on admission. 


Continue supportive care. 





(4) GI bleed


ICD Codes:  K92.2 - Gastrointestinal hemorrhage, unspecified


Status:  Acute


Plan:  s/p EGD: duodenal ulcer, gastritis, esophagitis. 


Received blood transfusion. Hemoglobin is 8.








(5) Anemia


ICD Codes:  D64.9 - Anemia, unspecified (Gellermann,Diane M. ARNP)


Problem List:  


(1) ESRD (end stage renal disease) on dialysis


ICD Codes:  N18.6 - End stage renal disease; Z99.2 - Dependence on renal 

dialysis


Plan:  Seen during dialysis.  


Potassium and phos WNL


Protein corrected calcium at 8.3.  Replacement added. 


Monitor fluid and electrolytes. 


HD now, will remove minimal fluid as the BP is low.


For PD Catheter removal.





(2) Peritonitis associated with peritoneal dialysis


ICD Codes:  T85.71XA - Infection and inflammatory reaction due to peritoneal 

dialysis catheter, initial encounter


Plan:  Peritoneal fluid culture positive for Candida. 


PD catheter needs to be removed. 


ID and surgery following. 





(3) Hypotension


ICD Codes:  I95.9 - Hypotension, unspecified


Status:  Acute


Plan:  Sepsis present on admission. 


Continue supportive care. 





(4) GI bleed


ICD Codes:  K92.2 - Gastrointestinal hemorrhage, unspecified


Status:  Acute


Plan:  s/p EGD: duodenal ulcer, gastritis, esophagitis. 


Received blood transfusion. Hemoglobin is 8.








(5) Anemia


ICD Codes:  D64.9 - Anemia, unspecified (Carlita Hoang MD)





Problem Qualifiers





(1) Hypotension:  


Qualified Codes:  I95.9 - Hypotension, unspecified


(2) GI bleed:  


Qualified Codes:  K92.2 - Gastrointestinal hemorrhage, unspecified


(3) Anemia:  








Gellermann,Diane M. ARNP Jan 29, 2018 10:02


Carlita Hoang MD Jan 29, 2018 16:14

## 2018-01-29 NOTE — HHI.PR
__________________________________________________





Subjective


Subjective Notes


Awake, alert. No complaints.





Objective


Vitals/I&O





Vital Signs








  Date Time  Temp Pulse Resp B/P (MAP) Pulse Ox O2 Delivery O2 Flow Rate FiO2


 


1/29/18 16:15  89      


 


1/29/18 13:30   18 108/60 (76) 99   


 


1/29/18 12:00 98.0       


 


1/29/18 08:32        21


 


1/28/18 19:12      Nasal Cannula 2.00 








Labs





Laboratory Tests








Test


  1/29/18


04:20


 


White Blood Count 10.4 


 


Red Blood Count 2.48 


 


Hemoglobin 8.3 


 


Hematocrit 24.5 


 


Mean Corpuscular Volume 98.9 


 


Mean Corpuscular Hemoglobin 33.5 


 


Mean Corpuscular Hemoglobin


Concent 33.9 


 


 


Red Cell Distribution Width 18.7 


 


Platelet Count 111 


 


Mean Platelet Volume 8.2 


 


Neutrophils (%) (Auto) 92.1 


 


Lymphocytes (%) (Auto) 3.5 


 


Monocytes (%) (Auto) 3.1 


 


Eosinophils (%) (Auto) 0.8 


 


Basophils (%) (Auto) 0.5 


 


Neutrophils # (Auto) 9.6 


 


Lymphocytes # (Auto) 0.4 


 


Monocytes # (Auto) 0.3 


 


Eosinophils # (Auto) 0.1 


 


Basophils # (Auto) 0.1 


 


CBC Comment DIFF FINAL 


 


Differential Comment  


 


Blood Urea Nitrogen 51 


 


Creatinine 5.04 


 


Random Glucose 90 


 


Total Protein 4.7 


 


Albumin 1.6 


 


Calcium Level 6.8 


 


Phosphorus Level 3.6 


 


Magnesium Level 2.1 


 


Alkaline Phosphatase 239 


 


Aspartate Amino Transf


(AST/SGOT) 13 


 


 


Alanine Aminotransferase


(ALT/SGPT) 15 


 


 


Total Bilirubin 0.5 


 


Sodium Level 137 


 


Potassium Level 4.4 


 


Chloride Level 100 


 


Carbon Dioxide Level 27.5 


 


Anion Gap 10 


 


Estimat Glomerular Filtration


Rate 12 


 


 


Protein Corrected Calcium 8.1 














 Date/Time


Source Procedure


Growth Status


 


 


 1/26/18 07:19


Blood Peripheral Aerobic Blood Culture - Preliminary


NO GROWTH IN 3 DAYS Resulted


 


 1/26/18 07:19


Blood Peripheral Anaerobic Blood Culture - Preliminary


NO GROWTH IN 3 DAYS Resulted





 1/26/18 15:20


Fluid Peritoneal Fluid Fungal Smear - Final


NO FUNGAL ELEMENTS SEEN. Resulted


 


 1/26/18 15:20 Fungal Culture - Preliminary


Yeast Species Resulted








Narrative Exam


Abdominal exam benign. No peritonitis present. PD catheter exit site 

uncomplicated.





A/P


Assessment and Plan


PD catheter in place, apparent fungal infection requiring PD catheter removal. 


Fungal culture grew Candida.


Plan Removal PD catheter in OR tomorrow 7:15 AM.


Discussed in detail with patient. He understands.











Donny Juarez MD Jan 29, 2018 16:22

## 2018-01-29 NOTE — HHI.IDPN
Subjective


Subjective


Remarks


delaeyd entry


pt is doing well


to OR tomorrow


no fever


C.alicans  in the blood


Antibiotics


fluconazole


Allergies:  


Coded Allergies:  


     morphine (Unverified  Allergy, Severe, anaphylactic, 1/25/18)





Objective


.





Vital Signs








  Date Time  Temp Pulse Resp B/P (MAP) Pulse Ox O2 Delivery O2 Flow Rate FiO2


 


1/29/18 22:05  99  99/59    


 


1/29/18 21:26     99 Nasal Cannula 2.00 


 


1/29/18 18:30  100      


 


1/29/18 18:15  107 22 96/53 (67) 98   


 


1/29/18 18:15  107      


 


1/29/18 18:00  96 16 98/55 (69) 100   


 


1/29/18 18:00  96      


 


1/29/18 17:45  84 13 97/57 (70) 100   


 


1/29/18 17:30  91 16 101/59 (73) 100   


 


1/29/18 17:15  96 18 99/59 (72) 100   


 


1/29/18 17:00  92 18 105/55 (72) 86   


 


1/29/18 16:45  105 23 98/54 (69) 97   


 


1/29/18 16:30  108 18 106/50 (68) 97   


 


1/29/18 16:15  89 16 98/52 (67) 99   


 


1/29/18 16:15  89      


 


1/29/18 16:00  89      


 


1/29/18 16:00 98.6 89 19 98/53 (68) 98   


 


1/29/18 15:45  89      


 


1/29/18 15:30  99      


 


1/29/18 15:15  97      


 


1/29/18 15:00  88      


 


1/29/18 14:45  89      


 


1/29/18 14:30  89      


 


1/29/18 14:15  100      


 


1/29/18 14:00  101      


 


1/29/18 13:30  97 18 108/60 (76) 99   


 


1/29/18 13:30  97      


 


1/29/18 13:15  105 11 104/72 (83) 96   


 


1/29/18 13:15  105      


 


1/29/18 13:00  127      


 


1/29/18 13:00  127 27 91/72 (78) 89   


 


1/29/18 12:46  123      


 


1/29/18 12:46  123 27 103/64 (77) 94   


 


1/29/18 12:30  113 26 91/65 (74) 95   


 


1/29/18 12:30  113      


 


1/29/18 12:16  116      


 


1/29/18 12:16  116 18 89/67 (74) 91   


 


1/29/18 12:00  84      


 


1/29/18 12:00 98.0 84 15 111/60 (77) 97   


 


1/29/18 10:03  85  83/47    


 


1/29/18 10:00  95      


 


1/29/18 09:30  93      


 


1/29/18 09:30  93 13 95/54 (68) 94   


 


1/29/18 09:15  74      


 


1/29/18 09:15  74 12 98/55 (69) 96   


 


1/29/18 09:00  79      


 


1/29/18 09:00  79 14 109/51 (70) 97   


 


1/29/18 08:45  74      


 


1/29/18 08:45  74 19 96/52 (67) 96   


 


1/29/18 08:32     95   21


 


1/29/18 08:30  81 12 93/63 (73) 97   


 


1/29/18 08:30  81      


 


1/29/18 08:07  87  84/51    


 


1/29/18 08:00 98.2 84 21 84/51 (62) 98   


 


1/29/18 08:00  84      


 


1/29/18 06:00  76      


 


1/29/18 04:00 97.3 86 23 116/65 (82) 93   


 


1/29/18 04:00  86      


 


1/29/18 02:00  89      


 


1/29/18 00:00 97.4 76 13 85/50 (62) 95   


 


1/29/18 00:00  76      














 1/29/18 1/29/18 1/30/18





 15:00 23:00 07:00


 


Intake Total 100 ml 1640 ml 


 


Output Total 600 ml 600 ml 


 


Balance -500 ml 1040 ml 


 


   


 


Intake Oral  1440 ml 


 


IV Total 100 ml 200 ml 


 


Hemodialysis 600 ml 600 ml 


 


# Bowel Movements  0 








.





Laboratory Tests








Test


  1/28/18


05:30 1/29/18


04:20


 


White Blood Count 9.2 TH/MM3  10.4 TH/MM3 


 


Red Blood Count 2.60 MIL/MM3  2.48 MIL/MM3 


 


Hemoglobin 8.4 GM/DL  8.3 GM/DL 


 


Hematocrit 25.6 %  24.5 % 


 


Mean Corpuscular Volume 98.2 FL  98.9 FL 


 


Mean Corpuscular Hemoglobin 32.3 PG  33.5 PG 


 


Mean Corpuscular Hemoglobin


Concent 32.9 % 


  33.9 % 


 


 


Red Cell Distribution Width 19.4 %  18.7 % 


 


Platelet Count 113 TH/MM3  111 TH/MM3 


 


Mean Platelet Volume 8.0 FL  8.2 FL 


 


Neutrophils (%) (Auto) 92.4 %  92.1 % 


 


Lymphocytes (%) (Auto) 3.8 %  3.5 % 


 


Monocytes (%) (Auto) 2.8 %  3.1 % 


 


Eosinophils (%) (Auto) 0.9 %  0.8 % 


 


Basophils (%) (Auto) 0.1 %  0.5 % 


 


Neutrophils # (Auto) 8.5 TH/MM3  9.6 TH/MM3 


 


Lymphocytes # (Auto) 0.3 TH/MM3  0.4 TH/MM3 


 


Monocytes # (Auto) 0.3 TH/MM3  0.3 TH/MM3 


 


Eosinophils # (Auto) 0.1 TH/MM3  0.1 TH/MM3 


 


Basophils # (Auto) 0.0 TH/MM3  0.1 TH/MM3 


 


CBC Comment DIFF FINAL  DIFF FINAL 


 


Differential Comment    








Laboratory Tests








Test


  1/28/18


05:30 1/29/18


04:20


 


Blood Urea Nitrogen 46 MG/DL  51 MG/DL 


 


Creatinine 4.46 MG/DL  5.04 MG/DL 


 


Random Glucose 55 MG/DL  90 MG/DL 


 


Total Protein 4.6 GM/DL  4.7 GM/DL 


 


Calcium Level 7.1 MG/DL  6.8 MG/DL 


 


Sodium Level 140 MEQ/L  137 MEQ/L 


 


Potassium Level 4.2 MEQ/L  4.4 MEQ/L 


 


Chloride Level 103 MEQ/L  100 MEQ/L 


 


Carbon Dioxide Level 27.6 MEQ/L  27.5 MEQ/L 


 


Anion Gap 9 MEQ/L  10 MEQ/L 


 


Estimat Glomerular Filtration


Rate 13 ML/MIN 


  12 ML/MIN 


 


 


Protein Corrected Calcium 8.5 MG/DL  8.1 MG/DL 


 


Albumin  1.6 GM/DL 


 


Phosphorus Level  3.6 MG/DL 


 


Magnesium Level  2.1 MG/DL 


 


Alkaline Phosphatase  239 U/L 


 


Aspartate Amino Transf


(AST/SGOT) 


  13 U/L 


 


 


Alanine Aminotransferase


(ALT/SGPT) 


  15 U/L 


 


 


Total Bilirubin  0.5 MG/DL 








Imaging


 


Last Impressions








Chest X-Ray 1/27/18 0000 Signed





Impressions: 





 Service Date/Time:  Saturday, January 27, 2018 03:15 - CONCLUSION:  Patchy 





 consolidation or atelectasis of the left lower lung.     Chucky Morton MD 








Physical Exam


CONSTITUTIONAL/GENERAL: This is an adequately nourished patient, in no apparent 

distress. Chronically ill apearing


TUBES/LINES/DRAINS:


AV fistula in place LUE with excellent thrill





SKIN: No jaundice, rashes, or lesions. Ecchymoses on upper extremities. No 

wounds seen anteriorly. Skin temperature appropriate. Not diaphoretic. 


  CARDIOVASCULAR: Regular rate and rhythm without murmurs, gallops, or rubs. No 

JVD. Peripheral pulses symmetric.


RESPIRATORY/CHEST: Symmetric, unlabored respirations. Clear to auscultation. 

Breath sounds equal bilaterally. No wheezes, rales, or rhonchi.  


GASTROINTESTINAL: Abdomen soft, non-tender, nondistended. No hepato-splenomegaly

, or palpable masses. No guarding. Bowel sounds present.


Imncontinent of liquid stool


PD cath in place 


NO peritoneal signs


 MUSCULOSKELETAL: Extremities without clubbing, cyanosis, or edema. No joint 

tenderness or effusion noted. No calf tenderness. No mottling or clubbing.


 NEUROLOGICAL: Awake and alert. Motor and sensory grossly within normal limits. 

Follows commands. confused. Moves all extremities.


PSYCHIATRIC: No obvious anxiety/depression. no apparent hallucinations or other 

psychotic thought process.








Assessment & Plan


Remarks


Assessment and Plan


Assessment and Plan


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


   - again growin C albicans


case was dw Dr Hoang: he ervin me that clx grw out C.albicans


Sepis suspeced on presentation


sp GI bleed 





   - catheter  to be removed tomorros


   fu new PD fluid cl;x


    cont  flucoanzol, can be switched to PO


    cont for 2 weeks post op if  blood clx remain negative

















Valerie Kamara MD Jan 29, 2018 23:51

## 2018-01-29 NOTE — HHI.CCPN
Subjective


Remarks/Hospital Course


67 year-old male with a medical history significant for end-stage renal disease 

on peritoneal dialysis who was noted to be severely hypotensive and lethargic 

at the dialysis center on 1/25 and 911 was called and patient was transferred 

to [facility] ER.  On arrival he was noted to be hypotensive with SBP 70s 

and heart rate 100s.  He also reportedly had significant melena and rectal 

bleeding following arrival.  A right IJ central line was placed by ER 

physician.  Patient's wife arrived and wanted to make patient DNR status and 

did not want blood transfusions and wished transferring to hospice however 

subsequently patient woke up and wanted to continue aggressive care including 

blood transfusions.  ER physician contacted me questioning patient's wife's 

ability to make decisions for him due to concern for secondary gain as well as 

possible psychiatric illness.  Patient was accepted for admission by critical 

care medicine service.  4 units PRBCs were ordered to be transfused stat by ER 

physician.  When I arrived to see the patient he was laying in the ER stretcher 

on nasal cannula on 5 mics of Levophed.  He is drowsy but easily arousable.  He 

knew he was at the hospital.


He could not give me details of his history.  He did tell me that Dr. Hoang is 

his nephrologist.





1/27 Patient is on Levophed 2 mics, s/p HD yesterday. Awake and alert on 

Protonix drip. s/p EGD yesterday showed 2 large Duodenal ulcer, esophagitis and 

gastritis.


1/28:  Afebrile.  Currently off norepinephrine but MAP is around 60 currently.  

States he is okay with removing peritoneal dialysis catheter.  Will discuss 

with general surgery in nephrology in a.m.





Subjective





1/29:  Blood pressures are borderline.  Awake and alert and eating popsicles 

currently.  Patient's okay with removing peroneal dialysis catheter.  Dr. Koch as yet to see patient today.  Agree with ID and nephrology's 

recommendations for removal.





Objective





Vital Signs








  Date Time  Temp Pulse Resp B/P (MAP) Pulse Ox O2 Delivery O2 Flow Rate FiO2


 


1/29/18 10:03  85  83/47    


 


1/29/18 09:30   13  94   


 


1/29/18 08:32        21


 


1/29/18 08:00 98.2       


 


1/28/18 19:12      Nasal Cannula 2.00 














Intake and Output   


 


 1/29/18 1/29/18 1/30/18





 08:00 16:00 00:00


 


Intake Total 240 ml 100 ml 


 


Output Total  600 ml 


 


Balance 240 ml -500 ml 








Result Diagram:  


1/29/18 0420                                                                   

             1/29/18 0420





Other Results








Microbiology








 Date/Time


Source Procedure


Growth Status


 


 


 1/26/18 07:19


Blood Peripheral Aerobic Blood Culture - Preliminary


NO GROWTH IN 3 DAYS Resulted


 


 1/26/18 07:19


Blood Peripheral Anaerobic Blood Culture - Preliminary


NO GROWTH IN 3 DAYS Resulted





 1/26/18 15:20


Fluid Peritoneal Fluid Fungal Smear - Final


NO FUNGAL ELEMENTS SEEN. Resulted


 


 1/26/18 15:20 Fungal Culture - Preliminary


Yeast Species Resulted








Imaging





Last Impressions








Chest X-Ray 1/27/18 0000 Signed





Impressions: 





 Service Date/Time:  Saturday, January 27, 2018 03:15 - CONCLUSION:  Patchy 





 consolidation or atelectasis of the left lower lung.     Chucky Morton MD 








Objective Remarks


GENERAL: 67 year  male resting in bed in no acute distress


SKIN: Woman dry.  No rash


HEAD: Atraumatic. Normocephalic. 


EYES:  No scleral icterus. No injection or drainage. 


ENT: No nasal bleeding or discharge.  Mucous membranes pink and dry..


NECK: Trachea midline.  Supple.


CARDIOVASCULAR: Heart rate in the 80s.  Irregularly irregular consistent with 

A. fib.


RESPIRATORY: No accessory muscle use. Clear to auscultation. Breath sounds 

equal bilaterally.  Decreased respiratory effort.


GASTROINTESTINAL: Abdomen soft, nondistended.  There is a PD-Cath in place.  No 

signs erythema or drainage.


MUSCULOSKELETAL: Patient has a well-healed rt heel ulcer.  Venous stasis 

changes noted to his bilateral lower extremities.


NEUROLOGICAL: Awake and weak and mildly confused. No obvious cranial nerve 

deficits.  Moves both upper extremity is


BACK: Stage I/early to decubitus in his sacral area.


Vascular Central Line Catheter:  Yes


Assessment to:  Continue


Line:  Central Venous Catheter


Side:  Right


Location:  Internal, Jugular





A/P


Assessment and Plan


Neuro/Psych: 





Depression


Chronic Pain syndrome


History of CVA 1986/2007





Holding paroxetine 10 mg daily/home medication.  Resume when clinically 

indicated


Avoid sedatives and narcotics including tramadol/home medication, monitor neuro 

status. Awake and alert


Holding clopidogrel 75 mg daily.  Resume when okay with GI





CV: 





Atrial fibrillation rate controlled


Straight hypertension





monitor HR and BP keep MAP>65mmHg


       Lactic acid 2.2


Holding clopidogrel 75 mg daily light of duodenal ulcer


Holding lisinopril 20 mill grams daily and metoprolol 50 mill grams daily in 

light of hypotension


Holding isosorbide mononitrate 30 mg daily light of borderline blood pressures


As needed norepinephrine to maintain mean arterial pressure gradient 65





Pulm: 





Continue with oxygen keep sat >92%.  


Bronchodilators when necessary.





GI/liver: 





Upper GI bleed secondary to duodenal ulcer





On clear liquid diet, advance per surgery/GI


On pantoprazole 40 mg by mouth daily


       s/p EGD 1/26 which showed 2 large duodenal ulcers, grade D esophagitis, 

gastritis.





Renal/: 





Monitor renal function, I/O's, avoid nephrotoxins.


                nephrology Dr. Hoang. Surgery is following- Dr. Juarez fungal 

infection requiring PD catheter removal. 





Heme:





Normocytic anemia


Thrombocytopenia





Monitor CBC, s/p transfusion 2units PRBC 





Endocrine: 





Diabetes mellitus





SSI for glycemic control with low Novulog before meals at bedtime


Holding Tradjenta 5 mg daily





ID: 





Infected peritoneal dialysis catheter/peritonitis fungal





Continue /fluconazole 200 mg IV daily) monitor for signs of infections ( Fever, 

WBC) follow up on cultures.  Peritoneal fluid 1/26 positive for yeast/Candida


     ID is following.





All cultures from this hospital admission no growth to date.


Patient wishes to discuss with nephrology and general surgery in a.m.  Wishes 

to have dialysis catheter removed at this time.





MSK:  





Right heel ulcer


Sacral decubitus ulcer





Wound care evaluate and treat





Prophylaxis: PPI/SCDs.





Lines: Right IJ CVP placed 1/25





Level II follow-up











Chase Steen MD Jan 29, 2018 12:47

## 2018-01-30 VITALS — HEART RATE: 135 BPM

## 2018-01-30 VITALS — SYSTOLIC BLOOD PRESSURE: 91 MMHG | DIASTOLIC BLOOD PRESSURE: 61 MMHG | HEART RATE: 124 BPM | RESPIRATION RATE: 13 BRPM

## 2018-01-30 VITALS — HEART RATE: 97 BPM

## 2018-01-30 VITALS — OXYGEN SATURATION: 97 % | HEART RATE: 125 BPM | RESPIRATION RATE: 17 BRPM

## 2018-01-30 VITALS — RESPIRATION RATE: 13 BRPM | HEART RATE: 126 BPM | SYSTOLIC BLOOD PRESSURE: 97 MMHG | DIASTOLIC BLOOD PRESSURE: 58 MMHG

## 2018-01-30 VITALS — HEART RATE: 119 BPM

## 2018-01-30 VITALS — RESPIRATION RATE: 14 BRPM | HEART RATE: 118 BPM | SYSTOLIC BLOOD PRESSURE: 96 MMHG | DIASTOLIC BLOOD PRESSURE: 51 MMHG

## 2018-01-30 VITALS — HEART RATE: 152 BPM

## 2018-01-30 VITALS — OXYGEN SATURATION: 98 % | HEART RATE: 139 BPM | TEMPERATURE: 98.1 F | RESPIRATION RATE: 24 BRPM

## 2018-01-30 VITALS — HEART RATE: 123 BPM | DIASTOLIC BLOOD PRESSURE: 59 MMHG | RESPIRATION RATE: 13 BRPM | SYSTOLIC BLOOD PRESSURE: 92 MMHG

## 2018-01-30 VITALS — HEART RATE: 122 BPM

## 2018-01-30 VITALS — RESPIRATION RATE: 13 BRPM | HEART RATE: 103 BPM | OXYGEN SATURATION: 99 %

## 2018-01-30 VITALS — HEART RATE: 120 BPM

## 2018-01-30 VITALS — HEART RATE: 129 BPM | RESPIRATION RATE: 17 BRPM | SYSTOLIC BLOOD PRESSURE: 99 MMHG | DIASTOLIC BLOOD PRESSURE: 57 MMHG

## 2018-01-30 VITALS — SYSTOLIC BLOOD PRESSURE: 93 MMHG | DIASTOLIC BLOOD PRESSURE: 53 MMHG | HEART RATE: 130 BPM | RESPIRATION RATE: 12 BRPM

## 2018-01-30 VITALS
DIASTOLIC BLOOD PRESSURE: 58 MMHG | HEART RATE: 117 BPM | SYSTOLIC BLOOD PRESSURE: 99 MMHG | RESPIRATION RATE: 11 BRPM | OXYGEN SATURATION: 100 %

## 2018-01-30 VITALS — HEART RATE: 114 BPM

## 2018-01-30 VITALS — HEART RATE: 118 BPM

## 2018-01-30 VITALS — DIASTOLIC BLOOD PRESSURE: 55 MMHG | RESPIRATION RATE: 11 BRPM | HEART RATE: 114 BPM | SYSTOLIC BLOOD PRESSURE: 101 MMHG

## 2018-01-30 VITALS — HEART RATE: 123 BPM

## 2018-01-30 VITALS — HEART RATE: 117 BPM

## 2018-01-30 VITALS
DIASTOLIC BLOOD PRESSURE: 55 MMHG | SYSTOLIC BLOOD PRESSURE: 106 MMHG | HEART RATE: 123 BPM | RESPIRATION RATE: 12 BRPM | TEMPERATURE: 98.9 F | OXYGEN SATURATION: 99 %

## 2018-01-30 VITALS
DIASTOLIC BLOOD PRESSURE: 54 MMHG | RESPIRATION RATE: 11 BRPM | OXYGEN SATURATION: 100 % | HEART RATE: 121 BPM | SYSTOLIC BLOOD PRESSURE: 92 MMHG

## 2018-01-30 VITALS — HEART RATE: 113 BPM

## 2018-01-30 VITALS — SYSTOLIC BLOOD PRESSURE: 93 MMHG | RESPIRATION RATE: 12 BRPM | DIASTOLIC BLOOD PRESSURE: 55 MMHG | HEART RATE: 140 BPM

## 2018-01-30 VITALS — HEART RATE: 112 BPM

## 2018-01-30 VITALS
OXYGEN SATURATION: 70 % | DIASTOLIC BLOOD PRESSURE: 60 MMHG | RESPIRATION RATE: 12 BRPM | HEART RATE: 126 BPM | SYSTOLIC BLOOD PRESSURE: 102 MMHG

## 2018-01-30 VITALS — SYSTOLIC BLOOD PRESSURE: 91 MMHG | HEART RATE: 135 BPM | RESPIRATION RATE: 15 BRPM | DIASTOLIC BLOOD PRESSURE: 61 MMHG

## 2018-01-30 VITALS — RESPIRATION RATE: 12 BRPM | DIASTOLIC BLOOD PRESSURE: 51 MMHG | HEART RATE: 125 BPM | SYSTOLIC BLOOD PRESSURE: 92 MMHG

## 2018-01-30 VITALS — OXYGEN SATURATION: 100 %

## 2018-01-30 VITALS — HEART RATE: 121 BPM

## 2018-01-30 VITALS
DIASTOLIC BLOOD PRESSURE: 57 MMHG | TEMPERATURE: 97.5 F | SYSTOLIC BLOOD PRESSURE: 102 MMHG | RESPIRATION RATE: 20 BRPM | HEART RATE: 90 BPM | OXYGEN SATURATION: 97 %

## 2018-01-30 VITALS — DIASTOLIC BLOOD PRESSURE: 52 MMHG | RESPIRATION RATE: 21 BRPM | HEART RATE: 127 BPM | SYSTOLIC BLOOD PRESSURE: 78 MMHG

## 2018-01-30 VITALS
DIASTOLIC BLOOD PRESSURE: 66 MMHG | RESPIRATION RATE: 20 BRPM | SYSTOLIC BLOOD PRESSURE: 90 MMHG | HEART RATE: 117 BPM | TEMPERATURE: 97.7 F | OXYGEN SATURATION: 97 %

## 2018-01-30 VITALS — HEART RATE: 118 BPM | DIASTOLIC BLOOD PRESSURE: 52 MMHG | SYSTOLIC BLOOD PRESSURE: 93 MMHG | RESPIRATION RATE: 12 BRPM

## 2018-01-30 VITALS — HEART RATE: 115 BPM

## 2018-01-30 VITALS — HEART RATE: 125 BPM | SYSTOLIC BLOOD PRESSURE: 94 MMHG | DIASTOLIC BLOOD PRESSURE: 59 MMHG | RESPIRATION RATE: 14 BRPM

## 2018-01-30 VITALS — DIASTOLIC BLOOD PRESSURE: 68 MMHG | SYSTOLIC BLOOD PRESSURE: 101 MMHG | HEART RATE: 138 BPM | RESPIRATION RATE: 13 BRPM

## 2018-01-30 VITALS — HEART RATE: 136 BPM | SYSTOLIC BLOOD PRESSURE: 93 MMHG | DIASTOLIC BLOOD PRESSURE: 57 MMHG | RESPIRATION RATE: 20 BRPM

## 2018-01-30 VITALS — HEART RATE: 140 BPM

## 2018-01-30 VITALS — DIASTOLIC BLOOD PRESSURE: 57 MMHG | HEART RATE: 122 BPM | RESPIRATION RATE: 16 BRPM | SYSTOLIC BLOOD PRESSURE: 93 MMHG

## 2018-01-30 VITALS — HEART RATE: 134 BPM

## 2018-01-30 VITALS
HEART RATE: 119 BPM | DIASTOLIC BLOOD PRESSURE: 61 MMHG | OXYGEN SATURATION: 96 % | RESPIRATION RATE: 14 BRPM | SYSTOLIC BLOOD PRESSURE: 92 MMHG

## 2018-01-30 VITALS — HEART RATE: 101 BPM

## 2018-01-30 VITALS — HEART RATE: 110 BPM

## 2018-01-30 VITALS — HEART RATE: 103 BPM

## 2018-01-30 VITALS — HEART RATE: 124 BPM

## 2018-01-30 VITALS — HEART RATE: 126 BPM

## 2018-01-30 VITALS — OXYGEN SATURATION: 95 %

## 2018-01-30 VITALS — HEART RATE: 92 BPM

## 2018-01-30 LAB
ALBUMIN SERPL-MCNC: 2 GM/DL (ref 3.4–5)
ALP SERPL-CCNC: 245 U/L (ref 45–117)
ALT SERPL-CCNC: 12 U/L (ref 12–78)
AST SERPL-CCNC: 12 U/L (ref 15–37)
BASO STIPL BLD QL SMEAR: (no result)
BASOPHILS # BLD AUTO: 0 TH/MM3 (ref 0–0.2)
BASOPHILS NFR BLD: 0.4 % (ref 0–2)
BILIRUB SERPL-MCNC: 0.6 MG/DL (ref 0.2–1)
BUN SERPL-MCNC: 32 MG/DL (ref 7–18)
CALCIUM SERPL-MCNC: 7 MG/DL (ref 8.5–10.1)
CALCIUM TP COR SERPL-MCNC: 8.1 MG/DL (ref 8.5–10.1)
CHLORIDE SERPL-SCNC: 99 MEQ/L (ref 98–107)
CREAT SERPL-MCNC: 3.77 MG/DL (ref 0.6–1.3)
EOSINOPHIL # BLD: 0.1 TH/MM3 (ref 0–0.4)
EOSINOPHIL NFR BLD: 0.6 % (ref 0–4)
ERYTHROCYTE [DISTWIDTH] IN BLOOD BY AUTOMATED COUNT: 19.3 % (ref 11.6–17.2)
GFR SERPLBLD BASED ON 1.73 SQ M-ARVRAT: 16 ML/MIN (ref 89–?)
GLUCOSE SERPL-MCNC: 70 MG/DL (ref 74–106)
HCO3 BLD-SCNC: 30.7 MEQ/L (ref 21–32)
HCT VFR BLD CALC: 25.3 % (ref 39–51)
HGB BLD-MCNC: 8.6 GM/DL (ref 13–17)
LYMPHOCYTES # BLD AUTO: 0.4 TH/MM3 (ref 1–4.8)
LYMPHOCYTES NFR BLD AUTO: 4.4 % (ref 9–44)
MAGNESIUM SERPL-MCNC: 1.9 MG/DL (ref 1.5–2.5)
MCH RBC QN AUTO: 33.5 PG (ref 27–34)
MCHC RBC AUTO-ENTMCNC: 33.8 % (ref 32–36)
MCV RBC AUTO: 99 FL (ref 80–100)
MONOCYTE #: 0.3 TH/MM3 (ref 0–0.9)
MONOCYTES NFR BLD: 3.6 % (ref 0–8)
NEUTROPHILS # BLD AUTO: 8.2 TH/MM3 (ref 1.8–7.7)
NEUTROPHILS NFR BLD AUTO: 91 % (ref 16–70)
PHOSPHATE SERPL-MCNC: 2.9 MG/DL (ref 2.5–4.9)
PLATELET # BLD: 118 TH/MM3 (ref 150–450)
PMV BLD AUTO: 7.9 FL (ref 7–11)
PROT SERPL-MCNC: 5 GM/DL (ref 6.4–8.2)
RBC # BLD AUTO: 2.55 MIL/MM3 (ref 4.5–5.9)
SODIUM SERPL-SCNC: 137 MEQ/L (ref 136–145)
WBC # BLD AUTO: 9 TH/MM3 (ref 4–11)

## 2018-01-30 PROCEDURE — 0WPG03Z REMOVAL OF INFUSION DEVICE FROM PERITONEAL CAVITY, OPEN APPROACH: ICD-10-PCS | Performed by: SURGERY

## 2018-01-30 RX ADMIN — HUMAN INSULIN SCH: 100 INJECTION, SOLUTION SUBCUTANEOUS at 12:00

## 2018-01-30 RX ADMIN — STANDARDIZED SENNA CONCENTRATE AND DOCUSATE SODIUM SCH TAB: 8.6; 5 TABLET, FILM COATED ORAL at 22:42

## 2018-01-30 RX ADMIN — HUMAN INSULIN SCH: 100 INJECTION, SOLUTION SUBCUTANEOUS at 21:00

## 2018-01-30 RX ADMIN — CALCIUM CARBONATE (ANTACID) CHEW TAB 500 MG SCH MG: 500 CHEW TAB at 09:41

## 2018-01-30 RX ADMIN — PANTOPRAZOLE SCH MG: 40 TABLET, DELAYED RELEASE ORAL at 09:41

## 2018-01-30 RX ADMIN — STANDARDIZED SENNA CONCENTRATE AND DOCUSATE SODIUM SCH TAB: 8.6; 5 TABLET, FILM COATED ORAL at 09:41

## 2018-01-30 RX ADMIN — HUMAN INSULIN SCH: 100 INJECTION, SOLUTION SUBCUTANEOUS at 17:00

## 2018-01-30 RX ADMIN — HUMAN INSULIN SCH: 100 INJECTION, SOLUTION SUBCUTANEOUS at 08:00

## 2018-01-30 RX ADMIN — Medication SCH ML: at 22:42

## 2018-01-30 RX ADMIN — CALCIUM CARBONATE (ANTACID) CHEW TAB 500 MG SCH MG: 500 CHEW TAB at 22:42

## 2018-01-30 RX ADMIN — FLUCONAZOLE SCH MLS/HR: 2 INJECTION, SOLUTION INTRAVENOUS at 18:41

## 2018-01-30 RX ADMIN — Medication SCH ML: at 09:00

## 2018-01-30 NOTE — HHI.CCPN
Subjective


Remarks/Hospital Course


67 year-old male with a medical history significant for end-stage renal disease 

on peritoneal dialysis who was noted to be severely hypotensive and lethargic 

at the dialysis center on 1/25 and 911 was called and patient was transferred 

to [facility] ER.  On arrival he was noted to be hypotensive with SBP 70s 

and heart rate 100s.  He also reportedly had significant melena and rectal 

bleeding following arrival.  A right IJ central line was placed by ER 

physician.  Patient's wife arrived and wanted to make patient DNR status and 

did not want blood transfusions and wished transferring to hospice however 

subsequently patient woke up and wanted to continue aggressive care including 

blood transfusions.  ER physician contacted me questioning patient's wife's 

ability to make decisions for him due to concern for secondary gain as well as 

possible psychiatric illness.  Patient was accepted for admission by critical 

care medicine service.  4 units PRBCs were ordered to be transfused stat by ER 

physician.  When I arrived to see the patient he was laying in the ER stretcher 

on nasal cannula on 5 mics of Levophed.  He is drowsy but easily arousable.  He 

knew he was at the hospital.


He could not give me details of his history.  He did tell me that Dr. Hoang is 

his nephrologist.





1/27 Patient is on Levophed 2 mics, s/p HD yesterday. Awake and alert on 

Protonix drip. s/p EGD yesterday showed 2 large Duodenal ulcer, esophagitis and 

gastritis.


1/28:  Afebrile.  Currently off norepinephrine but MAP is around 60 currently.  

States he is okay with removing peritoneal dialysis catheter.  Will discuss 

with general surgery in nephrology in a.m.





Subjective





1/29:  Blood pressures are borderline.  Awake and alert and eating popsicles 

currently.  Patient's okay with removing peroneal dialysis catheter.  Dr. Koch as yet to see patient today.  Agree with ID and nephrology's 

recommendations for removal.


1/30 Patient s/p PD catheter removal this morning. On Levophed 2 mics. Afebrile.





Objective





Vital Signs








  Date Time  Temp Pulse Resp B/P (MAP) Pulse Ox O2 Delivery O2 Flow Rate FiO2


 


1/30/18 09:53     100 Nasal Cannula 2.00 


 


1/30/18 09:00 98.0 107 17 99/42 (61)    


 


1/29/18 08:32        21














Intake and Output   


 


 1/30/18 1/30/18 1/31/18





 08:00 16:00 00:00


 


Intake Total 100 ml  


 


Output Total 0 ml  


 


Balance 100 ml  








Result Diagram:  


1/30/18 0505                                                                   

             1/30/18 0505





Other Results





Laboratory Tests








Test


  1/30/18


05:05


 


White Blood Count 9.0 TH/MM3 


 


Red Blood Count 2.55 MIL/MM3 


 


Hemoglobin 8.6 GM/DL 


 


Hematocrit 25.3 % 


 


Mean Corpuscular Volume 99.0 FL 


 


Mean Corpuscular Hemoglobin 33.5 PG 


 


Mean Corpuscular Hemoglobin


Concent 33.8 % 


 


 


Red Cell Distribution Width 19.3 % 


 


Platelet Count 118 TH/MM3 


 


Mean Platelet Volume 7.9 FL 


 


Neutrophils (%) (Auto) 91.0 % 


 


Lymphocytes (%) (Auto) 4.4 % 


 


Monocytes (%) (Auto) 3.6 % 


 


Eosinophils (%) (Auto) 0.6 % 


 


Basophils (%) (Auto) 0.4 % 


 


Neutrophils # (Auto) 8.2 TH/MM3 


 


Lymphocytes # (Auto) 0.4 TH/MM3 


 


Monocytes # (Auto) 0.3 TH/MM3 


 


Eosinophils # (Auto) 0.1 TH/MM3 


 


Basophils # (Auto) 0.0 TH/MM3 


 


CBC Comment AUTO DIFF 


 


Differential Comment


  AUTO DIFF


CONFIRMED


 


Platelet Estimate LOW 


 


Platelet Morphology Comment NORMAL 


 


Basophilic Stippling FAINT 


 


Blood Urea Nitrogen 32 MG/DL 


 


Creatinine 3.77 MG/DL 


 


Random Glucose 70 MG/DL 


 


Total Protein 5.0 GM/DL 


 


Albumin 2.0 GM/DL 


 


Calcium Level 7.0 MG/DL 


 


Phosphorus Level 2.9 MG/DL 


 


Magnesium Level 1.9 MG/DL 


 


Alkaline Phosphatase 245 U/L 


 


Aspartate Amino Transf


(AST/SGOT) 12 U/L 


 


 


Alanine Aminotransferase


(ALT/SGPT) 12 U/L 


 


 


Total Bilirubin 0.6 MG/DL 


 


Sodium Level 137 MEQ/L 


 


Potassium Level 4.0 MEQ/L 


 


Chloride Level 99 MEQ/L 


 


Carbon Dioxide Level 30.7 MEQ/L 


 


Anion Gap 7 MEQ/L 


 


Estimat Glomerular Filtration


Rate 16 ML/MIN 


 


 


Protein Corrected Calcium 8.1 MG/DL 








Imaging





Last Impressions








Chest X-Ray 1/27/18 0000 Signed





Impressions: 





 Service Date/Time:  Saturday, January 27, 2018 03:15 - CONCLUSION:  Patchy 





 consolidation or atelectasis of the left lower lung.     Chucky Morton MD 








Objective Remarks


GENERAL: 67 year  male resting in bed in no acute distress


SKIN: Woman dry.  No rash


HEAD: Atraumatic. Normocephalic. 


EYES:  No scleral icterus. No injection or drainage. 


ENT: No nasal bleeding or discharge.  Mucous membranes pink and dry..


NECK: Trachea midline.  Supple.


CARDIOVASCULAR: Heart rate in the 80s.  Irregularly irregular consistent with 

A. fib.


RESPIRATORY: No accessory muscle use. Clear to auscultation. Breath sounds 

equal bilaterally.  Decreased respiratory effort.


GASTROINTESTINAL: Abdomen soft, nondistended.  There is a PD-Cath in place.  No 

signs erythema or drainage.


MUSCULOSKELETAL: Patient has a well-healed rt heel ulcer.  Venous stasis 

changes noted to his bilateral lower extremities.


NEUROLOGICAL: Awake and weak and mildly confused. No obvious cranial nerve 

deficits.  Moves both upper extremity is


BACK: Stage I/early to decubitus in his sacral area.


Line:  Central Venous Catheter


Side:  Right


Location:  Internal, Jugular





A/P


Assessment and Plan


Neuro/Psych: 





Depression


Chronic Pain syndrome


History of CVA 1986/2007





Holding paroxetine 10 mg daily/home medication.  Resume when clinically 

indicated


Avoid sedatives and narcotics including tramadol/home medication, monitor neuro 

status. Awake and alert


Holding clopidogrel 75 mg daily.  Resume when okay with GI





CV: 





Atrial fibrillation rate controlled


Straight hypertension





On Levophed 2 mics- Monitor HR and BP keep MAP>65mmHg


Lactic acid 2.2 on 1/27


Holding clopidogrel 75 mg daily light of duodenal ulcer


Holding lisinopril 20 mill grams daily, Imdur and metoprolol 50 mill grams 

daily in light of hypotension











Pulm: 





Continue with oxygen keep sat >92%.  


Bronchodilators when necessary.





GI/liver: 





Upper GI bleed secondary to duodenal ulcer





NPO advance if ok with surgery/GI


On pantoprazole 40 mg by mouth daily


s/p EGD 1/26 which showed 2 large duodenal ulcers, grade D esophagitis, 

gastritis.





Renal/: 





Monitor renal function, I/O's, avoid nephrotoxins.


nephrology Dr. Hoang. Surgery is following- Dr. Juarez fungal infection 

requiring PD catheter removal. 





Heme:





Normocytic anemia


Thrombocytopenia





Monitor CBC, s/p transfusion 2units PRBC on 1/26





Endocrine: 





Diabetes mellitus





SSI for glycemic control with low Novulog before meals at bedtime


Holding Tradjenta 5 mg daily





ID: 





Infected peritoneal dialysis catheter/peritonitis fungal





Continue fluconazole 200 mg IV daily) monitor for signs of infections ( Fever, 

WBC) follow up on cultures.  Peritoneal fluid 1/26 positive for yeast/Candida


ID is following.








MSK:  





Right heel ulcer


Sacral decubitus ulcer





Wound care evaluate and treat





Prophylaxis: PPI/SCDs.





Lines: Right IJ CVP placed 1/25





Level II follow-up











Kailash Avina MD Jan 30, 2018 10:07

## 2018-01-30 NOTE — HHI.NPPN
Subjective


General Problems:  Anemia


Renal Failure:  End Stage Renal Disease


History of Present Illness


Patient is a very pleasant 67 year old male who came into the emergency room on 

01/26/18 hypotensive, lethargic, and with a hemoglobin of 6.7.  He was 

transfused 2 units of PRBC's with a recheck pending.  He was initially put on 

Levophed for blood pressure support however it is currently not running. Has a 

past medical history of End -stage renal disease with peritoneal dialysis, AFIB

, diabetes, arthritis, chronic pain, and hx of strokes.    Onset of acute 

epigastric pain and discomfort noted for 5 days ago, but worsened over the past 

24 hours.  Reported loose stools for 3 weeks.  In the emergency room patient 

did report melena, nausea, mild vomiting undigested food yesterday, but denied 

any coffee ground emesis. Patient has AV fistula and will have dialysis while 

here.  Per patient PD catheter needs to be removed secondary to infection.


Additional Remarks


Patient is alert, now off and on has confusion, not in distress, still on 

pressors.





Review of Systems


General


Constitutional:  Fatigue


General Remarks


weakness





Respiratory


Respiratory Remarks


Denies SOB





Cardiovascular


Cardiac Remarks


Denies CP





Gastrointestinal


GI Remarks


Denies abdominal pain





Objective Data


Data











 1/30/18 1/31/18





 19:00 07:00


 


Intake Total 250 ml 


 


Balance 250 ml 


 


  


 


Other 250 ml 











Vital Signs








  Date Time  Temp Pulse Resp B/P (MAP) Pulse Ox O2 Delivery O2 Flow Rate FiO2


 


1/30/18 15:35  118      


 


1/30/18 15:31  134      


 


1/30/18 15:25  117      


 


1/30/18 15:20  117      


 


1/30/18 15:15  117      


 


1/30/18 15:10  113      


 


1/30/18 15:05  119      


 


1/30/18 15:00  119      


 


1/30/18 14:55  120      


 


1/30/18 14:50  114      


 


1/30/18 14:45  120      


 


1/30/18 14:40  115      


 


1/30/18 14:35  123      


 


1/30/18 14:30  120      


 


1/30/18 14:25  121      


 


1/30/18 14:20  92      


 


1/30/18 14:15  101      


 


1/30/18 14:10  103      


 


1/30/18 14:05  112      


 


1/30/18 14:00  117      


 


1/30/18 12:00 98.1 139 24  98   


 


1/30/18 12:00  139      


 


1/30/18 11:00  103 13  99   


 


1/30/18 11:00  103      


 


1/30/18 10:00  125      


 


1/30/18 10:00  125 17  97   


 


1/30/18 09:53     100 Nasal Cannula 2.00 


 


1/30/18 09:50  117      


 


1/30/18 09:50  117 11 99/58 (72) 100   


 


1/30/18 09:00 98.0 107 17 99/42 (61) 100 Nasal Cannula 2 


 


1/30/18 08:45  110 17 96/52 (67) 100 Nasal Cannula 2 





    101/56 (71)    


 


1/30/18 08:30  102 17 99/42 (61) 97 Nasal Cannula 2 





    92/48 (63)    


 


1/30/18 08:13 97.7 98 17 112/58 (76) 99 Simple Mask 6 





    93/55 (68)    


 


1/30/18 06:40 97.7 94 16 98/56 (70) 100 Nasal Cannula 2 


 


1/30/18 00:00 97.5 90 20 102/57 (72) 97   


 


1/30/18 00:00  90      


 


1/29/18 22:05  99  99/59    


 


1/29/18 21:26     99 Nasal Cannula 2.00 


 


1/29/18 20:00 97.7 115 20 97/64 (75) 99   


 


1/29/18 20:00  115      


 


1/29/18 18:30  100      


 


1/29/18 18:15  107 22 96/53 (67) 98   


 


1/29/18 18:15  107      


 


1/29/18 18:00  96 16 98/55 (69) 100   


 


1/29/18 18:00  96      


 


1/29/18 17:45  84 13 97/57 (70) 100   


 


1/29/18 17:30  91 16 101/59 (73) 100   








-:  


1/30/18 0505                                                                   

             1/30/18 0505








Physical Exam


General


Appearance:  Malnourished





Neck


Neck Exam:  Neck Supple





Pulmonary


Resp Exam:  Clear Bilaterally





Cardiology


CV Exam:  Regular





Gastrointestinal/Abdomen


GI Exam:  Soft





Extremeties


Extremities Exam:  No Edema





Assessment/Plan


Problem List:  


(1) ESRD (end stage renal disease) on dialysis


ICD Codes:  N18.6 - End stage renal disease; Z99.2 - Dependence on renal 

dialysis


Plan:  Seen during dialysis.  


Potassium and phos WNL


Protein corrected calcium at 8.3.  Replacement added. 


Monitor fluid and electrolytes. 


HD done yesterday.


PD Catheter removed.


Continue antibiotics as per ID.


BP is still low, and has tachycardia.


HD will be in AM.





(2) Peritonitis associated with peritoneal dialysis


ICD Codes:  T85.71XA - Infection and inflammatory reaction due to peritoneal 

dialysis catheter, initial encounter


Plan:  Peritoneal fluid culture positive for Candida. 


PD catheter needs to be removed. 


ID and surgery following. 





(3) Hypotension


ICD Codes:  I95.9 - Hypotension, unspecified


Status:  Acute


Plan:  Sepsis present on admission. 


Continue supportive care. 





(4) GI bleed


ICD Codes:  K92.2 - Gastrointestinal hemorrhage, unspecified


Status:  Acute


Plan:  s/p EGD: duodenal ulcer, gastritis, esophagitis. 


Received blood transfusion. Hemoglobin is 8.








(5) Anemia


ICD Codes:  D64.9 - Anemia, unspecified





Problem Qualifiers





(1) Hypotension:  


Qualified Codes:  I95.9 - Hypotension, unspecified


(2) GI bleed:  


Qualified Codes:  K92.2 - Gastrointestinal hemorrhage, unspecified


(3) Anemia:  








Carlita Hoang MD Jan 30, 2018 17:26

## 2018-01-30 NOTE — MP
cc:

LINETTE العلي MD, ABDUL Q. MD NEMANI,MICHEAL CONRAD MD

****

 

 

DATE OF SURGERY: 1/30/2018

 

PREOPERATIVE DIAGNOSIS:

Fungal infection of peritoneal dialysis catheter.

 

POSTOPERATIVE DIAGNOSIS

Fungal infection of peritoneal dialysis catheter.

 

PROCEDURE

Removal of peritoneal dialysis catheter.

 

SURGEON

Dr. Linette العلي

 

ANESTHESIA

General

 

INDICATIONS

This is an unfortunate 67-year-old gentleman who had a peritoneal dialysis

catheter placed about a year and a half ago.  It functioned very well.  He has

developed fungal infection.  Recommendations were made for removal.

 

INTRAOPERATIVE FINDINGS

The entire PD catheter removed.

 

ESTIMATED BLOOD LOSS:

Less than 5 mL

 

DESCRIPTION OF PROCEDURE IN DETAIL

The patient identified as Percy Vega, taken to the operating room and placed

in supine position.  Sequential compression devices were placed on bilateral

lower extremities.  Following induction of adequate general endotracheal

anesthesia, the patient's abdomen was prepped and draped in the usual sterile

fashion with Betadine.

 

A time-out procedure was performed.  Following completion of time-out procedure

to everyone's satisfaction within the room,  local anesthetic was injected

around the peritoneal dialysis catheter exit site along its tract to the second

palpable cuff in the infraumbilical position slightly left of midline.  The

proximal cuff had extruded through the incision.  It was released from the

remaining attachments to subcutaneous tissue using a scalpel.  Hemostat was

used to dissect along the catheter.

 

A counter incision was made over the second cuff which was palpable through the

skin and subcutaneous tissue.  It was mobilized with a hemostat and brought

into the second open surgical wound.  The distal cuff and catheter was excised

and the catheter was brought out through the wound.  The cuff was then able to

be visualized and  from surrounding subcutaneous tissue using a

scalpel.  The tunnel tract was identified and grasped with a mosquito hemostat.

The catheter was removed from the peritoneal cavity in its entirety and

discarded.  A figure-of-eight 2-0 Vicryl suture was used to occlude the

catheter tract.  Copious irrigation was then performed.  The catheter exit site

skin which was irritated was excised in its entirety with an elliptical

incision.  Hemostasis was controlled electrocautery.  Further irrigation

ensued.  The two wounds were approximated with interrupted inverted 4-0

Monocryl sutures.  Dry dressing was applied.

 

The patient tolerated the procedure without apparent complication.  Sponge,

needle and instrument counts were correct at the end of the case.

 

 

                              _________________________________

                              MD GABINO Rodriguez

D:  1/30/2018/8:03 AM

T:  1/30/2018/10:41 PM

Visit #:  [id number]

Job #:  [id number]

## 2018-01-30 NOTE — PD.OP
__________________________________________________





Operative Report


Date of Surgery:  Jan 30, 2018


Preoperative Diagnosis:  


fungal infection of PD catheter


Postoperative Diagnosis:  


same


Procedure:


removal PD catheter


Anesthesia:


general


Surgeon:


Donny Juarez


Assistant(s):


Estee


Operation and Findings:


PD catheter removed. EBL less than 5 ml.











Donny Juarez MD Jan 30, 2018 08:08

## 2018-01-31 VITALS
DIASTOLIC BLOOD PRESSURE: 56 MMHG | SYSTOLIC BLOOD PRESSURE: 101 MMHG | OXYGEN SATURATION: 100 % | RESPIRATION RATE: 11 BRPM | TEMPERATURE: 98.4 F | HEART RATE: 79 BPM

## 2018-01-31 VITALS — HEART RATE: 101 BPM

## 2018-01-31 VITALS
OXYGEN SATURATION: 87 % | RESPIRATION RATE: 11 BRPM | TEMPERATURE: 97.5 F | SYSTOLIC BLOOD PRESSURE: 91 MMHG | HEART RATE: 102 BPM | DIASTOLIC BLOOD PRESSURE: 53 MMHG

## 2018-01-31 VITALS
TEMPERATURE: 97.6 F | DIASTOLIC BLOOD PRESSURE: 55 MMHG | HEART RATE: 96 BPM | OXYGEN SATURATION: 100 % | RESPIRATION RATE: 20 BRPM | SYSTOLIC BLOOD PRESSURE: 91 MMHG

## 2018-01-31 VITALS
OXYGEN SATURATION: 70 % | RESPIRATION RATE: 14 BRPM | TEMPERATURE: 98.2 F | HEART RATE: 122 BPM | SYSTOLIC BLOOD PRESSURE: 98 MMHG | DIASTOLIC BLOOD PRESSURE: 53 MMHG

## 2018-01-31 VITALS
SYSTOLIC BLOOD PRESSURE: 107 MMHG | DIASTOLIC BLOOD PRESSURE: 76 MMHG | TEMPERATURE: 97.7 F | HEART RATE: 95 BPM | RESPIRATION RATE: 20 BRPM

## 2018-01-31 VITALS
SYSTOLIC BLOOD PRESSURE: 89 MMHG | TEMPERATURE: 98 F | HEART RATE: 106 BPM | DIASTOLIC BLOOD PRESSURE: 52 MMHG | RESPIRATION RATE: 11 BRPM | OXYGEN SATURATION: 98 %

## 2018-01-31 VITALS — OXYGEN SATURATION: 97 %

## 2018-01-31 LAB
BASOPHILS # BLD AUTO: 0 TH/MM3 (ref 0–0.2)
BASOPHILS NFR BLD: 0.1 % (ref 0–2)
BUN SERPL-MCNC: 37 MG/DL (ref 7–18)
CALCIUM SERPL-MCNC: 7 MG/DL (ref 8.5–10.1)
CALCIUM TP COR SERPL-MCNC: 8.1 MG/DL (ref 8.5–10.1)
CHLORIDE SERPL-SCNC: 98 MEQ/L (ref 98–107)
CREAT SERPL-MCNC: 4.38 MG/DL (ref 0.6–1.3)
EOSINOPHIL # BLD: 0 TH/MM3 (ref 0–0.4)
EOSINOPHIL NFR BLD: 0.2 % (ref 0–4)
ERYTHROCYTE [DISTWIDTH] IN BLOOD BY AUTOMATED COUNT: 21 % (ref 11.6–17.2)
GFR SERPLBLD BASED ON 1.73 SQ M-ARVRAT: 14 ML/MIN (ref 89–?)
GLUCOSE SERPL-MCNC: 134 MG/DL (ref 74–106)
HCO3 BLD-SCNC: 28.2 MEQ/L (ref 21–32)
HCT VFR BLD CALC: 26 % (ref 39–51)
HGB BLD-MCNC: 8.6 GM/DL (ref 13–17)
LYMPHOCYTES # BLD AUTO: 0.4 TH/MM3 (ref 1–4.8)
LYMPHOCYTES NFR BLD AUTO: 4.4 % (ref 9–44)
MCH RBC QN AUTO: 32.9 PG (ref 27–34)
MCHC RBC AUTO-ENTMCNC: 33.2 % (ref 32–36)
MCV RBC AUTO: 99 FL (ref 80–100)
MONOCYTE #: 0.5 TH/MM3 (ref 0–0.9)
MONOCYTES NFR BLD: 5.5 % (ref 0–8)
NEUTROPHILS # BLD AUTO: 7.8 TH/MM3 (ref 1.8–7.7)
NEUTROPHILS NFR BLD AUTO: 89.8 % (ref 16–70)
PLATELET # BLD: 150 TH/MM3 (ref 150–450)
PMV BLD AUTO: 8.1 FL (ref 7–11)
PROT SERPL-MCNC: 5.1 GM/DL (ref 6.4–8.2)
RBC # BLD AUTO: 2.63 MIL/MM3 (ref 4.5–5.9)
SODIUM SERPL-SCNC: 135 MEQ/L (ref 136–145)
WBC # BLD AUTO: 8.7 TH/MM3 (ref 4–11)

## 2018-01-31 RX ADMIN — HEPARIN SODIUM SCH UNITS: 10000 INJECTION, SOLUTION INTRAVENOUS; SUBCUTANEOUS at 20:19

## 2018-01-31 RX ADMIN — CALCIUM CARBONATE (ANTACID) CHEW TAB 500 MG SCH MG: 500 CHEW TAB at 09:18

## 2018-01-31 RX ADMIN — CHLORHEXIDINE GLUCONATE SCH PACK: 500 CLOTH TOPICAL at 23:25

## 2018-01-31 RX ADMIN — MIDODRINE HYDROCHLORIDE SCH MG: 5 TABLET ORAL at 17:00

## 2018-01-31 RX ADMIN — NOREPINEPHRINE BITARTRATE PRN MLS/HR: 1 INJECTION INTRAVENOUS at 05:43

## 2018-01-31 RX ADMIN — STANDARDIZED SENNA CONCENTRATE AND DOCUSATE SODIUM SCH TAB: 8.6; 5 TABLET, FILM COATED ORAL at 09:18

## 2018-01-31 RX ADMIN — HUMAN INSULIN SCH: 100 INJECTION, SOLUTION SUBCUTANEOUS at 17:00

## 2018-01-31 RX ADMIN — FLUCONAZOLE SCH MLS/HR: 2 INJECTION, SOLUTION INTRAVENOUS at 17:00

## 2018-01-31 RX ADMIN — STANDARDIZED SENNA CONCENTRATE AND DOCUSATE SODIUM SCH TAB: 8.6; 5 TABLET, FILM COATED ORAL at 20:16

## 2018-01-31 RX ADMIN — HUMAN INSULIN SCH: 100 INJECTION, SOLUTION SUBCUTANEOUS at 12:00

## 2018-01-31 RX ADMIN — HUMAN INSULIN SCH: 100 INJECTION, SOLUTION SUBCUTANEOUS at 08:00

## 2018-01-31 RX ADMIN — PANTOPRAZOLE SCH MG: 40 TABLET, DELAYED RELEASE ORAL at 09:18

## 2018-01-31 RX ADMIN — Medication SCH ML: at 09:00

## 2018-01-31 RX ADMIN — Medication SCH ML: at 20:19

## 2018-01-31 RX ADMIN — HUMAN INSULIN SCH: 100 INJECTION, SOLUTION SUBCUTANEOUS at 20:17

## 2018-01-31 RX ADMIN — CALCIUM CARBONATE (ANTACID) CHEW TAB 500 MG SCH MG: 500 CHEW TAB at 20:19

## 2018-01-31 RX ADMIN — MIDODRINE HYDROCHLORIDE SCH MG: 5 TABLET ORAL at 12:00

## 2018-01-31 NOTE — HHI.CCPN
Subjective


Remarks/Hospital Course


67 year-old male with a medical history significant for end-stage renal disease 

on peritoneal dialysis who was noted to be severely hypotensive and lethargic 

at the dialysis center on 1/25 and 911 was called and patient was transferred 

to [facility] ER.  On arrival he was noted to be hypotensive with SBP 70s 

and heart rate 100s.  He also reportedly had significant melena and rectal 

bleeding following arrival.  A right IJ central line was placed by ER 

physician.  Patient's wife arrived and wanted to make patient DNR status and 

did not want blood transfusions and wished transferring to hospice however 

subsequently patient woke up and wanted to continue aggressive care including 

blood transfusions.  ER physician contacted me questioning patient's wife's 

ability to make decisions for him due to concern for secondary gain as well as 

possible psychiatric illness.  Patient was accepted for admission by critical 

care medicine service.  4 units PRBCs were ordered to be transfused stat by ER 

physician.  When I arrived to see the patient he was laying in the ER stretcher 

on nasal cannula on 5 mics of Levophed.  He is drowsy but easily arousable.  He 

knew he was at the hospital.


He could not give me details of his history.  He did tell me that Dr. Hoang is 

his nephrologist.





1/27 Patient is on Levophed 2 mics, s/p HD yesterday. Awake and alert on 

Protonix drip. s/p EGD yesterday showed 2 large Duodenal ulcer, esophagitis and 

gastritis.


1/28:  Afebrile.  Currently off norepinephrine but MAP is around 60 currently.  

States he is okay with removing peritoneal dialysis catheter.  Will discuss 

with general surgery in nephrology in a.m.


1/29:  Blood pressures are borderline.  Awake and alert and eating popsicles 

currently.  Patient's okay with removing peroneal dialysis catheter.  Dr. Koch as yet to see patient today.  Agree with ID and nephrology's 

recommendations for removal.


1/30 Patient s/p PD catheter removal this morning. On Levophed 2 mics. Afebrile.





Subjective





1/31:  Postoperative day #1 PD catheter removal.  Remains on norepinephrine at 

2 g per minute.  Asymptomatic.  Plan for hemodialysis today.





Objective





Vital Signs








  Date Time  Temp Pulse Resp B/P (MAP) Pulse Ox O2 Delivery O2 Flow Rate FiO2


 


1/31/18 08:00 98.4 79 11 101/56 (22) 100   


 


1/31/18 07:00      Nasal Cannula 2.00 


 


1/29/18 08:32        21














Intake and Output   


 


 1/31/18 1/31/18 2/1/18





 08:00 16:00 00:00


 


Intake Total 120 ml  


 


Output Total 0 ml  


 


Balance 120 ml  








Result Diagram:  


1/31/18 0530                                                                   

             1/31/18 0530





Other Results





Microbiology








 Date/Time


Source Procedure


Growth Status


 


 


 1/26/18 07:19


Blood Peripheral Aerobic Blood Culture - Preliminary


NO GROWTH IN 4 DAYS Resulted


 


 1/26/18 07:19


Blood Peripheral Anaerobic Blood Culture - Preliminary


NO GROWTH IN 4 DAYS Resulted





 1/26/18 15:20


Fluid Peritoneal Fluid Fungal Smear - Final


NO FUNGAL ELEMENTS SEEN. Resulted


 


 1/26/18 15:20 Fungal Culture - Preliminary


Candida Albicans Resulted








Imaging





Last Impressions








Chest X-Ray 1/27/18 0000 Signed





Impressions: 





 Service Date/Time:  Saturday, January 27, 2018 03:15 - CONCLUSION:  Patchy 





 consolidation or atelectasis of the left lower lung.     Chucky Morton MD 








Objective Remarks


GENERAL: 67 year  male resting in bed in no acute distress


SKIN: Woman dry.  No rash


HEAD: Atraumatic. Normocephalic. 


EYES:  No scleral icterus. No injection or drainage. 


ENT: No nasal bleeding or discharge.  Mucous membranes pink and dry..


NECK: Trachea midline.  Supple.


CARDIOVASCULAR: Heart rate in the 80s.  Irregularly irregular consistent with 

A. fib.


RESPIRATORY: No accessory muscle use. Clear to auscultation. Breath sounds 

equal bilaterally.  Decreased respiratory effort.


GASTROINTESTINAL: Abdomen soft, nondistended.  PD catheter has been removed.  

Site is clean dry and intact.  No signs erythema drainage.


MUSCULOSKELETAL: Patient has a well-healed rt heel ulcer.  Venous stasis 

changes noted to his bilateral lower extremities.


NEUROLOGICAL: Awake and weak and mildly confused. No obvious cranial nerve 

deficits.  Moves bilateral upper extremities.


BACK: Stage I/early decubitus in his sacral area.


Urinary Catheter:  No


Assessment to:  Continue


Vascular Central Line Catheter:  Yes


Assessment to:  Continue


Line:  Central Venous Catheter


Side:  Right


Location:  Internal, Jugular





A/P


Assessment and Plan


Neuro/Psych: 





Depression


Chronic Pain syndrome


History of CVA 1986/2007





Holding paroxetine 10 mg daily/home medication.  Resume when clinically 

indicated


Avoid sedatives and narcotics including tramadol/home medication, monitor neuro 

status. Awake and alert


Holding clopidogrel 75 mg daily.  Resume when okay with GI





CV: 





Atrial fibrillation rate controlled


History of essential hypertension





On norepinephrine at 2 g per minute- Monitor HR and BP keep MAP>65mmHg


Lactic acid 2.2 on 1/27


Holding clopidogrel 75 mg daily light of duodenal ulcer


Holding lisinopril 20 mill grams daily, Imdur and metoprolol 50 mill grams 

daily in light of hypotension


Check cortisol level.


Start Midodrine 5 mg 3 times a day








Pulm: 





Continue with oxygen keep sat >92%.  Currently on room air


Albuterol aerosols every 2 hours when necessary dyspnea





GI/liver: 





Upper GI bleed secondary to duodenal ulcer





NPO advance if ok with surgery/GI


On pantoprazole 40 mg by mouth daily


s/p EGD 1/26 which showed 2 large duodenal ulcers, grade D esophagitis, 

gastritis.





Renal/: 





Status post PD catheter removal day #1 Dr. Juarez


End-stage renal disease on hemodialysis





Monitor renal function, I/O's, avoid nephrotoxins.


Pathology Dr. Hoang.  General Surgery is following- Dr. Juarez fungal 

infection requiring PD catheter removal. 





Heme:





Normocytic anemia





Monitor CBC, s/p transfusion 2units PRBC on 1/26





Endocrine: 





Diabetes mellitus





SSI for glycemic control with low Novulog before meals at bedtime


Holding Tradjenta 5 mg daily





ID: 





Infected peritoneal dialysis catheter/peritonitis fungal





Continue fluconazole 200 mg IV daily) monitor for signs of infections ( Fever, 

WBC) follow up on cultures.  Peritoneal fluid 1/26 positive for yeast/Candida


ID is following.








MSK:  





Right heel ulcer


Sacral decubitus ulcer





Wound care evaluate and treat





Prophylaxis: Pantoprazole/heparin subcutaneous





Lines: Right IJ CVP placed 1/25





Level II follow-up











Chase Steen MD Jan 31, 2018 10:56

## 2018-01-31 NOTE — HHI.NPPN
Subjective


General Problems:  Anemia


Renal Failure:  End Stage Renal Disease


History of Present Illness


Patient is a very pleasant 67 year old male who came into the emergency room on 

01/26/18 hypotensive, lethargic, and with a hemoglobin of 6.7.  He was 

transfused 2 units of PRBC's with a recheck pending.  He was initially put on 

Levophed for blood pressure support however it is currently not running. Has a 

past medical history of End -stage renal disease with peritoneal dialysis, AFIB

, diabetes, arthritis, chronic pain, and hx of strokes.    Onset of acute 

epigastric pain and discomfort noted for 5 days ago, but worsened over the past 

24 hours.  Reported loose stools for 3 weeks.  In the emergency room patient 

did report melena, nausea, mild vomiting undigested food yesterday, but denied 

any coffee ground emesis. Patient has AV fistula and will have dialysis while 

here.  Per patient PD catheter needs to be removed secondary to infection.


Additional Remarks


Patient is alert.  Reports that he is feeling much better today. Continues on 

low dose pressors with SBP in the 90's.  Plan for dialysis today.


 (Gellermann,Diane M. ARNP)





Review of Systems


General


Constitutional:  Fatigue


General Remarks


weakness


 (Gellermann,Diane M. ARNP)





Respiratory


Respiratory Remarks


Denies SOB


 (Gellermann,Diane M. ARNP)





Cardiovascular


Cardiac Remarks


Denies CP


 (Gellermann,Diane M. ARNP)





Gastrointestinal


GI Remarks


Denies abdominal pain


 (Gellermann,Diane M. ARNP)





Objective Data


Data





Vital Signs








  Date Time  Temp Pulse Resp B/P (MAP) Pulse Ox O2 Delivery O2 Flow Rate FiO2


 


1/31/18 05:43  95  91/53    


 


1/31/18 04:00  95      


 


1/31/18 04:00 97.7 95 20 107/76 (86)    


 


1/31/18 00:00 97.6 96 20 91/55 (67) 100   


 


1/31/18 00:00  96      


 


1/30/18 20:32     95 Nasal Cannula 2.00 


 


1/30/18 20:00  117      


 


1/30/18 20:00 97.7 117 20 90/66 (74) 97   


 


1/30/18 19:35  115      


 


1/30/18 19:30  97      


 


1/30/18 19:25  135      


 


1/30/18 19:20  115      


 


1/30/18 19:16  140      


 


1/30/18 19:10  122      


 


1/30/18 19:05  152      


 


1/30/18 19:00  117      


 


1/30/18 18:55  113      


 


1/30/18 18:50  119      


 


1/30/18 18:46  126      


 


1/30/18 18:40  121      


 


1/30/18 18:35  120      


 


1/30/18 18:30  124      


 


1/30/18 18:25  110      


 


1/30/18 18:20  114      


 


1/30/18 18:15  126      


 


1/30/18 18:10  126      


 


1/30/18 18:05  140      


 


1/30/18 18:00  120      


 


1/30/18 17:35  124      


 


1/30/18 17:35  124 13 91/61 (71)    


 


1/30/18 17:30  125      


 


1/30/18 17:30  125 14 94/59 (71)    


 


1/30/18 17:25  118 14 96/51 (66)    


 


1/30/18 17:25  118      


 


1/30/18 17:20  122 16 93/57 (69)    


 


1/30/18 17:20  122      


 


1/30/18 17:15  126 13 97/58 (71)    


 


1/30/18 17:15  126      


 


1/30/18 17:10  130 12 93/53 (66)    


 


1/30/18 17:10  130      


 


1/30/18 17:05  136 20 93/57 (69)    


 


1/30/18 17:05  136      


 


1/30/18 17:00  135 15 91/61 (71)    


 


1/30/18 17:00  135      


 


1/30/18 16:56  138      


 


1/30/18 16:56  138 13 101/68 (79)    


 


1/30/18 16:50  127      


 


1/30/18 16:50  127 21 78/52 (61)    


 


1/30/18 16:45  129 17 99/57 (71)    


 


1/30/18 16:45  129      


 


1/30/18 16:40  123      


 


1/30/18 16:40  123 13 92/59 (70)    


 


1/30/18 16:35  140      


 


1/30/18 16:35  140 12 93/55 (68)    


 


1/30/18 16:30  118 12 93/52 (66)    


 


1/30/18 16:30  118      


 


1/30/18 16:25  114      


 


1/30/18 16:25  114 11 101/55 (70)    


 


1/30/18 16:20  125      


 


1/30/18 16:20  125 12 92/51 (65)    


 


1/30/18 16:15  126      


 


1/30/18 16:15  126 12 102/60 (74) 70   


 


1/30/18 16:10  119 14 92/61 (71) 96   


 


1/30/18 16:10  119      


 


1/30/18 16:05  121      


 


1/30/18 16:05  121 11 92/54 (67) 100   


 


1/30/18 16:00 98.9 123 12 106/55 (72) 99   


 


1/30/18 16:00  123      


 


1/30/18 15:35  118      


 


1/30/18 15:31  134      


 


1/30/18 15:25  117      


 


1/30/18 15:20  117      


 


1/30/18 15:15  117      


 


1/30/18 15:10  113      


 


1/30/18 15:05  119      


 


1/30/18 15:00  119      


 


1/30/18 14:55  120      


 


1/30/18 14:50  114      


 


1/30/18 14:45  120      


 


1/30/18 14:40  115      


 


1/30/18 14:35  123      


 


1/30/18 14:30  120      


 


1/30/18 14:25  121      


 


1/30/18 14:20  92      


 


1/30/18 14:15  101      


 


1/30/18 14:10  103      


 


1/30/18 14:05  112      


 


1/30/18 14:00  117      


 


1/30/18 12:00 98.1 139 24  98   


 


1/30/18 12:00  139      


 


1/30/18 11:00  103 13  99   


 


1/30/18 11:00  103      


 


1/30/18 10:00  125      


 


1/30/18 10:00  125 17  97   


 


1/30/18 09:53     100 Nasal Cannula 2.00 








 (Gellermann,Diane M. ARNP)


-:  


1/31/18 0530                                                                   

             1/31/18 0530





Imaging





Last Impressions








Chest X-Ray 1/27/18 0000 Signed





Impressions: 





 Service Date/Time:  Saturday, January 27, 2018 03:15 - CONCLUSION:  Patchy 





 consolidation or atelectasis of the left lower lung.     Chucky Morton MD 








 (Gellermann,Diane M. ARNP)





Physical Exam


General


Appearance:  Malnourished


 (Gellermann,Diane M. ARNP)





Neck


Neck Exam:  Neck Supple


 (Gellermann,Diane M. ARNP)





Pulmonary


Resp Exam:  Clear Bilaterally


 (Gellermann,Diane M. ARNP)





Cardiology


CV Exam:  Regular


 (Gellermann,Diane M. ARNP)





Gastrointestinal/Abdomen


GI Exam:  Soft


 (Gellermann,Diane M. ARNP)





Extremeties


Extremities Exam:  No Edema


 (Gellermann,Diane M. ARNP)





Assessment/Plan


Problem List:  


(1) ESRD (end stage renal disease) on dialysis


ICD Codes:  N18.6 - End stage renal disease; Z99.2 - Dependence on renal 

dialysis


Plan:  


Potassium and phos WNL


Protein corrected calcium at 8.1 on replacement.


PD Catheter removed.


Continue antibiotics as per ID.


BP is still low on low dose norepinephrine with SBP in the 90's.


HD today





(2) Peritonitis associated with peritoneal dialysis


ICD Codes:  T85.71XA - Infection and inflammatory reaction due to peritoneal 

dialysis catheter, initial encounter


Plan:  Peritoneal fluid culture positive for Candida. 


PD catheter removed


ID and surgery following. 





(3) Hypotension


ICD Codes:  I95.9 - Hypotension, unspecified


Status:  Acute


Plan:  Sepsis present on admission. 


Continue supportive care. 





(4) GI bleed


ICD Codes:  K92.2 - Gastrointestinal hemorrhage, unspecified


Status:  Acute


Plan:  s/p EGD: duodenal ulcer, gastritis, esophagitis. 


Received blood transfusion. Hemoglobin stable








(5) Anemia


ICD Codes:  D64.9 - Anemia, unspecified


Plan:  HGB stable at 8.6 procrit with dialysis


 (Gellermann,Diane M. ARNP)


Problem List:  


(1) ESRD (end stage renal disease) on dialysis


ICD Codes:  N18.6 - End stage renal disease; Z99.2 - Dependence on renal 

dialysis


Plan:  


Potassium and phos WNL


Protein corrected calcium at 8.1 on replacement.


PD Catheter removed.


Continue antibiotics as per ID.


BP is still low on low dose norepinephrine with SBP in the 90's.


HD today, I will add Midodrine, try to get off Norepi.


Not much fluid removal with HD.





(2) Peritonitis associated with peritoneal dialysis


ICD Codes:  T85.71XA - Infection and inflammatory reaction due to peritoneal 

dialysis catheter, initial encounter


Plan:  Peritoneal fluid culture positive for Candida. 


PD catheter removed


ID and surgery following. 





(3) Hypotension


ICD Codes:  I95.9 - Hypotension, unspecified


Status:  Acute


Plan:  Sepsis present on admission. 


Continue supportive care. 





(4) GI bleed


ICD Codes:  K92.2 - Gastrointestinal hemorrhage, unspecified


Status:  Acute


Plan:  s/p EGD: duodenal ulcer, gastritis, esophagitis. 


Received blood transfusion. Hemoglobin stable








(5) Anemia


ICD Codes:  D64.9 - Anemia, unspecified


Plan:  HGB stable at 8.6 procrit with dialysis


 (Carlita Hoang MD)





Problem Qualifiers





(1) Hypotension:  


Qualified Codes:  I95.9 - Hypotension, unspecified


(2) GI bleed:  


Qualified Codes:  K92.2 - Gastrointestinal hemorrhage, unspecified


(3) Anemia:  








Gellermann,Diane M. ARNP Jan 31, 2018 09:56


Carlita Hoang MD Jan 31, 2018 11:12

## 2018-01-31 NOTE — HHI.IDPN
Note


Infectious Disease Note














ID coverage: For Dr Kamara. 


Notes reviewed. 


Patient says he feels okay.


Has visual hallucinations but is oriented x 3. 


Afebrile. 








C. albicans in the peritoneal fluid. 


Post removal of PD catheter. 








Antibiotics


fluconazole


Allergies:  


Coded Allergies:  


     morphine (Unverified  Allergy, Severe, anaphylactic, 1/25/18)





Objective





Vital Signs








  Date Time  Temp Pulse Resp B/P (MAP) Pulse Ox O2 Delivery O2 Flow Rate FiO2


 


1/31/18 08:00 98.4 79 11 101/56 (71) 100   


 


1/31/18 07:00     97 Nasal Cannula 2.00 


 


1/31/18 05:43  95  91/53    


 


1/31/18 04:00  95      


 


1/31/18 04:00 97.7 95 20 107/76 (86)    


 


1/31/18 00:00 97.6 96 20 91/55 (67) 100   


 


1/31/18 00:00  96      


 


1/30/18 20:32     95 Nasal Cannula 2.00 


 


1/30/18 20:00  117      


 


1/30/18 20:00 97.7 117 20 90/66 (74) 97   


 


1/30/18 19:35  115      


 


1/30/18 19:30  97      


 


1/30/18 19:25  135      


 


1/30/18 19:20  115      


 


1/30/18 19:16  140      


 


1/30/18 19:10  122      


 


1/30/18 19:05  152      


 


1/30/18 19:00  117      


 


1/30/18 18:55  113      


 


1/30/18 18:50  119      


 


1/30/18 18:46  126      


 


1/30/18 18:40  121      


 


1/30/18 18:35  120      


 


1/30/18 18:30  124      


 


1/30/18 18:25  110      


 


1/30/18 18:20  114      


 


1/30/18 18:15  126      


 


1/30/18 18:10  126      


 


1/30/18 18:05  140      


 


1/30/18 18:00  120      


 


1/30/18 17:35  124      


 


1/30/18 17:35  124 13 91/61 (71)    


 


1/30/18 17:30  125      


 


1/30/18 17:30  125 14 94/59 (71)    


 


1/30/18 17:25  118 14 96/51 (66)    


 


1/30/18 17:25  118      


 


1/30/18 17:20  122 16 93/57 (69)    


 


1/30/18 17:20  122      


 


1/30/18 17:15  126 13 97/58 (71)    


 


1/30/18 17:15  126      


 


1/30/18 17:10  130 12 93/53 (66)    


 


1/30/18 17:10  130      


 


1/30/18 17:05  136 20 93/57 (69)    


 


1/30/18 17:05  136      


 


1/30/18 17:00  135 15 91/61 (71)    


 


1/30/18 17:00  135      


 


1/30/18 16:56  138      


 


1/30/18 16:56  138 13 101/68 (79)    


 


1/30/18 16:50  127      


 


1/30/18 16:50  127 21 78/52 (61)    


 


1/30/18 16:45  129 17 99/57 (71)    


 


1/30/18 16:45  129      


 


1/30/18 16:40  123      


 


1/30/18 16:40  123 13 92/59 (70)    


 


1/30/18 16:35  140      


 


1/30/18 16:35  140 12 93/55 (68)    


 


1/30/18 16:30  118 12 93/52 (66)    


 


1/30/18 16:30  118      


 


1/30/18 16:25  114      


 


1/30/18 16:25  114 11 101/55 (70)    


 


1/30/18 16:20  125      


 


1/30/18 16:20  125 12 92/51 (65)    


 


1/30/18 16:15  126      


 


1/30/18 16:15  126 12 102/60 (74) 70   


 


1/30/18 16:10  119 14 92/61 (71) 96   


 


1/30/18 16:10  119      


 


1/30/18 16:05  121      


 


1/30/18 16:05  121 11 92/54 (67) 100   


 


1/30/18 16:00 98.9 123 12 106/55 (72) 99   


 


1/30/18 16:00  123      


 


1/30/18 15:35  118      


 


1/30/18 15:31  134      


 


1/30/18 15:25  117      


 


1/30/18 15:20  117      


 


1/30/18 15:15  117      


 


1/30/18 15:10  113      


 


1/30/18 15:05  119      


 


1/30/18 15:00  119      


 


1/30/18 14:55  120      


 


1/30/18 14:50  114      


 


1/30/18 14:45  120      


 


1/30/18 14:40  115      


 


1/30/18 14:35  123      


 


1/30/18 14:30  120      


 


1/30/18 14:25  121      


 


1/30/18 14:20  92      


 


1/30/18 14:15  101      


 


1/30/18 14:10  103      


 


1/30/18 14:05  112      


 


1/30/18 14:00  117      


 


1/30/18 12:00 98.1 139 24  98   


 


1/30/18 12:00  139      








Laboratory Tests








Test


  1/31/18


05:30


 


White Blood Count 8.7 TH/MM3 


 


Red Blood Count 2.63 MIL/MM3 


 


Hemoglobin 8.6 GM/DL 


 


Hematocrit 26.0 % 


 


Mean Corpuscular Volume 99.0 FL 


 


Mean Corpuscular Hemoglobin 32.9 PG 


 


Mean Corpuscular Hemoglobin


Concent 33.2 % 


 


 


Red Cell Distribution Width 21.0 % 


 


Platelet Count 150 TH/MM3 


 


Mean Platelet Volume 8.1 FL 


 


Neutrophils (%) (Auto) 89.8 % 


 


Lymphocytes (%) (Auto) 4.4 % 


 


Monocytes (%) (Auto) 5.5 % 


 


Eosinophils (%) (Auto) 0.2 % 


 


Basophils (%) (Auto) 0.1 % 


 


Neutrophils # (Auto) 7.8 TH/MM3 


 


Lymphocytes # (Auto) 0.4 TH/MM3 


 


Monocytes # (Auto) 0.5 TH/MM3 


 


Eosinophils # (Auto) 0.0 TH/MM3 


 


Basophils # (Auto) 0.0 TH/MM3 


 


CBC Comment DIFF FINAL 


 


Differential Comment  


 


Blood Urea Nitrogen 37 MG/DL 


 


Creatinine 4.38 MG/DL 


 


Random Glucose 134 MG/DL 


 


Total Protein 5.1 GM/DL 


 


Calcium Level 7.0 MG/DL 


 


Sodium Level 135 MEQ/L 


 


Potassium Level 4.3 MEQ/L 


 


Chloride Level 98 MEQ/L 


 


Carbon Dioxide Level 28.2 MEQ/L 


 


Anion Gap 9 MEQ/L 


 


Estimat Glomerular Filtration


Rate 14 ML/MIN 


 


 


Protein Corrected Calcium 8.1 MG/DL 














Imaging


 











Chest X-Ray 1/27/18 0000 Signed





Impressions: 





 Service Date/Time:  Saturday, January 27, 2018 03:15 - CONCLUSION:  Patchy 





 consolidation or atelectasis of the left lower lung.     Chucky Morton MD 








Physical Exam





GENERAL:  Patient is in no acute distress.


HEENT:  EOMI, No icterus.


NECK: Supple.


LUNGS:  Clear breath sounds.


CARDIAC:  Regular rate and rhythm. 


ABDOMEN:  Soft, non tender.


EXTREMITIES:  No clubbing, cyanosis or edema. 


SKIN:  No rash.








Assessment and Plan


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


Sepis suspeced on presentation


sp GI bleed 





Continue fluconazole, can be switched to PO


cont for 2 weeks post op if  blood clx remain negative.

















Pilo Hamlin MD Jan 31, 2018 11:52

## 2018-01-31 NOTE — OTSOAPIP
TIME SESSION COMPLETED:  PM



TREATMENT TIME:    0  MINS.

CHART REVIEWED.  



ATTEMPTED TO SEE FOR OT EVALUATION, HOWEVER HAVING DIALYSIS. 



Therapist: CARITO RIVERS OT/L

                          Signature on file

## 2018-01-31 NOTE — HHI.PR
__________________________________________________





Subjective


Subjective Notes


" I did not feel a thing". No pain





Objective


Vitals/I&O





Vital Signs








  Date Time  Temp Pulse Resp B/P (MAP) Pulse Ox O2 Delivery O2 Flow Rate FiO2


 


1/31/18 05:43  95  91/53    


 


1/31/18 04:00 97.7  20     


 


1/31/18 00:00     100   


 


1/30/18 20:32      Nasal Cannula 2.00 


 


1/29/18 08:32        21








Labs





Laboratory Tests








Test


  1/31/18


05:30


 


White Blood Count 8.7 


 


Red Blood Count 2.63 


 


Hemoglobin 8.6 


 


Hematocrit 26.0 


 


Mean Corpuscular Volume 99.0 


 


Mean Corpuscular Hemoglobin 32.9 


 


Mean Corpuscular Hemoglobin


Concent 33.2 


 


 


Red Cell Distribution Width 21.0 


 


Platelet Count 150 


 


Mean Platelet Volume 8.1 


 


Neutrophils (%) (Auto) 89.8 


 


Lymphocytes (%) (Auto) 4.4 


 


Monocytes (%) (Auto) 5.5 


 


Eosinophils (%) (Auto) 0.2 


 


Basophils (%) (Auto) 0.1 


 


Neutrophils # (Auto) 7.8 


 


Lymphocytes # (Auto) 0.4 


 


Monocytes # (Auto) 0.5 


 


Eosinophils # (Auto) 0.0 


 


Basophils # (Auto) 0.0 


 


CBC Comment DIFF FINAL 


 


Differential Comment  


 


Blood Urea Nitrogen 37 


 


Creatinine 4.38 


 


Random Glucose 134 


 


Total Protein 5.1 


 


Calcium Level 7.0 


 


Sodium Level 135 


 


Potassium Level 4.3 


 


Chloride Level 98 


 


Carbon Dioxide Level 28.2 


 


Anion Gap 9 


 


Estimat Glomerular Filtration


Rate 14 


 


 


Protein Corrected Calcium 8.1 














 Date/Time


Source Procedure


Growth Status


 


 


 1/26/18 07:19


Blood Peripheral Aerobic Blood Culture - Preliminary


NO GROWTH IN 4 DAYS Resulted


 


 1/26/18 07:19


Blood Peripheral Anaerobic Blood Culture - Preliminary


NO GROWTH IN 4 DAYS Resulted





 1/26/18 15:20


Fluid Peritoneal Fluid Fungal Smear - Final


NO FUNGAL ELEMENTS SEEN. Resulted


 


 1/26/18 15:20 Fungal Culture - Preliminary


Candida Albicans Resulted








Narrative Exam


Dressing dry, minimal serosanguinous drainage. Non tender abdomen.





A/P


Assessment and Plan


POD 1 s/p PD catheter removal. Recovering well.


Dry dressing as needed.


Will sign off. Please call me if needed [phone].











Donny Juarez MD Jan 31, 2018 08:20

## 2018-02-01 VITALS
SYSTOLIC BLOOD PRESSURE: 94 MMHG | TEMPERATURE: 97.5 F | DIASTOLIC BLOOD PRESSURE: 50 MMHG | RESPIRATION RATE: 12 BRPM | HEART RATE: 85 BPM | OXYGEN SATURATION: 94 %

## 2018-02-01 VITALS
HEART RATE: 139 BPM | TEMPERATURE: 97.8 F | RESPIRATION RATE: 16 BRPM | SYSTOLIC BLOOD PRESSURE: 91 MMHG | DIASTOLIC BLOOD PRESSURE: 51 MMHG | OXYGEN SATURATION: 97 %

## 2018-02-01 VITALS — HEART RATE: 99 BPM

## 2018-02-01 VITALS
RESPIRATION RATE: 13 BRPM | DIASTOLIC BLOOD PRESSURE: 55 MMHG | OXYGEN SATURATION: 96 % | SYSTOLIC BLOOD PRESSURE: 92 MMHG | HEART RATE: 71 BPM | TEMPERATURE: 98 F

## 2018-02-01 VITALS
RESPIRATION RATE: 19 BRPM | OXYGEN SATURATION: 95 % | SYSTOLIC BLOOD PRESSURE: 104 MMHG | TEMPERATURE: 97.6 F | HEART RATE: 110 BPM | DIASTOLIC BLOOD PRESSURE: 54 MMHG

## 2018-02-01 VITALS
SYSTOLIC BLOOD PRESSURE: 94 MMHG | OXYGEN SATURATION: 92 % | DIASTOLIC BLOOD PRESSURE: 62 MMHG | HEART RATE: 105 BPM | RESPIRATION RATE: 11 BRPM | TEMPERATURE: 97.3 F

## 2018-02-01 VITALS — HEART RATE: 76 BPM

## 2018-02-01 VITALS — HEART RATE: 118 BPM

## 2018-02-01 VITALS
RESPIRATION RATE: 18 BRPM | DIASTOLIC BLOOD PRESSURE: 52 MMHG | HEART RATE: 101 BPM | TEMPERATURE: 97.9 F | SYSTOLIC BLOOD PRESSURE: 91 MMHG | OXYGEN SATURATION: 99 %

## 2018-02-01 VITALS — HEART RATE: 90 BPM

## 2018-02-01 VITALS — HEART RATE: 102 BPM

## 2018-02-01 VITALS — OXYGEN SATURATION: 100 %

## 2018-02-01 VITALS — HEART RATE: 96 BPM

## 2018-02-01 LAB
ALBUMIN SERPL-MCNC: 1.8 GM/DL (ref 3.4–5)
BASOPHILS # BLD AUTO: 0 TH/MM3 (ref 0–0.2)
BASOPHILS NFR BLD: 0.2 % (ref 0–2)
BUN SERPL-MCNC: 27 MG/DL (ref 7–18)
CALCIUM SERPL-MCNC: 6.9 MG/DL (ref 8.5–10.1)
CHLORIDE SERPL-SCNC: 99 MEQ/L (ref 98–107)
CREAT SERPL-MCNC: 3.15 MG/DL (ref 0.6–1.3)
EOSINOPHIL # BLD: 0 TH/MM3 (ref 0–0.4)
EOSINOPHIL NFR BLD: 0.4 % (ref 0–4)
ERYTHROCYTE [DISTWIDTH] IN BLOOD BY AUTOMATED COUNT: 21.6 % (ref 11.6–17.2)
GFR SERPLBLD BASED ON 1.73 SQ M-ARVRAT: 20 ML/MIN (ref 89–?)
GLUCOSE SERPL-MCNC: 144 MG/DL (ref 74–106)
HCO3 BLD-SCNC: 29.6 MEQ/L (ref 21–32)
HCT VFR BLD CALC: 25.3 % (ref 39–51)
HGB BLD-MCNC: 8.2 GM/DL (ref 13–17)
LYMPHOCYTES # BLD AUTO: 0.4 TH/MM3 (ref 1–4.8)
LYMPHOCYTES NFR BLD AUTO: 4 % (ref 9–44)
MAGNESIUM SERPL-MCNC: 1.9 MG/DL (ref 1.5–2.5)
MCH RBC QN AUTO: 32.8 PG (ref 27–34)
MCHC RBC AUTO-ENTMCNC: 32.6 % (ref 32–36)
MCV RBC AUTO: 100.4 FL (ref 80–100)
MONOCYTE #: 0.4 TH/MM3 (ref 0–0.9)
MONOCYTES NFR BLD: 4.6 % (ref 0–8)
NEUTROPHILS # BLD AUTO: 8.4 TH/MM3 (ref 1.8–7.7)
NEUTROPHILS NFR BLD AUTO: 90.8 % (ref 16–70)
PHOSPHATE SERPL-MCNC: 3 MG/DL (ref 2.5–4.9)
PLATELET # BLD: 155 TH/MM3 (ref 150–450)
PMV BLD AUTO: 8 FL (ref 7–11)
RBC # BLD AUTO: 2.52 MIL/MM3 (ref 4.5–5.9)
SODIUM SERPL-SCNC: 138 MEQ/L (ref 136–145)
TROPONIN I SERPL-MCNC: 0.08 NG/ML (ref 0.02–0.05)
WBC # BLD AUTO: 9.3 TH/MM3 (ref 4–11)

## 2018-02-01 RX ADMIN — FLUCONAZOLE SCH MLS/HR: 2 INJECTION, SOLUTION INTRAVENOUS at 18:32

## 2018-02-01 RX ADMIN — Medication SCH ML: at 21:45

## 2018-02-01 RX ADMIN — Medication SCH ML: at 09:38

## 2018-02-01 RX ADMIN — HEPARIN SODIUM SCH UNITS: 10000 INJECTION, SOLUTION INTRAVENOUS; SUBCUTANEOUS at 09:36

## 2018-02-01 RX ADMIN — PHENYLEPHRINE HYDROCHLORIDE PRN MLS/HR: 10 INJECTION INTRAVENOUS at 12:27

## 2018-02-01 RX ADMIN — HUMAN INSULIN SCH: 100 INJECTION, SOLUTION SUBCUTANEOUS at 17:00

## 2018-02-01 RX ADMIN — PANTOPRAZOLE SCH MG: 40 TABLET, DELAYED RELEASE ORAL at 09:36

## 2018-02-01 RX ADMIN — HUMAN INSULIN SCH: 100 INJECTION, SOLUTION SUBCUTANEOUS at 08:00

## 2018-02-01 RX ADMIN — HEPARIN SODIUM SCH UNITS: 10000 INJECTION, SOLUTION INTRAVENOUS; SUBCUTANEOUS at 21:45

## 2018-02-01 RX ADMIN — MIDODRINE HYDROCHLORIDE SCH MG: 5 TABLET ORAL at 12:09

## 2018-02-01 RX ADMIN — CALCIUM CARBONATE (ANTACID) CHEW TAB 500 MG SCH MG: 500 CHEW TAB at 21:45

## 2018-02-01 RX ADMIN — CALCIUM CARBONATE (ANTACID) CHEW TAB 500 MG SCH MG: 500 CHEW TAB at 09:36

## 2018-02-01 RX ADMIN — MIDODRINE HYDROCHLORIDE SCH MG: 5 TABLET ORAL at 06:02

## 2018-02-01 RX ADMIN — STANDARDIZED SENNA CONCENTRATE AND DOCUSATE SODIUM SCH TAB: 8.6; 5 TABLET, FILM COATED ORAL at 19:46

## 2018-02-01 RX ADMIN — HUMAN INSULIN SCH: 100 INJECTION, SOLUTION SUBCUTANEOUS at 20:28

## 2018-02-01 RX ADMIN — HUMAN INSULIN SCH: 100 INJECTION, SOLUTION SUBCUTANEOUS at 12:00

## 2018-02-01 RX ADMIN — MIDODRINE HYDROCHLORIDE SCH MG: 5 TABLET ORAL at 18:32

## 2018-02-01 RX ADMIN — STANDARDIZED SENNA CONCENTRATE AND DOCUSATE SODIUM SCH TAB: 8.6; 5 TABLET, FILM COATED ORAL at 09:00

## 2018-02-01 RX ADMIN — NOREPINEPHRINE BITARTRATE PRN MLS/HR: 1 INJECTION INTRAVENOUS at 04:43

## 2018-02-01 NOTE — HHI.CCPN
Subjective


Remarks/Hospital Course


67 year-old male with a medical history significant for end-stage renal disease 

on peritoneal dialysis who was noted to be severely hypotensive and lethargic 

at the dialysis center on 1/25 and 911 was called and patient was transferred 

to [facility] ER.  On arrival he was noted to be hypotensive with SBP 70s 

and heart rate 100s.  He also reportedly had significant melena and rectal 

bleeding following arrival.  A right IJ central line was placed by ER 

physician.  Patient's wife arrived and wanted to make patient DNR status and 

did not want blood transfusions and wished transferring to hospice however 

subsequently patient woke up and wanted to continue aggressive care including 

blood transfusions.  ER physician contacted me questioning patient's wife's 

ability to make decisions for him due to concern for secondary gain as well as 

possible psychiatric illness.  Patient was accepted for admission by critical 

care medicine service.  4 units PRBCs were ordered to be transfused stat by ER 

physician.  When I arrived to see the patient he was laying in the ER stretcher 

on nasal cannula on 5 mics of Levophed.  He is drowsy but easily arousable.  He 

knew he was at the hospital.


He could not give me details of his history.  He did tell me that Dr. Hoang is 

his nephrologist.





1/27 Patient is on Levophed 2 mics, s/p HD yesterday. Awake and alert on 

Protonix drip. s/p EGD yesterday showed 2 large Duodenal ulcer, esophagitis and 

gastritis.


1/28:  Afebrile.  Currently off norepinephrine but MAP is around 60 currently.  

States he is okay with removing peritoneal dialysis catheter.  Will discuss 

with general surgery in nephrology in a.m.


1/29:  Blood pressures are borderline.  Awake and alert and eating popsicles 

currently.  Patient's okay with removing peroneal dialysis catheter.  Dr. Koch as yet to see patient today.  Agree with ID and nephrology's 

recommendations for removal.


1/30 Patient s/p PD catheter removal this morning. On Levophed 2 mics. Afebrile.


1/31:  Postoperative day #1 PD catheter removal.  Remains on norepinephrine at 

2 g per minute.  Asymptomatic.  Plan for hemodialysis today.





Subjective





2/1:  Afebrile.  Intermittently A. fib with RVR, currently rate controlled.  

Denies chest pain or shortness of breath.  Intermittently on phenylephrine drip





Objective





Vital Signs








  Date Time  Temp Pulse Resp B/P (MAP) Pulse Ox O2 Delivery O2 Flow Rate FiO2


 


2/1/18 18:00  90      


 


2/1/18 16:00 97.9  18 91/52 (65) 99   


 


2/1/18 08:04        21


 


1/31/18 23:09      Nasal Cannula  


 


1/31/18 07:00       2.00 














Intake and Output   


 


 2/1/18 2/1/18 2/1/18





 07:59 15:59 23:59


 


Intake Total 310 ml  100 ml


 


Output Total 0 ml  


 


Balance 310 ml  100 ml








Result Diagram:  


2/1/18 0400                                                                    

            2/1/18 0400





Other Results





Microbiology








 Date/Time


Source Procedure


Growth Status


 


 


 1/26/18 07:19


Blood Peripheral Aerobic Blood Culture - Final


NO GROWTH IN 5 DAYS Complete


 


 1/26/18 07:19


Blood Peripheral Anaerobic Blood Culture - Final


NO GROWTH IN 5 DAYS Complete





 1/26/18 15:20


Fluid Peritoneal Fluid Fungal Smear - Final


NO FUNGAL ELEMENTS SEEN. Resulted


 


 1/26/18 15:20 Fungal Culture - Preliminary


Candida Albicans Resulted








Imaging





Last Impressions








Chest X-Ray 1/27/18 0000 Signed





Impressions: 





 Service Date/Time:  Saturday, January 27, 2018 03:15 - CONCLUSION:  Patchy 





 consolidation or atelectasis of the left lower lung.     Chucky Morton MD 








Objective Remarks


GENERAL: 67 year  male resting in bed in no acute distress


SKIN: Woman dry.  No rash


HEAD: Atraumatic. Normocephalic. 


EYES:  No scleral icterus. No injection or drainage. 


ENT: No nasal bleeding or discharge.  Mucous membranes pink and dry..


NECK: Trachea midline.  Supple.


CARDIOVASCULAR: Heart rate in the 80s.  Irregularly irregular consistent with 

A. fib.


RESPIRATORY: No accessory muscle use. Clear to auscultation. Breath sounds 

equal bilaterally.  Decreased respiratory effort.


GASTROINTESTINAL: Abdomen soft, nondistended.  PD catheter has been removed.  

Site is clean dry and intact.  No signs erythema drainage.


MUSCULOSKELETAL: Patient has a well-healed rt heel ulcer.  Venous stasis 

changes noted to his bilateral lower extremities.


NEUROLOGICAL: Awake and weak and mildly confused. No obvious cranial nerve 

deficits.  Moves bilateral upper extremities.


BACK: Stage I/early decubitus in his sacral area.


Vascular Central Line Catheter:  Yes


Assessment to:  Continue


Date of Insertion:  Jan 25, 2018


Line:  Central Venous Catheter


Side:  Right


Location:  Internal, Jugular





A/P


Assessment and Plan


Neuro/Psych: 





Depression


Chronic Pain syndrome


History of CVA 1986/2007





Holding paroxetine 10 mg daily/home medication.  Resume when clinically 

indicated


Avoid sedatives and narcotics including tramadol/home medication, monitor neuro 

status. Awake and alert


Holding clopidogrel 75 mg daily.  Resume when okay with GI





CV: 





Atrial fibrillation rate controlled


History of essential hypertension





On norepinephrine at 2 g per minute- Monitor HR and BP keep MAP>65mmHg


Lactic acid 2.2 on 1/27


Holding clopidogrel 75 mg daily light of duodenal ulcer


Holding lisinopril 20 mill grams daily, Imdur and metoprolol 50 mill grams 

daily in light of hypotension


Check cortisol level.  20


Start Midodrine 5 mg 3 times a day








Pulm: 





Continue with oxygen keep sat >92%.  Currently on room air


Albuterol aerosols every 2 hours when necessary dyspnea





GI/liver: 





Upper GI bleed secondary to duodenal ulcer





Advance diet per GI.  Currently on soft renal diet


On pantoprazole 40 mg by mouth daily


s/p EGD 1/26 which showed 2 large duodenal ulcers, grade D esophagitis, 

gastritis.





Renal/: 





Status post PD catheter removal day #1 Dr. Juarez


End-stage renal disease on hemodialysis





Monitor renal function, I/O's, avoid nephrotoxins.


Pathology Dr. Hoang.  General Surgery is following- Dr. Juarez fungal 

infection requiring PD catheter removal on 1/30. 





Heme:





Macrocytic anemia





Monitor CBC, s/p transfusion 2units PRBC on 1/26





Endocrine: 





Diabetes mellitus


Elevated TSH - repeat in 3-6 weeks





SSI for glycemic control with low Novulog before meals at bedtime


Holding Tradjenta 5 mg daily





ID: 





Infected peritoneal dialysis catheter/peritonitis fungal





Continue fluconazole 200 mg IV daily) monitor for signs of infections ( Fever, 

WBC) follow up on cultures.  Peritoneal fluid 1/26 positive for yeast/Candida


ID is following.





MSK:  





Right heel ulcer


Sacral decubitus ulcer





Wound care evaluate and treat





Prophylaxis: Pantoprazole/heparin subcutaneous





Lines: Right IJ CVP placed 1/25





Level II follow-up











Chase Steen MD Feb 1, 2018 19:48

## 2018-02-01 NOTE — HHI.NPPN
Subjective


General Problems:  Anemia


Renal Failure:  End Stage Renal Disease


History of Present Illness


Patient is a very pleasant 67 year old male who came into the emergency room on 

01/26/18 hypotensive, lethargic, and with a hemoglobin of 6.7.  He was 

transfused 2 units of PRBC's with a recheck pending.  He was initially put on 

Levophed for blood pressure support however it is currently not running. Has a 

past medical history of End -stage renal disease with peritoneal dialysis, AFIB

, diabetes, arthritis, chronic pain, and hx of strokes.    Onset of acute 

epigastric pain and discomfort noted for 5 days ago, but worsened over the past 

24 hours.  Reported loose stools for 3 weeks.  In the emergency room patient 

did report melena, nausea, mild vomiting undigested food yesterday, but denied 

any coffee ground emesis. Patient has AV fistula and will have dialysis while 

here.  Per patient PD catheter needs to be removed secondary to infection.


Additional Remarks


Patient is resting comfortably.  Does report any problems or concerns. 

Continues on low dose pressors


 (Gellermann,Diane M. ARNP)





Review of Systems


General


Constitutional:  Fatigue


General Remarks


weakness


 (Gellermann,Diane M. ARNP)





Respiratory


Respiratory Remarks


Denies SOB


 (Gellermann,Diane M. ARNP)





Cardiovascular


Cardiac Remarks


Denies CP


 (Gellermann,Diane M. ARNP)





Gastrointestinal


GI Remarks


Denies abdominal pain


 (Gellermann,Diane M. ARNP)





Objective Data


Data





Vital Signs








  Date Time  Temp Pulse Resp B/P (MAP) Pulse Ox O2 Delivery O2 Flow Rate FiO2


 


2/1/18 08:04     100   21


 


2/1/18 08:04     100   21


 


2/1/18 08:00  105      


 


2/1/18 08:00 97.3 105 11 94/62 (73) 92   


 


2/1/18 06:00  102      


 


2/1/18 04:43  132  91/55    


 


2/1/18 04:42  132      


 


2/1/18 04:00  139      


 


2/1/18 04:00 97.8 139 16 91/51 (64) 97   


 


2/1/18 02:00  118      


 


2/1/18 00:00  110      


 


2/1/18 00:00 97.6 147 19 104/54 (71) 95   


 


1/31/18 23:09     97 Nasal Cannula  


 


1/31/18 22:00  101      


 


1/31/18 20:00 97.5 102 11 91/53 (66) 87   





    Arterial Line    


 


1/31/18 20:00  102      


 


1/31/18 19:00  104  91/57    


 


1/31/18 18:00 98.0 106 11 89/52 (64) 98   








 (Gellermann,Diane M. ARNP)


-:  


2/1/18 0400                                                                    

            2/1/18 0400








Physical Exam


General


Appearance:  Malnourished


 (Gellermann,Diane M. ARNP)





Neck


Neck Exam:  Neck Supple


 (Gellermann,Diane M. ARNP)





Pulmonary


Resp Exam:  Clear Bilaterally


 (Gellermann,Diane M. ARNP)





Cardiology


CV Exam:  Regular


 (Gellermann,Diane M. ARNP)





Gastrointestinal/Abdomen


GI Exam:  Soft


 (Gellermann,Diane M. ARNP)





Extremeties


Extremities Exam:  No Edema


 (Gellermann,Diane M. ARNP)





Assessment/Plan


Problem List:  


(1) ESRD (end stage renal disease) on dialysis


ICD Codes:  N18.6 - End stage renal disease; Z99.2 - Dependence on renal 

dialysis


Plan:  Dialysis MWF


Potassium and phos WNL


Calcuim glucanate given


Albumin with dialsis


PD Catheter removed.


Continue antibiotics as per ID.


BP is still low on low dose norepinephrine with SBP in the 90's midodrine has 

been added TID


Not much fluid removal with HD yesterday


Dialysis scheduled for tomorrow





(2) Peritonitis associated with peritoneal dialysis


ICD Codes:  T85.71XA - Infection and inflammatory reaction due to peritoneal 

dialysis catheter, initial encounter


Plan:  Peritoneal fluid culture positive for Candida. 


PD catheter removed


ID and surgery following. 





(3) Hypotension


ICD Codes:  I95.9 - Hypotension, unspecified


Status:  Acute


Plan:  Sepsis present on admission. 


Continue supportive care. 





(4) GI bleed


ICD Codes:  K92.2 - Gastrointestinal hemorrhage, unspecified


Status:  Acute


Plan:  s/p EGD: duodenal ulcer, gastritis, esophagitis. 


Received blood transfusion. Hemoglobin stable








(5) Anemia


ICD Codes:  D64.9 - Anemia, unspecified


Plan:  HGB stable at 8.2 procrit with dialysis


 (Gellermann,Diane M. ARNP)


Problem List:  


(1) ESRD (end stage renal disease) on dialysis


ICD Codes:  N18.6 - End stage renal disease; Z99.2 - Dependence on renal 

dialysis


Plan:  Dialysis MWF


Potassium and phos WNL


Calcium gluconate given


Albumin with dialysis


PD Catheter removed.


Continue antibiotics as per ID.


BP is still low on low dose norepinephrine with SBP in the 90's midodrine has 

been added TID


Not much fluid removal with HD yesterday


Dialysis scheduled for tomorrow.


Patient seen, examined and agree with above.


On Midodrine, seen by Palliative care.





(2) Peritonitis associated with peritoneal dialysis


ICD Codes:  T85.71XA - Infection and inflammatory reaction due to peritoneal 

dialysis catheter, initial encounter


Plan:  Peritoneal fluid culture positive for Candida. 


PD catheter removed


ID and surgery following. 





(3) Hypotension


ICD Codes:  I95.9 - Hypotension, unspecified


Status:  Acute


Plan:  Sepsis present on admission. 


Continue supportive care. 





(4) GI bleed


ICD Codes:  K92.2 - Gastrointestinal hemorrhage, unspecified


Status:  Acute


Plan:  s/p EGD: duodenal ulcer, gastritis, esophagitis. 


Received blood transfusion. Hemoglobin stable








(5) Anemia


ICD Codes:  D64.9 - Anemia, unspecified


Plan:  HGB stable at 8.2 procrit with dialysis


 (Carlita Hoang MD)





Problem Qualifiers





(1) Hypotension:  


Qualified Codes:  I95.9 - Hypotension, unspecified


(2) GI bleed:  


Qualified Codes:  K92.2 - Gastrointestinal hemorrhage, unspecified


(3) Anemia:  








Gellermann,Diane M. ARNP Feb 1, 2018 12:11


Carlita Hoang MD Feb 1, 2018 17:09

## 2018-02-01 NOTE — HHI.HCPN
Reason for visit


   a.  To assist with evaluation and management of symptoms including: weakness

, chronic pain 


   b.  To assist medical decision maker(s) with: better understanding of 

current medical conditions; weighing benefits/burdens of medical treatment 

options; making        


        medical treatment decisions.





Subjective/Interval History


Pt seen today to follow up on comfort, goals. 





s/p peritoneal dialysis catheter removal. Episode afib RVR earlier today, pt 

received cardizem, resolved, now rate 90s. Serial troponins pending; first at 

1200 0.08. H&H low but stable, cont to have several loose dark brown/reddish 

stools. On norepinephrine for hypotension. 





[dual visit w Delicia BOTELLO]





Pt seen in ICU room . He is initially sleeping, arouses easily. Once awake is 

oriented, however distant/flat affect. Answers orientation questions 

appropriately however is forgetful and word searches and is poor historian for 

today's events and medical treatments in place. He denies dyspnea. C/o rt knee 

pain, no other c/o pain. Unable to further describe pain- knee exam benign. He 

denies chest pain. D/w primary nurse, wife called her earlier today for update. 








.


Family/friend interactions


attempted to reach wife, unable leave VM





.





Advance Directives


Living Will:  Never completed


Health Care Surrogate:  Copy in medical record


Durable Power of :  Copy in medical record


Advance Directive Specifics


Date completed:


1/21/18


Health Care Surrogate(s):


Healthcare surrogate document [1/21/18] actually names the patient as 

healthcare surrogate. durable power of  does not include healthcare 

decision making.





Objective





Vital Signs








  Date Time  Temp Pulse Resp B/P (MAP) Pulse Ox O2 Delivery O2 Flow Rate FiO2


 


2/1/18 12:27  103  94/55    


 


2/1/18 08:04     100   21


 


2/1/18 08:04     100   21


 


2/1/18 08:00  105      


 


2/1/18 08:00 97.3 105 11 94/62 (73) 92   


 


2/1/18 06:00  102      


 


2/1/18 04:43  132  91/55    


 


2/1/18 04:42  132      


 


2/1/18 04:00  139      


 


2/1/18 04:00 97.8 139 16 91/51 (64) 97   


 


2/1/18 02:00  118      


 


2/1/18 00:00  110      


 


2/1/18 00:00 97.6 147 19 104/54 (71) 95   


 


1/31/18 23:09     97 Nasal Cannula  


 


1/31/18 22:00  101      


 


1/31/18 20:00 97.5 102 11 91/53 (66) 87   





    Arterial Line    


 


1/31/18 20:00  102      


 


1/31/18 19:00  104  91/57    


 


1/31/18 18:00 98.0 106 11 89/52 (64) 98   














Intake & Output  


 


 2/1/18 2/1/18





 07:00 19:00


 


Intake Total 310 ml 


 


Output Total 0 ml 


 


Balance 310 ml 


 


  


 


Intake Oral 60 ml 


 


IV Total 250 ml 


 


Output Urine Total 0 ml 








Physical Exam


CONSTITUTIONAL/GENERAL: Frail. Slight temporal wasting. In no apparent 

distress. Voice is hoarse. 


TUBES/LINES/DRAINS: Central line rt IJ.


SKIN: No jaundice. Diffuse pallor prominent in nail beds. Scattered petechia on 

upper extremities. Chronic venous insufficiency of lower extremities. Ulcers on 

plantar aspect of left foot and right great toe.  


NECK: Trachea midline. Supple, nontender. No palpable thyroid enlargement or 

nodularity. 


CARDIOVASCULAR: Irregular rate and irregular rhythm without murmur. observe 

afib on bedside monitor. rate 90s.  No JVD. Peripheral pulses symmetric.


RESPIRATORY/CHEST: Mild shortness of breath with conversation. Diffuse crackles 

bilaterally. Breath sounds equal bilaterally. No wheezes.   


GASTROINTESTINAL: Abdomen soft, non-tender, nondistended. No hepato-

splenomegaly. No guarding. dressing abdomen clean dry. +large amt dark red/

brown stool in bedding notified nurse 


GENITOURINARY: Without palpable bladder distension.


MUSCULOSKELETAL: Atrophy in bilateral lower extremities. Extremities without 

clubbing, cyanosis, or edema.+ chronic vascular skin changes BLE. No joint 

effusion, erythema or tenderness to exam bilateral knees 


NEUROLOGICAL: Awake and alert. Oriented x 3. very limited insight. + word 

searching. Motor and sensory grossly within normal limits. Follows commands. 

moves all 4 extremities. 


PSYCHIATRIC:. No obvious anxiety/depression. flat/distance affect 





.





Diagnostic Tests


Laboratory





Laboratory Tests








Test


  1/30/18


05:05 1/31/18


05:30 1/31/18


20:56 2/1/18


04:00


 


White Blood Count


  9.0 TH/MM3


(4.0-11.0) 8.7 TH/MM3


(4.0-11.0) 


  9.3 TH/MM3


(4.0-11.0)


 


Red Blood Count


  2.55 MIL/MM3


(4.50-5.90) 2.63 MIL/MM3


(4.50-5.90) 


  2.52 MIL/MM3


(4.50-5.90)


 


Hemoglobin


  8.6 GM/DL


(13.0-17.0) 8.6 GM/DL


(13.0-17.0) 


  8.2 GM/DL


(13.0-17.0)


 


Hematocrit


  25.3 %


(39.0-51.0) 26.0 %


(39.0-51.0) 


  25.3 %


(39.0-51.0)


 


Mean Corpuscular Volume


  99.0 FL


(80.0-100.0) 99.0 FL


(80.0-100.0) 


  100.4 FL


(80.0-100.0)


 


Mean Corpuscular Hemoglobin


  33.5 PG


(27.0-34.0) 32.9 PG


(27.0-34.0) 


  32.8 PG


(27.0-34.0)


 


Mean Corpuscular Hemoglobin


Concent 33.8 %


(32.0-36.0) 33.2 %


(32.0-36.0) 


  32.6 %


(32.0-36.0)


 


Red Cell Distribution Width


  19.3 %


(11.6-17.2) 21.0 %


(11.6-17.2) 


  21.6 %


(11.6-17.2)


 


Platelet Count


  118 TH/MM3


(150-450) 150 TH/MM3


(150-450) 


  155 TH/MM3


(150-450)


 


Mean Platelet Volume


  7.9 FL


(7.0-11.0) 8.1 FL


(7.0-11.0) 


  8.0 FL


(7.0-11.0)


 


Neutrophils (%) (Auto)


  91.0 %


(16.0-70.0) 89.8 %


(16.0-70.0) 


  90.8 %


(16.0-70.0)


 


Lymphocytes (%) (Auto)


  4.4 %


(9.0-44.0) 4.4 %


(9.0-44.0) 


  4.0 %


(9.0-44.0)


 


Monocytes (%) (Auto)


  3.6 %


(0.0-8.0) 5.5 %


(0.0-8.0) 


  4.6 %


(0.0-8.0)


 


Eosinophils (%) (Auto)


  0.6 %


(0.0-4.0) 0.2 %


(0.0-4.0) 


  0.4 %


(0.0-4.0)


 


Basophils (%) (Auto)


  0.4 %


(0.0-2.0) 0.1 %


(0.0-2.0) 


  0.2 %


(0.0-2.0)


 


Neutrophils # (Auto)


  8.2 TH/MM3


(1.8-7.7) 7.8 TH/MM3


(1.8-7.7) 


  8.4 TH/MM3


(1.8-7.7)


 


Lymphocytes # (Auto)


  0.4 TH/MM3


(1.0-4.8) 0.4 TH/MM3


(1.0-4.8) 


  0.4 TH/MM3


(1.0-4.8)


 


Monocytes # (Auto)


  0.3 TH/MM3


(0-0.9) 0.5 TH/MM3


(0-0.9) 


  0.4 TH/MM3


(0-0.9)


 


Eosinophils # (Auto)


  0.1 TH/MM3


(0-0.4) 0.0 TH/MM3


(0-0.4) 


  0.0 TH/MM3


(0-0.4)


 


Basophils # (Auto)


  0.0 TH/MM3


(0-0.2) 0.0 TH/MM3


(0-0.2) 


  0.0 TH/MM3


(0-0.2)


 


CBC Comment AUTO DIFF  DIFF FINAL   DIFF FINAL 


 


Differential Comment


  AUTO DIFF


CONFIRMED  


  


   


 


 


Platelet Estimate LOW (NORMAL)    


 


Platelet Morphology Comment


  NORMAL


(NORMAL) 


  


  


 


 


Basophilic Stippling FAINT (NORMAL)    


 


Blood Urea Nitrogen


  32 MG/DL


(7-18) 37 MG/DL


(7-18) 


  27 MG/DL


(7-18)


 


Creatinine


  3.77 MG/DL


(0.60-1.30) 4.38 MG/DL


(0.60-1.30) 


  3.15 MG/DL


(0.60-1.30)


 


Random Glucose


  70 MG/DL


() 134 MG/DL


() 


  144 MG/DL


()


 


Total Protein


  5.0 GM/DL


(6.4-8.2) 5.1 GM/DL


(6.4-8.2) 


  


 


 


Albumin


  2.0 GM/DL


(3.4-5.0) 


  


  1.8 GM/DL


(3.4-5.0)


 


Calcium Level


  7.0 MG/DL


(8.5-10.1) 7.0 MG/DL


(8.5-10.1) 


  6.9 MG/DL


(8.5-10.1)


 


Phosphorus Level


  2.9 MG/DL


(2.5-4.9) 


  


  3.0 MG/DL


(2.5-4.9)


 


Magnesium Level


  1.9 MG/DL


(1.5-2.5) 


  


  


 


 


Alkaline Phosphatase


  245 U/L


() 


  


  


 


 


Aspartate Amino Transf


(AST/SGOT) 12 U/L (15-37) 


  


  


  


 


 


Alanine Aminotransferase


(ALT/SGPT) 12 U/L (12-78) 


  


  


  


 


 


Total Bilirubin


  0.6 MG/DL


(0.2-1.0) 


  


  


 


 


Sodium Level


  137 MEQ/L


(136-145) 135 MEQ/L


(136-145) 


  138 MEQ/L


(136-145)


 


Potassium Level


  4.0 MEQ/L


(3.5-5.1) 4.3 MEQ/L


(3.5-5.1) 


  3.9 MEQ/L


(3.5-5.1)


 


Chloride Level


  99 MEQ/L


() 98 MEQ/L


() 


  99 MEQ/L


()


 


Carbon Dioxide Level


  30.7 MEQ/L


(21.0-32.0) 28.2 MEQ/L


(21.0-32.0) 


  29.6 MEQ/L


(21.0-32.0)


 


Anion Gap 7 MEQ/L (5-15)  9 MEQ/L (5-15)   9 MEQ/L (5-15) 


 


Estimat Glomerular Filtration


Rate 16 ML/MIN


(>89) 14 ML/MIN


(>89) 


  20 ML/MIN


(>89)


 


Protein Corrected Calcium


  8.1 MG/DL


(8.5-10.1) 8.1 MG/DL


(8.5-10.1) 


  


 


 


Random Cortisol   20.9 MCG/DL  


 


Test


  2/1/18


12:00 


  


  


 


 


Magnesium Level


  1.9 MG/DL


(1.5-2.5) 


  


  


 


 


Troponin I


  0.08 NG/ML


(0.02-0.05) 


  


  


 


 


Thyroid Stimulating Hormone


3rd Gen 4.190 uIU/ML


(0.358-3.740) 


  


  


 








Result Diagram:  


2/1/18 0400                                                                    

            2/1/18 0400





Imaging





Last Impressions








Chest X-Ray 1/27/18 0000 Signed





Impressions: 





 Service Date/Time:  Saturday, January 27, 2018 03:15 - CONCLUSION:  Patchy 





 consolidation or atelectasis of the left lower lung.     Chucky Morton MD 








Procedures


1/26/18 EGD





Assessment and Plan


Disease Oriented Problem List:  


(1) Atrial fibrillation


(2) Sacral decubitus ulcer


(3) Heel ulceration


(4) Hypotension


(5) GI bleed


(6) Anemia


(7) ESRD (end stage renal disease) on dialysis


Symptom Scale:  


(1) Weakness


0-10 Scale:  Unable to quantify





Pertinent Non-Medical Issues


Psychosocial: to his wife x 6 yrs. Has 1 adult son from prior marriage, 

who is incarcerated in [place]. He communicates with him every Sunday (son 

calls him). 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 His 

mother is still living there, and a sister. Moved to [place] in his 50s for 

correction. 


Spiritual:no particular affiliation, does not want  support


Legal:Patient has a healthcare surrogate document in his chart however this 

document actually names the patient as designated healthcare surrogate.  Per 

[place] statutes his wife would be appropriate legal proxy if patient 

incapacitated. 


1/26/18 assisted pt to complete new HCS, names his wife as HCS.


Ethical issues impacting care:


Important Contacts


spouse Yessy Meehan [phone]


.


Prognosis


This patient was admitted for hypotension, significant anemia, GI bleed.  He 

has known history of dialysis dependent end-stage renal disease.  Ongoing 

diagnostics, GI evaluation.  With ongoing aggressive treatments and 

interventions may be able to recover from current acute conditions.





.


Code Status:  Full Code


Plan





* Legal decision maker:Patient has a healthcare surrogate document in his chart 

however this document actually names the patient as designated healthcare 

surrogate.  Per [place] statutes his wife would be appropriate legal proxy if 

patient incapacitated. Pt at time of my exam is oriented, appropriate and 

appears to have fair insight to his conditions/options. Appears able to make 

his own decisions . 1/26/18 assisted pt to complete new HCS, names his wife as 

HCS.





* Pt reports multiple recent hospitalizations/ER visits at [facility] , 

recommend RECORDS REQUEST from [facility] 


   


* Goals: Pt goals aggressive short of resuscitation. 


   


* CODE STATUS: DNR 


   


* SYMPTOMS:


   --weakness/debility- ongoing since Dec 25th. Limited to no ambulation since 

that time. Rec cont OT/PT to maintain/improve functional status


   --pain- today pt reports Rt knee pain, exam benign. pt wife reports he has 

chronic neuropathic sounding pain to BLE [place] at night, and chronic pain to 

bilateral chronic foot wounds. He has been on norco 5mg at home.  Pt hypotensive

, requiring pressor, cautious use of opiates. Consider resuming home norco or 

tramadol PRN . 





* Palliative care will continue to follow during hospital course as condition 

evolves, to assist patient/decision-maker with understanding of medical 

conditions, weighing benefits/burdens of treatment options, for clarification 

of goals of treatment.  Additionally will assist with any symptoms of 

palliative concern





Attestation


To help prompt me to consider important information that might be impacting 

today's encounter and assessment, information from prior notes written by 

myself or my colleagues may have been "brought forward" into today's note.  My 

signature on this note, however, is an attestation that I personally performed 

the exam, history, and/or decision-making noted today, and, unless otherwise 

indicated, the interactions with patient, family, and staff as well as the 

review of records all occurred today.  I also attest that the listed assessment 

and stated plan reflect my best clinical judgment today based on the 

combination of historical information, prior notes, and today's exam/ 

interactions.  When time spent is documented, it refers only to time spent 

today by the signer, or if indicated, combined time spent today by 

collaborating physician/nurse practitioner.











Leandra Sparks Feb 1, 2018 14:34

## 2018-02-02 VITALS — RESPIRATION RATE: 23 BRPM | OXYGEN SATURATION: 88 % | HEART RATE: 91 BPM

## 2018-02-02 VITALS
SYSTOLIC BLOOD PRESSURE: 95 MMHG | OXYGEN SATURATION: 93 % | HEART RATE: 113 BPM | DIASTOLIC BLOOD PRESSURE: 60 MMHG | RESPIRATION RATE: 13 BRPM

## 2018-02-02 VITALS
OXYGEN SATURATION: 80 % | HEART RATE: 114 BPM | RESPIRATION RATE: 28 BRPM | DIASTOLIC BLOOD PRESSURE: 71 MMHG | SYSTOLIC BLOOD PRESSURE: 100 MMHG

## 2018-02-02 VITALS
OXYGEN SATURATION: 91 % | HEART RATE: 91 BPM | TEMPERATURE: 97.7 F | DIASTOLIC BLOOD PRESSURE: 40 MMHG | RESPIRATION RATE: 14 BRPM | SYSTOLIC BLOOD PRESSURE: 80 MMHG

## 2018-02-02 VITALS
OXYGEN SATURATION: 97 % | HEART RATE: 90 BPM | RESPIRATION RATE: 20 BRPM | DIASTOLIC BLOOD PRESSURE: 56 MMHG | SYSTOLIC BLOOD PRESSURE: 92 MMHG

## 2018-02-02 VITALS
DIASTOLIC BLOOD PRESSURE: 52 MMHG | TEMPERATURE: 97.8 F | HEART RATE: 98 BPM | RESPIRATION RATE: 12 BRPM | SYSTOLIC BLOOD PRESSURE: 94 MMHG

## 2018-02-02 VITALS
DIASTOLIC BLOOD PRESSURE: 55 MMHG | SYSTOLIC BLOOD PRESSURE: 103 MMHG | RESPIRATION RATE: 13 BRPM | OXYGEN SATURATION: 100 % | HEART RATE: 101 BPM | TEMPERATURE: 97.7 F

## 2018-02-02 VITALS
HEART RATE: 103 BPM | DIASTOLIC BLOOD PRESSURE: 57 MMHG | OXYGEN SATURATION: 94 % | RESPIRATION RATE: 13 BRPM | SYSTOLIC BLOOD PRESSURE: 96 MMHG

## 2018-02-02 VITALS
OXYGEN SATURATION: 94 % | HEART RATE: 118 BPM | DIASTOLIC BLOOD PRESSURE: 53 MMHG | SYSTOLIC BLOOD PRESSURE: 96 MMHG | RESPIRATION RATE: 13 BRPM

## 2018-02-02 VITALS
DIASTOLIC BLOOD PRESSURE: 51 MMHG | RESPIRATION RATE: 12 BRPM | TEMPERATURE: 97.3 F | SYSTOLIC BLOOD PRESSURE: 97 MMHG | OXYGEN SATURATION: 100 % | HEART RATE: 82 BPM

## 2018-02-02 VITALS
HEART RATE: 59 BPM | RESPIRATION RATE: 26 BRPM | SYSTOLIC BLOOD PRESSURE: 81 MMHG | DIASTOLIC BLOOD PRESSURE: 46 MMHG | OXYGEN SATURATION: 97 %

## 2018-02-02 VITALS
DIASTOLIC BLOOD PRESSURE: 60 MMHG | SYSTOLIC BLOOD PRESSURE: 97 MMHG | RESPIRATION RATE: 17 BRPM | OXYGEN SATURATION: 95 % | HEART RATE: 106 BPM

## 2018-02-02 VITALS
DIASTOLIC BLOOD PRESSURE: 55 MMHG | OXYGEN SATURATION: 97 % | SYSTOLIC BLOOD PRESSURE: 92 MMHG | HEART RATE: 83 BPM | RESPIRATION RATE: 10 BRPM

## 2018-02-02 VITALS
RESPIRATION RATE: 11 BRPM | DIASTOLIC BLOOD PRESSURE: 58 MMHG | SYSTOLIC BLOOD PRESSURE: 95 MMHG | HEART RATE: 116 BPM | OXYGEN SATURATION: 94 %

## 2018-02-02 VITALS
SYSTOLIC BLOOD PRESSURE: 104 MMHG | OXYGEN SATURATION: 88 % | DIASTOLIC BLOOD PRESSURE: 57 MMHG | RESPIRATION RATE: 18 BRPM | HEART RATE: 134 BPM

## 2018-02-02 VITALS
HEART RATE: 141 BPM | SYSTOLIC BLOOD PRESSURE: 99 MMHG | DIASTOLIC BLOOD PRESSURE: 62 MMHG | RESPIRATION RATE: 20 BRPM | OXYGEN SATURATION: 78 %

## 2018-02-02 VITALS
SYSTOLIC BLOOD PRESSURE: 84 MMHG | HEART RATE: 54 BPM | OXYGEN SATURATION: 95 % | DIASTOLIC BLOOD PRESSURE: 49 MMHG | RESPIRATION RATE: 17 BRPM

## 2018-02-02 VITALS — HEART RATE: 89 BPM

## 2018-02-02 VITALS — HEART RATE: 133 BPM | RESPIRATION RATE: 21 BRPM | TEMPERATURE: 97.8 F | OXYGEN SATURATION: 100 %

## 2018-02-02 VITALS
HEART RATE: 102 BPM | SYSTOLIC BLOOD PRESSURE: 99 MMHG | DIASTOLIC BLOOD PRESSURE: 57 MMHG | RESPIRATION RATE: 15 BRPM | OXYGEN SATURATION: 97 %

## 2018-02-02 VITALS
SYSTOLIC BLOOD PRESSURE: 71 MMHG | DIASTOLIC BLOOD PRESSURE: 51 MMHG | OXYGEN SATURATION: 83 % | HEART RATE: 69 BPM | RESPIRATION RATE: 16 BRPM

## 2018-02-02 VITALS
TEMPERATURE: 97.9 F | SYSTOLIC BLOOD PRESSURE: 98 MMHG | DIASTOLIC BLOOD PRESSURE: 54 MMHG | RESPIRATION RATE: 10 BRPM | HEART RATE: 87 BPM

## 2018-02-02 VITALS — HEART RATE: 90 BPM

## 2018-02-02 VITALS
RESPIRATION RATE: 14 BRPM | OXYGEN SATURATION: 97 % | SYSTOLIC BLOOD PRESSURE: 92 MMHG | HEART RATE: 113 BPM | DIASTOLIC BLOOD PRESSURE: 57 MMHG

## 2018-02-02 VITALS
HEART RATE: 84 BPM | OXYGEN SATURATION: 97 % | SYSTOLIC BLOOD PRESSURE: 86 MMHG | DIASTOLIC BLOOD PRESSURE: 49 MMHG | RESPIRATION RATE: 13 BRPM

## 2018-02-02 VITALS — DIASTOLIC BLOOD PRESSURE: 60 MMHG | RESPIRATION RATE: 18 BRPM | SYSTOLIC BLOOD PRESSURE: 85 MMHG | HEART RATE: 134 BPM

## 2018-02-02 VITALS
OXYGEN SATURATION: 88 % | HEART RATE: 113 BPM | DIASTOLIC BLOOD PRESSURE: 69 MMHG | RESPIRATION RATE: 15 BRPM | SYSTOLIC BLOOD PRESSURE: 101 MMHG

## 2018-02-02 VITALS — HEART RATE: 100 BPM

## 2018-02-02 VITALS — OXYGEN SATURATION: 94 %

## 2018-02-02 VITALS — HEART RATE: 82 BPM

## 2018-02-02 VITALS — HEART RATE: 104 BPM

## 2018-02-02 LAB
ALBUMIN SERPL-MCNC: 1.7 GM/DL (ref 3.4–5)
BASOPHILS # BLD AUTO: 0 TH/MM3 (ref 0–0.2)
BASOPHILS NFR BLD: 0.4 % (ref 0–2)
BUN SERPL-MCNC: 20 MG/DL (ref 7–18)
BUN SERPL-MCNC: 31 MG/DL (ref 7–18)
CALCIUM SERPL-MCNC: 7.2 MG/DL (ref 8.5–10.1)
CALCIUM SERPL-MCNC: 7.2 MG/DL (ref 8.5–10.1)
CALCIUM TP COR SERPL-MCNC: 8.3 MG/DL (ref 8.5–10.1)
CALCIUM TP COR SERPL-MCNC: 8.6 MG/DL (ref 8.5–10.1)
CHLORIDE SERPL-SCNC: 97 MEQ/L (ref 98–107)
CHLORIDE SERPL-SCNC: 99 MEQ/L (ref 98–107)
CREAT SERPL-MCNC: 3.03 MG/DL (ref 0.6–1.3)
CREAT SERPL-MCNC: 3.87 MG/DL (ref 0.6–1.3)
EOSINOPHIL # BLD: 0.1 TH/MM3 (ref 0–0.4)
EOSINOPHIL NFR BLD: 0.9 % (ref 0–4)
ERYTHROCYTE [DISTWIDTH] IN BLOOD BY AUTOMATED COUNT: 21.1 % (ref 11.6–17.2)
GFR SERPLBLD BASED ON 1.73 SQ M-ARVRAT: 16 ML/MIN (ref 89–?)
GFR SERPLBLD BASED ON 1.73 SQ M-ARVRAT: 21 ML/MIN (ref 89–?)
GLUCOSE SERPL-MCNC: 133 MG/DL (ref 74–106)
GLUCOSE SERPL-MCNC: 241 MG/DL (ref 74–106)
HCO3 BLD-SCNC: 30.1 MEQ/L (ref 21–32)
HCO3 BLD-SCNC: 32.3 MEQ/L (ref 21–32)
HCT VFR BLD CALC: 23.7 % (ref 39–51)
HGB BLD-MCNC: 8 GM/DL (ref 13–17)
LYMPHOCYTES # BLD AUTO: 0.4 TH/MM3 (ref 1–4.8)
LYMPHOCYTES NFR BLD AUTO: 4.9 % (ref 9–44)
MAGNESIUM SERPL-MCNC: 1.9 MG/DL (ref 1.5–2.5)
MCH RBC QN AUTO: 33.4 PG (ref 27–34)
MCHC RBC AUTO-ENTMCNC: 33.5 % (ref 32–36)
MCV RBC AUTO: 99.7 FL (ref 80–100)
MONOCYTE #: 0.5 TH/MM3 (ref 0–0.9)
MONOCYTES NFR BLD: 5.9 % (ref 0–8)
NEUTROPHILS # BLD AUTO: 7.6 TH/MM3 (ref 1.8–7.7)
NEUTROPHILS NFR BLD AUTO: 87.9 % (ref 16–70)
PHOSPHATE SERPL-MCNC: 3.1 MG/DL (ref 2.5–4.9)
PLATELET # BLD: 181 TH/MM3 (ref 150–450)
PMV BLD AUTO: 8.1 FL (ref 7–11)
PROT SERPL-MCNC: 4.6 GM/DL (ref 6.4–8.2)
PROT SERPL-MCNC: 5.1 GM/DL (ref 6.4–8.2)
RBC # BLD AUTO: 2.38 MIL/MM3 (ref 4.5–5.9)
SODIUM SERPL-SCNC: 136 MEQ/L (ref 136–145)
SODIUM SERPL-SCNC: 139 MEQ/L (ref 136–145)
WBC # BLD AUTO: 8.6 TH/MM3 (ref 4–11)

## 2018-02-02 RX ADMIN — CALCIUM CARBONATE (ANTACID) CHEW TAB 500 MG SCH MG: 500 CHEW TAB at 09:00

## 2018-02-02 RX ADMIN — PHENYLEPHRINE HYDROCHLORIDE PRN MLS/HR: 10 INJECTION INTRAVENOUS at 23:19

## 2018-02-02 RX ADMIN — PANTOPRAZOLE SCH MG: 40 TABLET, DELAYED RELEASE ORAL at 13:09

## 2018-02-02 RX ADMIN — Medication SCH ML: at 09:00

## 2018-02-02 RX ADMIN — GELATIN ABSORBABLE SPONGE 12-7 MM PRN FOAM: 12-7 MISC at 10:37

## 2018-02-02 RX ADMIN — HEPARIN SODIUM SCH UNITS: 10000 INJECTION, SOLUTION INTRAVENOUS; SUBCUTANEOUS at 21:27

## 2018-02-02 RX ADMIN — STANDARDIZED SENNA CONCENTRATE AND DOCUSATE SODIUM SCH TAB: 8.6; 5 TABLET, FILM COATED ORAL at 09:00

## 2018-02-02 RX ADMIN — STANDARDIZED SENNA CONCENTRATE AND DOCUSATE SODIUM SCH TAB: 8.6; 5 TABLET, FILM COATED ORAL at 21:27

## 2018-02-02 RX ADMIN — HEPARIN SODIUM SCH UNITS: 10000 INJECTION, SOLUTION INTRAVENOUS; SUBCUTANEOUS at 13:08

## 2018-02-02 RX ADMIN — Medication SCH ML: at 21:28

## 2018-02-02 RX ADMIN — HUMAN INSULIN SCH: 100 INJECTION, SOLUTION SUBCUTANEOUS at 17:00

## 2018-02-02 RX ADMIN — ALBUMIN HUMAN PRN MLS/HR: 0.25 SOLUTION INTRAVENOUS at 10:36

## 2018-02-02 RX ADMIN — MIDODRINE HYDROCHLORIDE SCH MG: 5 TABLET ORAL at 17:22

## 2018-02-02 RX ADMIN — HUMAN INSULIN SCH: 100 INJECTION, SOLUTION SUBCUTANEOUS at 08:00

## 2018-02-02 RX ADMIN — EPOETIN ALFA PRN UNITS: 10000 SOLUTION INTRAVENOUS; SUBCUTANEOUS at 10:37

## 2018-02-02 RX ADMIN — HUMAN INSULIN SCH: 100 INJECTION, SOLUTION SUBCUTANEOUS at 21:27

## 2018-02-02 RX ADMIN — CALCIUM CARBONATE (ANTACID) CHEW TAB 500 MG SCH MG: 500 CHEW TAB at 21:28

## 2018-02-02 RX ADMIN — HUMAN INSULIN SCH: 100 INJECTION, SOLUTION SUBCUTANEOUS at 12:00

## 2018-02-02 RX ADMIN — MIDODRINE HYDROCHLORIDE SCH MG: 5 TABLET ORAL at 05:56

## 2018-02-02 RX ADMIN — CHLORHEXIDINE GLUCONATE SCH PACK: 500 CLOTH TOPICAL at 00:18

## 2018-02-02 NOTE — HHI.IDPN
Note


Infectious Disease Note














ID coverage: For Dr Kamara. 


Notes reviewed. 





Patient without complaints. 


Just finished dialysis.


Afebrile. 








C. albicans in the peritoneal fluid. 


Post removal of PD catheter. 








Antibiotics


Fluconazole


Allergies:  


Coded Allergies:  


     morphine (Unverified  Allergy, Severe, anaphylactic, 1/25/18)





Objective








Vital Signs








  Date Time  Temp Pulse Resp B/P (MAP) Pulse Ox O2 Delivery O2 Flow Rate FiO2


 


2/2/18 08:00 97.9 87 10 98/54 (69)    


 


2/2/18 08:00  87      


 


2/2/18 06:00  82      


 


2/2/18 04:00  82      


 


2/2/18 04:00 97.3 82 12 97/51 (66) 100   


 


2/2/18 02:00  90      


 


2/2/18 00:15  83 10 92/55 (67) 97   


 


2/2/18 00:00  91      


 


2/2/18 00:00 97.7 91 14 80/40 (53) 91   


 


2/2/18 00:00  91  80/40    


 


2/1/18 22:00  96      


 


2/1/18 21:52     100   


 


2/1/18 20:00 97.5 85 12 94/50 (65) 94   


 


2/1/18 20:00  85      


 


2/1/18 18:00  90      


 


2/1/18 16:00  101      


 


2/1/18 16:00 97.9 101 18 91/52 (65) 99   


 


2/1/18 14:00  76      


 


2/1/18 12:27  103  94/55    











Laboratory Tests








Test


  2/1/18


04:00 2/2/18


04:00


 


White Blood Count 9.3 TH/MM3  8.6 TH/MM3 


 


Red Blood Count 2.52 MIL/MM3  2.38 MIL/MM3 


 


Hemoglobin 8.2 GM/DL  8.0 GM/DL 


 


Hematocrit 25.3 %  23.7 % 


 


Mean Corpuscular Volume 100.4 FL  99.7 FL 


 


Mean Corpuscular Hemoglobin 32.8 PG  33.4 PG 


 


Mean Corpuscular Hemoglobin


Concent 32.6 % 


  33.5 % 


 


 


Red Cell Distribution Width 21.6 %  21.1 % 


 


Platelet Count 155 TH/MM3  181 TH/MM3 


 


Mean Platelet Volume 8.0 FL  8.1 FL 


 


Neutrophils (%) (Auto) 90.8 %  87.9 % 


 


Lymphocytes (%) (Auto) 4.0 %  4.9 % 


 


Monocytes (%) (Auto) 4.6 %  5.9 % 


 


Eosinophils (%) (Auto) 0.4 %  0.9 % 


 


Basophils (%) (Auto) 0.2 %  0.4 % 


 


Neutrophils # (Auto) 8.4 TH/MM3  7.6 TH/MM3 


 


Lymphocytes # (Auto) 0.4 TH/MM3  0.4 TH/MM3 


 


Monocytes # (Auto) 0.4 TH/MM3  0.5 TH/MM3 


 


Eosinophils # (Auto) 0.0 TH/MM3  0.1 TH/MM3 


 


Basophils # (Auto) 0.0 TH/MM3  0.0 TH/MM3 


 


CBC Comment DIFF FINAL  DIFF FINAL 


 


Differential Comment    








Laboratory Tests








Test


  1/31/18


20:56 2/1/18


04:00 2/1/18


12:00 2/1/18


21:45


 


Random Cortisol 20.9 MCG/DL    


 


Blood Urea Nitrogen  27 MG/DL   


 


Creatinine  3.15 MG/DL   


 


Random Glucose  144 MG/DL   


 


Albumin  1.8 GM/DL   


 


Calcium Level  6.9 MG/DL   


 


Phosphorus Level  3.0 MG/DL   


 


Sodium Level  138 MEQ/L   


 


Potassium Level  3.9 MEQ/L   


 


Chloride Level  99 MEQ/L   


 


Carbon Dioxide Level  29.6 MEQ/L   


 


Anion Gap  9 MEQ/L   


 


Estimat Glomerular Filtration


Rate 


  20 ML/MIN 


  


  


 


 


Magnesium Level   1.9 MG/DL  


 


Troponin I   0.08 NG/ML  0.08 NG/ML 


 


Thyroid Stimulating Hormone


3rd Gen 


  


  4.190 uIU/ML 


  


 


 


Test


  2/2/18


04:00 


  


  


 


 


Blood Urea Nitrogen 31 MG/DL    


 


Creatinine 3.87 MG/DL    


 


Random Glucose 133 MG/DL    


 


Total Protein 4.6 GM/DL    


 


Albumin 1.7 GM/DL    


 


Calcium Level 7.2 MG/DL    


 


Phosphorus Level 3.1 MG/DL    


 


Sodium Level 136 MEQ/L    


 


Potassium Level 3.9 MEQ/L    


 


Chloride Level 97 MEQ/L    


 


Carbon Dioxide Level 30.1 MEQ/L    


 


Anion Gap 9 MEQ/L    


 


Estimat Glomerular Filtration


Rate 16 ML/MIN 


  


  


  


 


 


Protein Corrected Calcium 8.6 MG/DL    











Imaging


 











Chest X-Ray 1/27/18 0000 Signed





Impressions: 





 Service Date/Time:  Saturday, January 27, 2018 03:15 - CONCLUSION:  Patchy 





 consolidation or atelectasis of the left lower lung.     Chucky Morton MD 








Physical Exam





GENERAL:  Patient is in no acute distress.


HEENT:  Pupils constricted.  EOMI, No icterus.


NECK: Supple.


LUNGS:  Clear.


CARDIAC:  Regular rate and rhythm. 


ABDOMEN:  Soft, non tender.


EXTREMITIES:  No clubbing, cyanosis or edema. 


SKIN:  No rash.








Assessment and Plan


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


Sepis suspeced on presentation


sp GI bleed 





Change fluconazole to 400mg  PO. Treat until 2/13/18.  

















Pilo Hamlin MD Feb 2, 2018 12:03

## 2018-02-02 NOTE — HHI.CCPN
Subjective


Remarks/Hospital Course


67 year-old male with a medical history significant for end-stage renal disease 

on peritoneal dialysis who was noted to be severely hypotensive and lethargic 

at the dialysis center on 1/25 and 911 was called and patient was transferred 

to [facility] ER.  On arrival he was noted to be hypotensive with SBP 70s 

and heart rate 100s.  He also reportedly had significant melena and rectal 

bleeding following arrival.  A right IJ central line was placed by ER 

physician.  Patient's wife arrived and wanted to make patient DNR status and 

did not want blood transfusions and wished transferring to hospice however 

subsequently patient woke up and wanted to continue aggressive care including 

blood transfusions.  ER physician contacted me questioning patient's wife's 

ability to make decisions for him due to concern for secondary gain as well as 

possible psychiatric illness.  Patient was accepted for admission by critical 

care medicine service.  4 units PRBCs were ordered to be transfused stat by ER 

physician.  When I arrived to see the patient he was laying in the ER stretcher 

on nasal cannula on 5 mics of Levophed.  He is drowsy but easily arousable.  He 

knew he was at the hospital.


He could not give me details of his history.  He did tell me that Dr. Hoang is 

his nephrologist.





1/27 Patient is on Levophed 2 mics, s/p HD yesterday. Awake and alert on 

Protonix drip. s/p EGD yesterday showed 2 large Duodenal ulcer, esophagitis and 

gastritis.


1/28:  Afebrile.  Currently off norepinephrine but MAP is around 60 currently.  

States he is okay with removing peritoneal dialysis catheter.  Will discuss 

with general surgery in nephrology in a.m.


1/29:  Blood pressures are borderline.  Awake and alert and eating popsicles 

currently.  Patient's okay with removing peroneal dialysis catheter.  Dr. Koch as yet to see patient today.  Agree with ID and nephrology's 

recommendations for removal.


1/30 Patient s/p PD catheter removal this morning. On Levophed 2 mics. Afebrile.


1/31:  Postoperative day #1 PD catheter removal.  Remains on norepinephrine at 

2 g per minute.  Asymptomatic.  Plan for hemodialysis today.


2/1:  Afebrile.  Intermittently A. fib with RVR, currently rate controlled.  

Denies chest pain or shortness of breath.  Intermittently on phenylephrine drip





Subjective





2/2:  Resting in bed in no acute distress.  Remains on Johny-Synephrine drip at 

60 mics grams per minute.  Appears more depressed today.  Status post -3 L of 

hemodialysis.





Objective





Vital Signs








  Date Time  Temp Pulse Resp B/P (MAP) Pulse Ox O2 Delivery O2 Flow Rate FiO2


 


2/2/18 08:00 97.9 87 10 98/54 (69)    


 


2/2/18 04:00     100   


 


2/1/18 08:04        21


 


1/31/18 23:09      Nasal Cannula  


 


1/31/18 07:00       2.00 














Intake and Output   


 


 2/2/18 2/2/18 2/3/18





 08:00 16:00 00:00


 


Intake Total 360 ml  


 


Output Total  3000 ml 


 


Balance 360 ml -3000 ml 








Result Diagram:  


2/2/18 0400                                                                    

            2/2/18 0400





Other Results





Microbiology








 Date/Time


Source Procedure


Growth Status


 


 


 1/26/18 07:19


Blood Peripheral Aerobic Blood Culture - Final


NO GROWTH IN 5 DAYS Complete


 


 1/26/18 07:19


Blood Peripheral Anaerobic Blood Culture - Final


NO GROWTH IN 5 DAYS Complete





 1/26/18 15:20


Fluid Peritoneal Fluid Fungal Smear - Final


NO FUNGAL ELEMENTS SEEN. Resulted


 


 1/26/18 15:20 Fungal Culture - Preliminary


Candida Albicans Resulted








Imaging





Last Impressions








Chest X-Ray 1/27/18 0000 Signed





Impressions: 





 Service Date/Time:  Saturday, January 27, 2018 03:15 - CONCLUSION:  Patchy 





 consolidation or atelectasis of the left lower lung.     Chucky Morton MD 








Objective Remarks


GENERAL: 67 year  male resting in bed in no acute distress


SKIN: Woman dry.  No rash


HEAD: Atraumatic. Normocephalic. 


EYES:  No scleral icterus. No injection or drainage. 


ENT: No nasal bleeding or discharge.  Mucous membranes pink and dry..


NECK: Trachea midline.  Supple.


CARDIOVASCULAR: Heart rate in the 80s.  Irregularly irregular consistent with 

A. fib.


RESPIRATORY: No accessory muscle use. Clear to auscultation. Breath sounds 

equal bilaterally.  Decreased respiratory effort.


GASTROINTESTINAL: Abdomen soft, nondistended.  PD catheter has been removed.  

Site is clean dry and intact.  No signs erythema drainage.


MUSCULOSKELETAL: Patient has a well-healed rt heel ulcer.  Venous stasis 

changes noted to his bilateral lower extremities.


NEUROLOGICAL: Awake and weak and mildly confused. No obvious cranial nerve 

deficits.  Moves bilateral upper extremities.


BACK: Stage I/early decubitus in his sacral area.


Urinary Catheter:  No


Assessment to:  Continue


Vascular Central Line Catheter:  Yes


Assessment to:  Continue


Date of Insertion:  Jan 25, 2018


Line:  Central Venous Catheter


Side:  Right


Location:  Internal, Jugular





A/P


Assessment and Plan


Neuro/Psych: 





Depression


Chronic Pain syndrome


History of CVA 1986/2007





Holding paroxetine 10 mg daily/home medication.  Resume when clinically 

indicated


Avoid sedatives and narcotics including tramadol/home medication, monitor neuro 

status. Awake and alert


Holding clopidogrel 75 mg daily.  Resume when okay with GI





CV: 





Atrial fibrillation rate controlled


History of essential hypertension





On norepinephrine at 2 g per minute- Monitor HR and BP keep MAP>65mmHg


Lactic acid 2.2 on 1/27


Holding clopidogrel 75 mg daily light of duodenal ulcer


Holding lisinopril 20 mill grams daily, Imdur and metoprolol 50 mill grams 

daily in light of hypotension


Check cortisol level.  20


Start Midodrine 5 mg 3 times a day








Pulm: 





Continue with oxygen keep sat >92%.  Currently on room air


Albuterol aerosols every 2 hours when necessary dyspnea





GI/liver: 





Upper GI bleed secondary to duodenal ulcer





Currently on soft renal diet


On pantoprazole 40 mg by mouth daily


s/p EGD 1/26 which showed 2 large duodenal ulcers, grade D esophagitis, 

gastritis.





Renal/: 





Status post PD catheter removal day #1 Dr. Juarez


End-stage renal disease on hemodialysis





Monitor renal function, I/O's, avoid nephrotoxins.


Pathology Dr. Hoang.  General Surgery is following- Dr. Juarez fungal 

infection requiring PD catheter removal on 1/30. 





Heme:





Macrocytic anemia





Monitor CBC, s/p transfusion 2units PRBC on 1/26





Endocrine: 





Diabetes mellitus


Elevated TSH - repeat in 3-6 weeks





SSI for glycemic control with low Novulog before meals at bedtime


Holding Tradjenta 5 mg daily





ID: 





Infected peritoneal dialysis catheter/peritonitis fungal





Continue fluconazole 200 mg IV daily


Monitor for signs of infections ( Fever, WBC) follow up on cultures.  

Peritoneal fluid 1/26 positive for yeast/Candida


ID is following.





MSK:  





Right heel ulcer


Sacral decubitus ulcer





Wound care evaluate and treat





Prophylaxis: Pantoprazole/heparin subcutaneous





Lines: Right IJ CVP placed 1/25





Level II follow-up











Chase Steen MD Feb 2, 2018 15:28

## 2018-02-02 NOTE — HHI.NPPN
Subjective


General Problems:  Anemia


Renal Failure:  End Stage Renal Disease


History of Present Illness


Patient is a very pleasant 67 year old male who came into the emergency room on 

01/26/18 hypotensive, lethargic, and with a hemoglobin of 6.7.  He was 

transfused 2 units of PRBC's with a recheck pending.  He was initially put on 

Levophed for blood pressure support however it is currently not running. Has a 

past medical history of End -stage renal disease with peritoneal dialysis, AFIB

, diabetes, arthritis, chronic pain, and hx of strokes.    Onset of acute 

epigastric pain and discomfort noted for 5 days ago, but worsened over the past 

24 hours.  Reported loose stools for 3 weeks.  In the emergency room patient 

did report melena, nausea, mild vomiting undigested food yesterday, but denied 

any coffee ground emesis. Patient has AV fistula and will have dialysis while 

here.  Per patient PD catheter needs to be removed secondary to infection.


Additional Remarks


Patient is alert, off and on has confusion and hallucinations, still on low 

dose pressors, no SOB.





Review of Systems


General


Constitutional:  Fatigue


General Remarks


weakness





Respiratory


Respiratory Remarks


Denies SOB





Cardiovascular


Cardiac Remarks


Denies CP





Gastrointestinal


GI Remarks


Denies abdominal pain





Objective Data


Data











 2/2/18 2/3/18





 19:00 07:00


 


Output Total 3000 ml 


 


Balance -3000 ml 


 


  


 


Hemodialysis 3000 ml 











Vital Signs








  Date Time  Temp Pulse Resp B/P (MAP) Pulse Ox O2 Delivery O2 Flow Rate FiO2


 


2/2/18 08:00 97.9 87 10 98/54 (69)    


 


2/2/18 08:00  87      


 


2/2/18 06:00  82      


 


2/2/18 04:00  82      


 


2/2/18 04:00 97.3 82 12 97/51 (66) 100   


 


2/2/18 02:00  90      


 


2/2/18 00:15  83 10 92/55 (67) 97   


 


2/2/18 00:00  91      


 


2/2/18 00:00 97.7 91 14 80/40 (53) 91   


 


2/2/18 00:00  91  80/40    


 


2/1/18 22:00  96      


 


2/1/18 21:52     100   


 


2/1/18 20:00 97.5 85 12 94/50 (65) 94   


 


2/1/18 20:00  85      


 


2/1/18 18:00  90      


 


2/1/18 16:00  101      


 


2/1/18 16:00 97.9 101 18 91/52 (65) 99   


 


2/1/18 14:00  76      








-:  


2/2/18 0400                                                                    

            2/2/18 0400








Physical Exam


General


Appearance:  Malnourished





Neck


Neck Exam:  Neck Supple





Pulmonary


Resp Exam:  Clear Bilaterally





Cardiology


CV Exam:  Regular





Gastrointestinal/Abdomen


GI Exam:  Soft





Extremeties


Extremities Exam:  No Edema





Assessment/Plan


Problem List:  


(1) ESRD (end stage renal disease) on dialysis


ICD Codes:  N18.6 - End stage renal disease; Z99.2 - Dependence on renal 

dialysis


Plan:  Dialysis MWF


Potassium and phos WNL


Calcium gluconate given


Albumin with dialysis


PD Catheter removed.


Continue antibiotics as per ID.


BP is still low on low dose norepinephrine with SBP in the 90's midodrine has 

been added TID.


Dialysis done in AM, and 3 liters removed.


seen by Palliative care.


I will increase Midodrine.








(2) Peritonitis associated with peritoneal dialysis


ICD Codes:  T85.71XA - Infection and inflammatory reaction due to peritoneal 

dialysis catheter, initial encounter


Plan:  Peritoneal fluid culture positive for Candida. 


PD catheter removed


ID and surgery following. 





(3) Hypotension


ICD Codes:  I95.9 - Hypotension, unspecified


Status:  Acute


Plan:  Sepsis present on admission. 


Continue supportive care. 





(4) GI bleed


ICD Codes:  K92.2 - Gastrointestinal hemorrhage, unspecified


Status:  Acute


Plan:  s/p EGD: duodenal ulcer, gastritis, esophagitis. 


Received blood transfusion. Hemoglobin stable








(5) Anemia


ICD Codes:  D64.9 - Anemia, unspecified


Plan:  HGB stable at 8.2 procrit with dialysis








Problem Qualifiers





(1) Hypotension:  


Qualified Codes:  I95.9 - Hypotension, unspecified


(2) GI bleed:  


Qualified Codes:  K92.2 - Gastrointestinal hemorrhage, unspecified


(3) Anemia:  








Carlita Hoang MD Feb 2, 2018 12:56

## 2018-02-03 VITALS
SYSTOLIC BLOOD PRESSURE: 96 MMHG | HEART RATE: 92 BPM | DIASTOLIC BLOOD PRESSURE: 58 MMHG | OXYGEN SATURATION: 93 % | RESPIRATION RATE: 18 BRPM

## 2018-02-03 VITALS
OXYGEN SATURATION: 91 % | SYSTOLIC BLOOD PRESSURE: 108 MMHG | DIASTOLIC BLOOD PRESSURE: 61 MMHG | HEART RATE: 86 BPM | RESPIRATION RATE: 11 BRPM

## 2018-02-03 VITALS — DIASTOLIC BLOOD PRESSURE: 53 MMHG | HEART RATE: 81 BPM | RESPIRATION RATE: 15 BRPM | SYSTOLIC BLOOD PRESSURE: 89 MMHG

## 2018-02-03 VITALS
RESPIRATION RATE: 13 BRPM | DIASTOLIC BLOOD PRESSURE: 53 MMHG | OXYGEN SATURATION: 97 % | SYSTOLIC BLOOD PRESSURE: 108 MMHG | HEART RATE: 80 BPM

## 2018-02-03 VITALS — SYSTOLIC BLOOD PRESSURE: 92 MMHG | RESPIRATION RATE: 13 BRPM | DIASTOLIC BLOOD PRESSURE: 52 MMHG | HEART RATE: 74 BPM

## 2018-02-03 VITALS
RESPIRATION RATE: 26 BRPM | OXYGEN SATURATION: 95 % | SYSTOLIC BLOOD PRESSURE: 80 MMHG | HEART RATE: 85 BPM | DIASTOLIC BLOOD PRESSURE: 49 MMHG

## 2018-02-03 VITALS
RESPIRATION RATE: 18 BRPM | OXYGEN SATURATION: 92 % | HEART RATE: 92 BPM | DIASTOLIC BLOOD PRESSURE: 56 MMHG | SYSTOLIC BLOOD PRESSURE: 107 MMHG

## 2018-02-03 VITALS
OXYGEN SATURATION: 97 % | SYSTOLIC BLOOD PRESSURE: 107 MMHG | RESPIRATION RATE: 12 BRPM | HEART RATE: 91 BPM | DIASTOLIC BLOOD PRESSURE: 59 MMHG

## 2018-02-03 VITALS
DIASTOLIC BLOOD PRESSURE: 57 MMHG | OXYGEN SATURATION: 90 % | SYSTOLIC BLOOD PRESSURE: 99 MMHG | RESPIRATION RATE: 26 BRPM | HEART RATE: 90 BPM

## 2018-02-03 VITALS
DIASTOLIC BLOOD PRESSURE: 53 MMHG | HEART RATE: 75 BPM | OXYGEN SATURATION: 98 % | RESPIRATION RATE: 26 BRPM | SYSTOLIC BLOOD PRESSURE: 87 MMHG

## 2018-02-03 VITALS — DIASTOLIC BLOOD PRESSURE: 52 MMHG | HEART RATE: 73 BPM | SYSTOLIC BLOOD PRESSURE: 91 MMHG | RESPIRATION RATE: 14 BRPM

## 2018-02-03 VITALS
RESPIRATION RATE: 17 BRPM | HEART RATE: 85 BPM | SYSTOLIC BLOOD PRESSURE: 95 MMHG | DIASTOLIC BLOOD PRESSURE: 59 MMHG | OXYGEN SATURATION: 95 %

## 2018-02-03 VITALS
OXYGEN SATURATION: 96 % | RESPIRATION RATE: 13 BRPM | DIASTOLIC BLOOD PRESSURE: 57 MMHG | HEART RATE: 82 BPM | SYSTOLIC BLOOD PRESSURE: 99 MMHG

## 2018-02-03 VITALS
HEART RATE: 80 BPM | DIASTOLIC BLOOD PRESSURE: 57 MMHG | OXYGEN SATURATION: 93 % | RESPIRATION RATE: 16 BRPM | SYSTOLIC BLOOD PRESSURE: 89 MMHG

## 2018-02-03 VITALS
DIASTOLIC BLOOD PRESSURE: 56 MMHG | OXYGEN SATURATION: 94 % | SYSTOLIC BLOOD PRESSURE: 106 MMHG | RESPIRATION RATE: 12 BRPM | HEART RATE: 82 BPM

## 2018-02-03 VITALS
HEART RATE: 77 BPM | DIASTOLIC BLOOD PRESSURE: 51 MMHG | RESPIRATION RATE: 15 BRPM | OXYGEN SATURATION: 96 % | SYSTOLIC BLOOD PRESSURE: 100 MMHG

## 2018-02-03 VITALS
SYSTOLIC BLOOD PRESSURE: 90 MMHG | HEART RATE: 83 BPM | RESPIRATION RATE: 11 BRPM | OXYGEN SATURATION: 95 % | DIASTOLIC BLOOD PRESSURE: 54 MMHG

## 2018-02-03 VITALS
OXYGEN SATURATION: 94 % | SYSTOLIC BLOOD PRESSURE: 99 MMHG | DIASTOLIC BLOOD PRESSURE: 57 MMHG | RESPIRATION RATE: 13 BRPM | HEART RATE: 98 BPM

## 2018-02-03 VITALS
OXYGEN SATURATION: 93 % | RESPIRATION RATE: 12 BRPM | DIASTOLIC BLOOD PRESSURE: 51 MMHG | HEART RATE: 78 BPM | SYSTOLIC BLOOD PRESSURE: 98 MMHG

## 2018-02-03 VITALS
SYSTOLIC BLOOD PRESSURE: 104 MMHG | DIASTOLIC BLOOD PRESSURE: 52 MMHG | HEART RATE: 96 BPM | RESPIRATION RATE: 17 BRPM | OXYGEN SATURATION: 99 %

## 2018-02-03 VITALS
RESPIRATION RATE: 13 BRPM | OXYGEN SATURATION: 100 % | DIASTOLIC BLOOD PRESSURE: 54 MMHG | SYSTOLIC BLOOD PRESSURE: 105 MMHG | HEART RATE: 82 BPM

## 2018-02-03 VITALS
SYSTOLIC BLOOD PRESSURE: 90 MMHG | DIASTOLIC BLOOD PRESSURE: 50 MMHG | HEART RATE: 77 BPM | RESPIRATION RATE: 24 BRPM | OXYGEN SATURATION: 77 %

## 2018-02-03 VITALS
SYSTOLIC BLOOD PRESSURE: 95 MMHG | HEART RATE: 85 BPM | OXYGEN SATURATION: 96 % | DIASTOLIC BLOOD PRESSURE: 56 MMHG | RESPIRATION RATE: 14 BRPM

## 2018-02-03 VITALS
RESPIRATION RATE: 17 BRPM | SYSTOLIC BLOOD PRESSURE: 88 MMHG | OXYGEN SATURATION: 96 % | HEART RATE: 71 BPM | DIASTOLIC BLOOD PRESSURE: 53 MMHG

## 2018-02-03 VITALS
DIASTOLIC BLOOD PRESSURE: 52 MMHG | OXYGEN SATURATION: 97 % | HEART RATE: 80 BPM | RESPIRATION RATE: 13 BRPM | SYSTOLIC BLOOD PRESSURE: 87 MMHG

## 2018-02-03 VITALS
RESPIRATION RATE: 26 BRPM | OXYGEN SATURATION: 93 % | HEART RATE: 89 BPM | SYSTOLIC BLOOD PRESSURE: 93 MMHG | DIASTOLIC BLOOD PRESSURE: 53 MMHG

## 2018-02-03 VITALS
OXYGEN SATURATION: 94 % | HEART RATE: 91 BPM | RESPIRATION RATE: 38 BRPM | DIASTOLIC BLOOD PRESSURE: 53 MMHG | SYSTOLIC BLOOD PRESSURE: 125 MMHG

## 2018-02-03 VITALS
DIASTOLIC BLOOD PRESSURE: 57 MMHG | SYSTOLIC BLOOD PRESSURE: 99 MMHG | HEART RATE: 77 BPM | RESPIRATION RATE: 11 BRPM | OXYGEN SATURATION: 100 %

## 2018-02-03 VITALS
RESPIRATION RATE: 24 BRPM | SYSTOLIC BLOOD PRESSURE: 100 MMHG | OXYGEN SATURATION: 94 % | DIASTOLIC BLOOD PRESSURE: 56 MMHG | HEART RATE: 104 BPM

## 2018-02-03 VITALS — RESPIRATION RATE: 23 BRPM | DIASTOLIC BLOOD PRESSURE: 55 MMHG | HEART RATE: 79 BPM | SYSTOLIC BLOOD PRESSURE: 98 MMHG

## 2018-02-03 VITALS
RESPIRATION RATE: 10 BRPM | SYSTOLIC BLOOD PRESSURE: 99 MMHG | DIASTOLIC BLOOD PRESSURE: 53 MMHG | OXYGEN SATURATION: 93 % | HEART RATE: 80 BPM

## 2018-02-03 VITALS
RESPIRATION RATE: 16 BRPM | OXYGEN SATURATION: 77 % | DIASTOLIC BLOOD PRESSURE: 56 MMHG | SYSTOLIC BLOOD PRESSURE: 105 MMHG | HEART RATE: 76 BPM

## 2018-02-03 VITALS
DIASTOLIC BLOOD PRESSURE: 55 MMHG | OXYGEN SATURATION: 95 % | SYSTOLIC BLOOD PRESSURE: 108 MMHG | HEART RATE: 86 BPM | RESPIRATION RATE: 18 BRPM

## 2018-02-03 VITALS
DIASTOLIC BLOOD PRESSURE: 59 MMHG | SYSTOLIC BLOOD PRESSURE: 96 MMHG | RESPIRATION RATE: 14 BRPM | HEART RATE: 91 BPM | OXYGEN SATURATION: 95 %

## 2018-02-03 VITALS
HEART RATE: 80 BPM | RESPIRATION RATE: 13 BRPM | DIASTOLIC BLOOD PRESSURE: 59 MMHG | SYSTOLIC BLOOD PRESSURE: 105 MMHG | OXYGEN SATURATION: 99 %

## 2018-02-03 VITALS
SYSTOLIC BLOOD PRESSURE: 111 MMHG | OXYGEN SATURATION: 92 % | RESPIRATION RATE: 25 BRPM | HEART RATE: 104 BPM | DIASTOLIC BLOOD PRESSURE: 54 MMHG

## 2018-02-03 VITALS
HEART RATE: 105 BPM | OXYGEN SATURATION: 94 % | DIASTOLIC BLOOD PRESSURE: 67 MMHG | RESPIRATION RATE: 22 BRPM | SYSTOLIC BLOOD PRESSURE: 107 MMHG

## 2018-02-03 VITALS
HEART RATE: 79 BPM | DIASTOLIC BLOOD PRESSURE: 54 MMHG | RESPIRATION RATE: 16 BRPM | SYSTOLIC BLOOD PRESSURE: 92 MMHG | OXYGEN SATURATION: 97 %

## 2018-02-03 VITALS — RESPIRATION RATE: 17 BRPM | SYSTOLIC BLOOD PRESSURE: 94 MMHG | DIASTOLIC BLOOD PRESSURE: 52 MMHG | HEART RATE: 78 BPM

## 2018-02-03 VITALS
DIASTOLIC BLOOD PRESSURE: 51 MMHG | OXYGEN SATURATION: 97 % | SYSTOLIC BLOOD PRESSURE: 90 MMHG | HEART RATE: 81 BPM | RESPIRATION RATE: 12 BRPM

## 2018-02-03 VITALS
SYSTOLIC BLOOD PRESSURE: 98 MMHG | OXYGEN SATURATION: 96 % | RESPIRATION RATE: 12 BRPM | DIASTOLIC BLOOD PRESSURE: 50 MMHG | HEART RATE: 70 BPM

## 2018-02-03 VITALS
SYSTOLIC BLOOD PRESSURE: 98 MMHG | RESPIRATION RATE: 15 BRPM | DIASTOLIC BLOOD PRESSURE: 55 MMHG | OXYGEN SATURATION: 96 % | HEART RATE: 79 BPM

## 2018-02-03 VITALS
SYSTOLIC BLOOD PRESSURE: 89 MMHG | DIASTOLIC BLOOD PRESSURE: 51 MMHG | OXYGEN SATURATION: 84 % | RESPIRATION RATE: 25 BRPM | HEART RATE: 109 BPM

## 2018-02-03 VITALS
RESPIRATION RATE: 14 BRPM | HEART RATE: 83 BPM | SYSTOLIC BLOOD PRESSURE: 96 MMHG | TEMPERATURE: 98 F | DIASTOLIC BLOOD PRESSURE: 52 MMHG | OXYGEN SATURATION: 97 %

## 2018-02-03 VITALS
OXYGEN SATURATION: 94 % | RESPIRATION RATE: 21 BRPM | HEART RATE: 89 BPM | DIASTOLIC BLOOD PRESSURE: 51 MMHG | SYSTOLIC BLOOD PRESSURE: 99 MMHG

## 2018-02-03 VITALS
HEART RATE: 83 BPM | SYSTOLIC BLOOD PRESSURE: 110 MMHG | DIASTOLIC BLOOD PRESSURE: 48 MMHG | OXYGEN SATURATION: 95 % | RESPIRATION RATE: 11 BRPM

## 2018-02-03 VITALS
RESPIRATION RATE: 17 BRPM | HEART RATE: 87 BPM | DIASTOLIC BLOOD PRESSURE: 51 MMHG | SYSTOLIC BLOOD PRESSURE: 110 MMHG | OXYGEN SATURATION: 96 %

## 2018-02-03 VITALS
TEMPERATURE: 97.8 F | HEART RATE: 99 BPM | SYSTOLIC BLOOD PRESSURE: 109 MMHG | OXYGEN SATURATION: 88 % | DIASTOLIC BLOOD PRESSURE: 60 MMHG | RESPIRATION RATE: 20 BRPM

## 2018-02-03 VITALS
DIASTOLIC BLOOD PRESSURE: 57 MMHG | HEART RATE: 75 BPM | RESPIRATION RATE: 12 BRPM | OXYGEN SATURATION: 97 % | SYSTOLIC BLOOD PRESSURE: 107 MMHG

## 2018-02-03 VITALS — SYSTOLIC BLOOD PRESSURE: 91 MMHG | DIASTOLIC BLOOD PRESSURE: 52 MMHG | RESPIRATION RATE: 15 BRPM | HEART RATE: 79 BPM

## 2018-02-03 VITALS
HEART RATE: 62 BPM | OXYGEN SATURATION: 100 % | DIASTOLIC BLOOD PRESSURE: 48 MMHG | RESPIRATION RATE: 17 BRPM | SYSTOLIC BLOOD PRESSURE: 80 MMHG | TEMPERATURE: 97.9 F

## 2018-02-03 VITALS — HEART RATE: 77 BPM

## 2018-02-03 VITALS — SYSTOLIC BLOOD PRESSURE: 91 MMHG | RESPIRATION RATE: 14 BRPM | DIASTOLIC BLOOD PRESSURE: 50 MMHG | HEART RATE: 72 BPM

## 2018-02-03 VITALS
DIASTOLIC BLOOD PRESSURE: 55 MMHG | HEART RATE: 69 BPM | RESPIRATION RATE: 11 BRPM | SYSTOLIC BLOOD PRESSURE: 102 MMHG | OXYGEN SATURATION: 99 %

## 2018-02-03 VITALS
RESPIRATION RATE: 13 BRPM | OXYGEN SATURATION: 99 % | SYSTOLIC BLOOD PRESSURE: 97 MMHG | HEART RATE: 80 BPM | DIASTOLIC BLOOD PRESSURE: 55 MMHG

## 2018-02-03 VITALS
SYSTOLIC BLOOD PRESSURE: 106 MMHG | OXYGEN SATURATION: 92 % | HEART RATE: 84 BPM | DIASTOLIC BLOOD PRESSURE: 62 MMHG | RESPIRATION RATE: 20 BRPM

## 2018-02-03 VITALS
HEART RATE: 80 BPM | OXYGEN SATURATION: 80 % | RESPIRATION RATE: 22 BRPM | SYSTOLIC BLOOD PRESSURE: 87 MMHG | DIASTOLIC BLOOD PRESSURE: 57 MMHG

## 2018-02-03 VITALS
HEART RATE: 85 BPM | OXYGEN SATURATION: 83 % | RESPIRATION RATE: 24 BRPM | DIASTOLIC BLOOD PRESSURE: 58 MMHG | SYSTOLIC BLOOD PRESSURE: 125 MMHG

## 2018-02-03 VITALS
DIASTOLIC BLOOD PRESSURE: 51 MMHG | HEART RATE: 85 BPM | SYSTOLIC BLOOD PRESSURE: 86 MMHG | RESPIRATION RATE: 14 BRPM | OXYGEN SATURATION: 91 %

## 2018-02-03 VITALS — RESPIRATION RATE: 13 BRPM | SYSTOLIC BLOOD PRESSURE: 95 MMHG | HEART RATE: 80 BPM | DIASTOLIC BLOOD PRESSURE: 51 MMHG

## 2018-02-03 VITALS
SYSTOLIC BLOOD PRESSURE: 106 MMHG | OXYGEN SATURATION: 83 % | DIASTOLIC BLOOD PRESSURE: 52 MMHG | RESPIRATION RATE: 11 BRPM | HEART RATE: 82 BPM

## 2018-02-03 VITALS
RESPIRATION RATE: 15 BRPM | DIASTOLIC BLOOD PRESSURE: 51 MMHG | HEART RATE: 79 BPM | SYSTOLIC BLOOD PRESSURE: 99 MMHG | OXYGEN SATURATION: 88 %

## 2018-02-03 VITALS
SYSTOLIC BLOOD PRESSURE: 104 MMHG | DIASTOLIC BLOOD PRESSURE: 53 MMHG | HEART RATE: 82 BPM | OXYGEN SATURATION: 98 % | RESPIRATION RATE: 13 BRPM

## 2018-02-03 VITALS
RESPIRATION RATE: 20 BRPM | OXYGEN SATURATION: 96 % | SYSTOLIC BLOOD PRESSURE: 94 MMHG | DIASTOLIC BLOOD PRESSURE: 64 MMHG | HEART RATE: 89 BPM

## 2018-02-03 VITALS
DIASTOLIC BLOOD PRESSURE: 54 MMHG | RESPIRATION RATE: 12 BRPM | OXYGEN SATURATION: 96 % | TEMPERATURE: 98.4 F | SYSTOLIC BLOOD PRESSURE: 102 MMHG | HEART RATE: 74 BPM

## 2018-02-03 VITALS
OXYGEN SATURATION: 95 % | RESPIRATION RATE: 14 BRPM | SYSTOLIC BLOOD PRESSURE: 90 MMHG | HEART RATE: 86 BPM | DIASTOLIC BLOOD PRESSURE: 55 MMHG

## 2018-02-03 VITALS
SYSTOLIC BLOOD PRESSURE: 111 MMHG | DIASTOLIC BLOOD PRESSURE: 58 MMHG | HEART RATE: 90 BPM | RESPIRATION RATE: 14 BRPM | OXYGEN SATURATION: 99 %

## 2018-02-03 VITALS
HEART RATE: 86 BPM | OXYGEN SATURATION: 98 % | DIASTOLIC BLOOD PRESSURE: 61 MMHG | TEMPERATURE: 98 F | SYSTOLIC BLOOD PRESSURE: 95 MMHG | RESPIRATION RATE: 12 BRPM

## 2018-02-03 VITALS
RESPIRATION RATE: 11 BRPM | OXYGEN SATURATION: 97 % | HEART RATE: 84 BPM | DIASTOLIC BLOOD PRESSURE: 50 MMHG | SYSTOLIC BLOOD PRESSURE: 95 MMHG

## 2018-02-03 VITALS
SYSTOLIC BLOOD PRESSURE: 97 MMHG | RESPIRATION RATE: 24 BRPM | HEART RATE: 103 BPM | OXYGEN SATURATION: 92 % | DIASTOLIC BLOOD PRESSURE: 56 MMHG

## 2018-02-03 VITALS
OXYGEN SATURATION: 91 % | DIASTOLIC BLOOD PRESSURE: 50 MMHG | SYSTOLIC BLOOD PRESSURE: 94 MMHG | HEART RATE: 73 BPM | RESPIRATION RATE: 11 BRPM

## 2018-02-03 VITALS — DIASTOLIC BLOOD PRESSURE: 57 MMHG | HEART RATE: 68 BPM | RESPIRATION RATE: 13 BRPM | SYSTOLIC BLOOD PRESSURE: 108 MMHG

## 2018-02-03 VITALS
OXYGEN SATURATION: 98 % | DIASTOLIC BLOOD PRESSURE: 55 MMHG | SYSTOLIC BLOOD PRESSURE: 92 MMHG | RESPIRATION RATE: 23 BRPM | HEART RATE: 73 BPM

## 2018-02-03 VITALS
OXYGEN SATURATION: 97 % | RESPIRATION RATE: 15 BRPM | DIASTOLIC BLOOD PRESSURE: 58 MMHG | HEART RATE: 88 BPM | SYSTOLIC BLOOD PRESSURE: 100 MMHG

## 2018-02-03 VITALS
RESPIRATION RATE: 16 BRPM | DIASTOLIC BLOOD PRESSURE: 58 MMHG | OXYGEN SATURATION: 96 % | SYSTOLIC BLOOD PRESSURE: 102 MMHG | HEART RATE: 95 BPM

## 2018-02-03 VITALS
DIASTOLIC BLOOD PRESSURE: 50 MMHG | TEMPERATURE: 98.4 F | RESPIRATION RATE: 13 BRPM | HEART RATE: 89 BPM | OXYGEN SATURATION: 86 % | SYSTOLIC BLOOD PRESSURE: 91 MMHG

## 2018-02-03 VITALS — OXYGEN SATURATION: 95 %

## 2018-02-03 VITALS
DIASTOLIC BLOOD PRESSURE: 57 MMHG | OXYGEN SATURATION: 95 % | HEART RATE: 84 BPM | SYSTOLIC BLOOD PRESSURE: 110 MMHG | RESPIRATION RATE: 20 BRPM

## 2018-02-03 VITALS
DIASTOLIC BLOOD PRESSURE: 53 MMHG | OXYGEN SATURATION: 95 % | SYSTOLIC BLOOD PRESSURE: 100 MMHG | HEART RATE: 88 BPM | RESPIRATION RATE: 16 BRPM

## 2018-02-03 VITALS — DIASTOLIC BLOOD PRESSURE: 60 MMHG | HEART RATE: 106 BPM | SYSTOLIC BLOOD PRESSURE: 99 MMHG | RESPIRATION RATE: 14 BRPM

## 2018-02-03 VITALS — OXYGEN SATURATION: 97 %

## 2018-02-03 LAB
ALBUMIN SERPL-MCNC: 2.1 GM/DL (ref 3.4–5)
ALP SERPL-CCNC: 213 U/L (ref 45–117)
ALT SERPL-CCNC: 11 U/L (ref 12–78)
AST SERPL-CCNC: 17 U/L (ref 15–37)
BASOPHILS # BLD AUTO: 0 TH/MM3 (ref 0–0.2)
BASOPHILS NFR BLD: 0.6 % (ref 0–2)
BILIRUB SERPL-MCNC: 0.6 MG/DL (ref 0.2–1)
BUN SERPL-MCNC: 22 MG/DL (ref 7–18)
CALCIUM SERPL-MCNC: 6.9 MG/DL (ref 8.5–10.1)
CALCIUM TP COR SERPL-MCNC: 8.1 MG/DL (ref 8.5–10.1)
CHLORIDE SERPL-SCNC: 102 MEQ/L (ref 98–107)
CREAT SERPL-MCNC: 3.11 MG/DL (ref 0.6–1.3)
EOSINOPHIL # BLD: 0.1 TH/MM3 (ref 0–0.4)
EOSINOPHIL NFR BLD: 0.9 % (ref 0–4)
ERYTHROCYTE [DISTWIDTH] IN BLOOD BY AUTOMATED COUNT: 22.3 % (ref 11.6–17.2)
GFR SERPLBLD BASED ON 1.73 SQ M-ARVRAT: 20 ML/MIN (ref 89–?)
GLUCOSE SERPL-MCNC: 86 MG/DL (ref 74–106)
HCO3 BLD-SCNC: 32 MEQ/L (ref 21–32)
HCT VFR BLD CALC: 22.8 % (ref 39–51)
HGB BLD-MCNC: 7.4 GM/DL (ref 13–17)
LYMPHOCYTES # BLD AUTO: 0.5 TH/MM3 (ref 1–4.8)
LYMPHOCYTES NFR BLD AUTO: 7.8 % (ref 9–44)
MCH RBC QN AUTO: 32.9 PG (ref 27–34)
MCHC RBC AUTO-ENTMCNC: 32.6 % (ref 32–36)
MCV RBC AUTO: 101 FL (ref 80–100)
MONOCYTE #: 0.6 TH/MM3 (ref 0–0.9)
MONOCYTES NFR BLD: 8.6 % (ref 0–8)
NEUTROPHILS # BLD AUTO: 5.7 TH/MM3 (ref 1.8–7.7)
NEUTROPHILS NFR BLD AUTO: 82.1 % (ref 16–70)
PLATELET # BLD: 177 TH/MM3 (ref 150–450)
PMV BLD AUTO: 8 FL (ref 7–11)
PROT SERPL-MCNC: 4.9 GM/DL (ref 6.4–8.2)
RBC # BLD AUTO: 2.26 MIL/MM3 (ref 4.5–5.9)
SODIUM SERPL-SCNC: 141 MEQ/L (ref 136–145)
T3FREE SERPL-MCNC: 1.24 PG/ML (ref 2.18–3.98)
T4 FREE SERPL-MCNC: 1.07 NG/DL (ref 0.76–1.46)
WBC # BLD AUTO: 7 TH/MM3 (ref 4–11)

## 2018-02-03 RX ADMIN — HUMAN INSULIN SCH: 100 INJECTION, SOLUTION SUBCUTANEOUS at 21:00

## 2018-02-03 RX ADMIN — Medication SCH ML: at 19:33

## 2018-02-03 RX ADMIN — HUMAN INSULIN SCH: 100 INJECTION, SOLUTION SUBCUTANEOUS at 12:00

## 2018-02-03 RX ADMIN — HUMAN INSULIN SCH: 100 INJECTION, SOLUTION SUBCUTANEOUS at 08:00

## 2018-02-03 RX ADMIN — HEPARIN SODIUM SCH UNITS: 10000 INJECTION, SOLUTION INTRAVENOUS; SUBCUTANEOUS at 09:41

## 2018-02-03 RX ADMIN — Medication SCH ML: at 08:08

## 2018-02-03 RX ADMIN — PANTOPRAZOLE SCH MG: 40 TABLET, DELAYED RELEASE ORAL at 08:03

## 2018-02-03 RX ADMIN — STANDARDIZED SENNA CONCENTRATE AND DOCUSATE SODIUM SCH TAB: 8.6; 5 TABLET, FILM COATED ORAL at 19:33

## 2018-02-03 RX ADMIN — HEPARIN SODIUM SCH UNITS: 10000 INJECTION, SOLUTION INTRAVENOUS; SUBCUTANEOUS at 19:33

## 2018-02-03 RX ADMIN — MIDODRINE HYDROCHLORIDE SCH MG: 5 TABLET ORAL at 17:26

## 2018-02-03 RX ADMIN — MIDODRINE HYDROCHLORIDE SCH MG: 5 TABLET ORAL at 12:23

## 2018-02-03 RX ADMIN — CALCIUM CARBONATE (ANTACID) CHEW TAB 500 MG SCH MG: 500 CHEW TAB at 08:03

## 2018-02-03 RX ADMIN — CALCIUM CARBONATE (ANTACID) CHEW TAB 500 MG SCH MG: 500 CHEW TAB at 19:33

## 2018-02-03 RX ADMIN — STANDARDIZED SENNA CONCENTRATE AND DOCUSATE SODIUM SCH TAB: 8.6; 5 TABLET, FILM COATED ORAL at 08:06

## 2018-02-03 RX ADMIN — MIDODRINE HYDROCHLORIDE SCH MG: 5 TABLET ORAL at 06:13

## 2018-02-03 RX ADMIN — FLUCONAZOLE SCH MG: 200 TABLET ORAL at 08:03

## 2018-02-03 RX ADMIN — CHLORHEXIDINE GLUCONATE SCH PACK: 500 CLOTH TOPICAL at 04:00

## 2018-02-03 RX ADMIN — HUMAN INSULIN SCH: 100 INJECTION, SOLUTION SUBCUTANEOUS at 17:26

## 2018-02-03 NOTE — HHI.CCPN
Subjective


Remarks/Hospital Course


67 year-old male with a medical history significant for end-stage renal disease 

on peritoneal dialysis who was noted to be severely hypotensive and lethargic 

at the dialysis center on 1/25 and 911 was called and patient was transferred 

to [facility] ER.  On arrival he was noted to be hypotensive with SBP 70s 

and heart rate 100s.  He also reportedly had significant melena and rectal 

bleeding following arrival.  A right IJ central line was placed by ER 

physician.  Patient's wife arrived and wanted to make patient DNR status and 

did not want blood transfusions and wished transferring to hospice however 

subsequently patient woke up and wanted to continue aggressive care including 

blood transfusions.  ER physician contacted me questioning patient's wife's 

ability to make decisions for him due to concern for secondary gain as well as 

possible psychiatric illness.  Patient was accepted for admission by critical 

care medicine service.  4 units PRBCs were ordered to be transfused stat by ER 

physician.  When I arrived to see the patient he was laying in the ER stretcher 

on nasal cannula on 5 mics of Levophed.  He is drowsy but easily arousable.  He 

knew he was at the hospital.


He could not give me details of his history.  He did tell me that Dr. Hoang is 

his nephrologist.





1/27 Patient is on Levophed 2 mics, s/p HD yesterday. Awake and alert on 

Protonix drip. s/p EGD yesterday showed 2 large Duodenal ulcer, esophagitis and 

gastritis.


1/28:  Afebrile.  Currently off norepinephrine but MAP is around 60 currently.  

States he is okay with removing peritoneal dialysis catheter.  Will discuss 

with general surgery in nephrology in a.m.


1/29:  Blood pressures are borderline.  Awake and alert and eating popsicles 

currently.  Patient's okay with removing peroneal dialysis catheter.  Dr. Koch as yet to see patient today.  Agree with ID and nephrology's 

recommendations for removal.


1/30 Patient s/p PD catheter removal this morning. On Levophed 2 mics. Afebrile.


1/31:  Postoperative day #1 PD catheter removal.  Remains on norepinephrine at 

2 g per minute.  Asymptomatic.  Plan for hemodialysis today.


2/1:  Afebrile.  Intermittently A. fib with RVR, currently rate controlled.  

Denies chest pain or shortness of breath.  Intermittently on phenylephrine drip


2/2:  Resting in bed in no acute distress.  Remains on Johny-Synephrine drip at 

60 mics grams per minute.  Appears more depressed today.  Status post -3 L of 

hemodialysis.





Subjective





2/3:  Resting in bed in no acute distress.  Afebrile.  Remains on low-dose 

phenylephrine at 50 mics grams per minute.  More attentive today.





Objective





Vital Signs








  Date Time  Temp Pulse Resp B/P (MAP) Pulse Ox O2 Delivery O2 Flow Rate FiO2


 


2/3/18 13:00  82 13 105/54 (71) 100   


 


2/3/18 12:00 98.4       


 


2/1/18 08:04        21


 


1/31/18 23:09      Nasal Cannula  


 


1/31/18 07:00       2.00 














Intake and Output   


 


 2/3/18 2/3/18 2/4/18





 08:00 16:00 00:00


 


Intake Total 100 ml  


 


Output Total 25 ml  


 


Balance 75 ml  








Result Diagram:  


2/3/18 0303                                                                    

            2/3/18 0303





Other Results





Microbiology








 Date/Time


Source Procedure


Growth Status


 


 


 1/26/18 07:19


Blood Peripheral Aerobic Blood Culture - Final


NO GROWTH IN 5 DAYS Complete


 


 1/26/18 07:19


Blood Peripheral Anaerobic Blood Culture - Final


NO GROWTH IN 5 DAYS Complete





 1/26/18 15:20


Fluid Peritoneal Fluid Fungal Smear - Final


NO FUNGAL ELEMENTS SEEN. Resulted


 


 1/26/18 15:20 Fungal Culture - Preliminary


Candida Albicans Resulted








Imaging





Last Impressions








Chest X-Ray 1/27/18 0000 Signed





Impressions: 





 Service Date/Time:  Saturday, January 27, 2018 03:15 - CONCLUSION:  Patchy 





 consolidation or atelectasis of the left lower lung.     Chucky Morton MD 








Objective Remarks


GENERAL: 67 year  male resting in bed in no acute distress


SKIN: Woman dry.  No rash


HEAD: Atraumatic. Normocephalic. 


EYES:  No scleral icterus. No injection or drainage. 


ENT: No nasal bleeding or discharge.  Mucous membranes pink and dry..


NECK: Trachea midline.  Supple.


CARDIOVASCULAR: Heart rate in the 80s.  Irregularly irregular consistent with 

A. fib.


RESPIRATORY: No accessory muscle use. Clear to auscultation. Breath sounds 

equal bilaterally.  Decreased respiratory effort.


GASTROINTESTINAL: Abdomen soft, nondistended.  PD catheter has been removed.  

Site is clean dry and intact.  No signs erythema drainage.


MUSCULOSKELETAL: Patient has a well-healed rt heel ulcer.  Venous stasis 

changes noted to his bilateral lower extremities.


NEUROLOGICAL: Awake and weak and mildly confused. No obvious cranial nerve 

deficits.  Moves bilateral upper extremities.


BACK: Stage I/early decubitus in his sacral area.


Vascular Central Line Catheter:  Yes


Assessment to:  Continue


Date of Insertion:  Jan 25, 2018


Line:  Central Venous Catheter


Side:  Right


Location:  Internal, Jugular





A/P


Assessment and Plan


Neuro/Psych: 





Depression


Chronic Pain syndrome


History of CVA 1986/2007





Holding paroxetine 10 mg daily/home medication.  Resume when clinically 

indicated


Avoid sedatives and narcotics including tramadol/home medication, monitor neuro 

status. Awake and alert


Holding clopidogrel 75 mg daily.  Resume when okay with GI





CV: 





Atrial fibrillation rate controlled


History of essential hypertension





On  phenylephrine at 50 g per minute- Monitor HR and BP keep MAP>65mmHg


Lactic acid 2.2 on 1/27


Holding clopidogrel 75 mg daily light of duodenal ulcer


Holding lisinopril 20 mill grams daily, Imdur and metoprolol 50 mill grams 

daily in light of hypotension


Check cortisol level.  20


Continue Midodrine 10 mg 3 times a day








Pulm: 





Continue with oxygen keep sat >92%.  Currently on room air


Albuterol aerosols every 2 hours when necessary dyspnea





GI/liver: 





Upper GI bleed secondary to duodenal ulcer





Currently on soft renal diet


On pantoprazole 40 mg by mouth daily


s/p EGD 1/26 which showed 2 large duodenal ulcers, grade D esophagitis, 

gastritis.





Renal/: 





Status post PD catheter removal day #4 Dr. Juarez


End-stage renal disease on hemodialysis





Monitor renal function, I/O's, avoid nephrotoxins.


Pathology Dr. Hoang.  General Surgery has followed- Dr. Juarez fungal 

infection requiring PD catheter removal on 1/30. 





Heme:





Macrocytic anemia





Monitor CBC, s/p transfusion 2units PRBC on 1/26





Endocrine: 





Diabetes mellitus


Elevated TSH - repeat in 3-6 weeks.  Free T4 normal.  Free T3 low.  Would not 

initiate any levothyroxine at this time in this acute clinical setting





SSI for glycemic control with low Novulog before meals at bedtime


Holding Tradjenta 5 mg daily





ID: 





Infected peritoneal dialysis catheter/peritonitis fungal





Continue fluconazole 200 mg po daily docusate 2/13


Monitor for signs of infections ( Fever, WBC) follow up on cultures.  

Peritoneal fluid 1/26 positive for yeast/Candida


ID is following.





MSK:  





Right heel ulcer


Sacral decubitus ulcer





Wound care evaluate and treat





Prophylaxis: Pantoprazole/heparin subcutaneous





Lines: Right IJ CVP placed 1/25





Level II follow-up











Chase Steen MD Feb 3, 2018 13:36

## 2018-02-03 NOTE — HHI.NPPN
Subjective


General Problems:  Anemia


Renal Failure:  End Stage Renal Disease


History of Present Illness


Patient is a very pleasant 67 year old male who came into the emergency room on 

01/26/18 hypotensive, lethargic, and with a hemoglobin of 6.7.  He was 

transfused 2 units of PRBC's with a recheck pending.  He was initially put on 

Levophed for blood pressure support however it is currently not running. Has a 

past medical history of End -stage renal disease with peritoneal dialysis, AFIB

, diabetes, arthritis, chronic pain, and hx of strokes.    Onset of acute 

epigastric pain and discomfort noted for 5 days ago, but worsened over the past 

24 hours.  Reported loose stools for 3 weeks.  In the emergency room patient 

did report melena, nausea, mild vomiting undigested food yesterday, but denied 

any coffee ground emesis. Patient has AV fistula and will have dialysis while 

here.  Per patient PD catheter needs to be removed secondary to infection.


Additional Remarks


Patient is alert, off and on has confusion and hallucinations, still on low 

dose pressors, no SOB.





Review of Systems


General


Constitutional:  Fatigue


General Remarks


weakness





Respiratory


Respiratory Remarks


Denies SOB





Cardiovascular


Cardiac Remarks


Denies CP





Gastrointestinal


GI Remarks


Denies abdominal pain





Objective Data


Data





Vital Signs








  Date Time  Temp Pulse Resp B/P (MAP) Pulse Ox O2 Delivery O2 Flow Rate FiO2


 


2/3/18 10:00  77      


 


2/3/18 08:30     99   


 


2/3/18 08:00  99      


 


2/3/18 08:00 97.8 99 20 109/60 (76) 88   


 


2/3/18 06:00  77      


 


2/3/18 04:52     95   


 


2/3/18 04:00 98.0 83 14 96/52 (67) 97   


 


2/3/18 04:00  83      


 


2/3/18 03:45  80 22 87/57 (67) 80   


 


2/3/18 03:30  79 15 91/52 (65)    


 


2/3/18 03:15  73 14 91/52 (65)    


 


2/3/18 03:00  78 17 94/52 (66)    


 


2/3/18 02:45  81 15 89/53 (65)    


 


2/3/18 02:30  80 13 95/51 (66)    


 


2/3/18 02:15  74 13 92/52 (65)    


 


2/3/18 02:00  75 14 91/50 (64)    


 


2/3/18 02:00  72      


 


2/3/18 01:45  80 16 89/57 (68) 93   


 


2/3/18 01:30  106 14 99/60 (73)    


 


2/3/18 01:15  79 23 98/55 (69)    


 


2/3/18 01:00  77 24 90/50 (63) 77   


 


2/3/18 00:45  75 26 87/53 (64) 98   


 


2/3/18 00:30  71 17 88/53 (65) 96   


 


2/3/18 00:15  73 23 92/55 (67) 98   


 


2/3/18 00:00 97.9 62 17 80/48 (59) 100   


 


2/3/18 00:00  62      


 


2/2/18 23:49  45  81/46    


 


2/2/18 23:45  59 26 81/46 (58) 97   


 


2/2/18 23:32  54 17 84/49 (61) 95   


 


2/2/18 23:30  69 16 71/51 (58) 83   


 


2/2/18 23:29  85  71/51    


 


2/2/18 23:19  87  86/49    


 


2/2/18 23:16  84 13 86/49 (61) 97   


 


2/2/18 23:15  91 23  88   


 


2/2/18 23:00  114 28 100/71 (81) 80   


 


2/2/18 22:45  90 20 92/56 (68) 97   


 


2/2/18 22:30  113 15 101/69 (80) 88   


 


2/2/18 22:15  106 17 97/60 (72) 95   


 


2/2/18 22:10  120  104/57    


 


2/2/18 22:00  134      


 


2/2/18 22:00  134 18 104/57 (73) 88   


 


2/2/18 21:45  102 15 99/57 (71) 97   


 


2/2/18 21:30  113 14 92/57 (69) 97   


 


2/2/18 21:28  118  96/57    


 


2/2/18 21:15  103 13 96/57 (70) 94   


 


2/2/18 21:00  118 13 96/53 (67) 94   


 


2/2/18 20:45  141 20 99/62 (74) 78   


 


2/2/18 20:35     94   


 


2/2/18 20:30  113 13 95/60 (72) 93   


 


2/2/18 20:15  116 11 95/58 (70) 94   


 


2/2/18 20:08  134 18 85/60 (68)    


 


2/2/18 20:00 97.8 133 21  100   


 


2/2/18 20:00  133      


 


2/2/18 19:00  103  93/55    


 


2/2/18 18:00  100      


 


2/2/18 16:00 97.7 101 13 103/55 (71) 100   


 


2/2/18 14:00  89      


 


2/2/18 12:00 97.8 98 12 94/52 (66)    


 


2/2/18 12:00  98      








-:  


2/3/18 0303                                                                    

            2/3/18 0303








Physical Exam


General


Appearance:  Malnourished





Neck


Neck Exam:  Neck Supple





Pulmonary


Resp Exam:  Clear Bilaterally





Cardiology


CV Exam:  Regular





Gastrointestinal/Abdomen


GI Exam:  Soft





Extremeties


Extremities Exam:  No Edema





Assessment/Plan


Problem List:  


(1) ESRD (end stage renal disease) on dialysis


ICD Codes:  N18.6 - End stage renal disease; Z99.2 - Dependence on renal 

dialysis


Plan:  Dialysis MWF


HD done yesterday 3 L off


stable


monitor


PD catheter removed





(2) Peritonitis associated with peritoneal dialysis


ICD Codes:  T85.71XA - Infection and inflammatory reaction due to peritoneal 

dialysis catheter, initial encounter


Plan:  Peritoneal fluid culture positive for Candida. 


PD catheter removed


ID and surgery following. 





(3) Hypotension


ICD Codes:  I95.9 - Hypotension, unspecified


Status:  Acute


Plan:  Sepsis present on admission. 


Continue supportive care. 





(4) GI bleed


ICD Codes:  K92.2 - Gastrointestinal hemorrhage, unspecified


Status:  Acute


Plan:  s/p EGD: duodenal ulcer, gastritis, esophagitis. 


Received blood transfusion. Hemoglobin stable








(5) Anemia


ICD Codes:  D64.9 - Anemia, unspecified


Plan:  HGB low








Problem Qualifiers





(1) Hypotension:  


Qualified Codes:  I95.9 - Hypotension, unspecified


(2) GI bleed:  


Qualified Codes:  K92.2 - Gastrointestinal hemorrhage, unspecified


(3) Anemia:  








Cydney Castellanos MD Feb 3, 2018 11:10

## 2018-02-04 VITALS
SYSTOLIC BLOOD PRESSURE: 101 MMHG | DIASTOLIC BLOOD PRESSURE: 59 MMHG | RESPIRATION RATE: 11 BRPM | HEART RATE: 83 BPM | OXYGEN SATURATION: 68 %

## 2018-02-04 VITALS
SYSTOLIC BLOOD PRESSURE: 112 MMHG | OXYGEN SATURATION: 84 % | DIASTOLIC BLOOD PRESSURE: 53 MMHG | RESPIRATION RATE: 19 BRPM | HEART RATE: 100 BPM

## 2018-02-04 VITALS
RESPIRATION RATE: 13 BRPM | HEART RATE: 87 BPM | SYSTOLIC BLOOD PRESSURE: 99 MMHG | DIASTOLIC BLOOD PRESSURE: 55 MMHG | OXYGEN SATURATION: 98 %

## 2018-02-04 VITALS
RESPIRATION RATE: 11 BRPM | DIASTOLIC BLOOD PRESSURE: 51 MMHG | HEART RATE: 77 BPM | OXYGEN SATURATION: 90 % | SYSTOLIC BLOOD PRESSURE: 90 MMHG

## 2018-02-04 VITALS — SYSTOLIC BLOOD PRESSURE: 97 MMHG | DIASTOLIC BLOOD PRESSURE: 58 MMHG | HEART RATE: 102 BPM | RESPIRATION RATE: 18 BRPM

## 2018-02-04 VITALS
SYSTOLIC BLOOD PRESSURE: 102 MMHG | OXYGEN SATURATION: 95 % | DIASTOLIC BLOOD PRESSURE: 57 MMHG | RESPIRATION RATE: 15 BRPM | HEART RATE: 82 BPM

## 2018-02-04 VITALS
RESPIRATION RATE: 14 BRPM | HEART RATE: 99 BPM | OXYGEN SATURATION: 99 % | SYSTOLIC BLOOD PRESSURE: 107 MMHG | DIASTOLIC BLOOD PRESSURE: 56 MMHG

## 2018-02-04 VITALS
RESPIRATION RATE: 13 BRPM | DIASTOLIC BLOOD PRESSURE: 56 MMHG | SYSTOLIC BLOOD PRESSURE: 103 MMHG | OXYGEN SATURATION: 97 % | HEART RATE: 86 BPM

## 2018-02-04 VITALS
OXYGEN SATURATION: 98 % | SYSTOLIC BLOOD PRESSURE: 97 MMHG | RESPIRATION RATE: 12 BRPM | HEART RATE: 85 BPM | DIASTOLIC BLOOD PRESSURE: 56 MMHG

## 2018-02-04 VITALS
SYSTOLIC BLOOD PRESSURE: 101 MMHG | RESPIRATION RATE: 13 BRPM | HEART RATE: 91 BPM | OXYGEN SATURATION: 91 % | DIASTOLIC BLOOD PRESSURE: 67 MMHG

## 2018-02-04 VITALS
OXYGEN SATURATION: 81 % | HEART RATE: 109 BPM | DIASTOLIC BLOOD PRESSURE: 70 MMHG | SYSTOLIC BLOOD PRESSURE: 99 MMHG | RESPIRATION RATE: 22 BRPM

## 2018-02-04 VITALS
RESPIRATION RATE: 13 BRPM | OXYGEN SATURATION: 95 % | HEART RATE: 90 BPM | DIASTOLIC BLOOD PRESSURE: 54 MMHG | SYSTOLIC BLOOD PRESSURE: 91 MMHG

## 2018-02-04 VITALS
HEART RATE: 83 BPM | RESPIRATION RATE: 27 BRPM | SYSTOLIC BLOOD PRESSURE: 87 MMHG | DIASTOLIC BLOOD PRESSURE: 51 MMHG | OXYGEN SATURATION: 79 %

## 2018-02-04 VITALS
RESPIRATION RATE: 18 BRPM | SYSTOLIC BLOOD PRESSURE: 98 MMHG | DIASTOLIC BLOOD PRESSURE: 58 MMHG | OXYGEN SATURATION: 80 % | HEART RATE: 93 BPM

## 2018-02-04 VITALS
RESPIRATION RATE: 13 BRPM | DIASTOLIC BLOOD PRESSURE: 52 MMHG | OXYGEN SATURATION: 95 % | HEART RATE: 83 BPM | SYSTOLIC BLOOD PRESSURE: 95 MMHG

## 2018-02-04 VITALS
OXYGEN SATURATION: 89 % | HEART RATE: 79 BPM | RESPIRATION RATE: 18 BRPM | SYSTOLIC BLOOD PRESSURE: 110 MMHG | DIASTOLIC BLOOD PRESSURE: 59 MMHG

## 2018-02-04 VITALS
DIASTOLIC BLOOD PRESSURE: 67 MMHG | RESPIRATION RATE: 13 BRPM | HEART RATE: 98 BPM | OXYGEN SATURATION: 93 % | SYSTOLIC BLOOD PRESSURE: 102 MMHG

## 2018-02-04 VITALS
OXYGEN SATURATION: 82 % | DIASTOLIC BLOOD PRESSURE: 58 MMHG | SYSTOLIC BLOOD PRESSURE: 98 MMHG | RESPIRATION RATE: 19 BRPM | HEART RATE: 102 BPM

## 2018-02-04 VITALS
RESPIRATION RATE: 17 BRPM | SYSTOLIC BLOOD PRESSURE: 84 MMHG | DIASTOLIC BLOOD PRESSURE: 58 MMHG | OXYGEN SATURATION: 86 % | HEART RATE: 84 BPM

## 2018-02-04 VITALS
RESPIRATION RATE: 12 BRPM | HEART RATE: 85 BPM | OXYGEN SATURATION: 87 % | DIASTOLIC BLOOD PRESSURE: 54 MMHG | SYSTOLIC BLOOD PRESSURE: 98 MMHG

## 2018-02-04 VITALS
SYSTOLIC BLOOD PRESSURE: 86 MMHG | OXYGEN SATURATION: 65 % | HEART RATE: 102 BPM | RESPIRATION RATE: 24 BRPM | DIASTOLIC BLOOD PRESSURE: 50 MMHG

## 2018-02-04 VITALS
TEMPERATURE: 98 F | SYSTOLIC BLOOD PRESSURE: 86 MMHG | OXYGEN SATURATION: 95 % | HEART RATE: 81 BPM | DIASTOLIC BLOOD PRESSURE: 47 MMHG | RESPIRATION RATE: 16 BRPM

## 2018-02-04 VITALS
OXYGEN SATURATION: 76 % | DIASTOLIC BLOOD PRESSURE: 68 MMHG | SYSTOLIC BLOOD PRESSURE: 92 MMHG | HEART RATE: 117 BPM | RESPIRATION RATE: 15 BRPM

## 2018-02-04 VITALS — SYSTOLIC BLOOD PRESSURE: 83 MMHG | RESPIRATION RATE: 12 BRPM | HEART RATE: 95 BPM | DIASTOLIC BLOOD PRESSURE: 47 MMHG

## 2018-02-04 VITALS
SYSTOLIC BLOOD PRESSURE: 95 MMHG | OXYGEN SATURATION: 95 % | RESPIRATION RATE: 12 BRPM | HEART RATE: 85 BPM | DIASTOLIC BLOOD PRESSURE: 52 MMHG

## 2018-02-04 VITALS
DIASTOLIC BLOOD PRESSURE: 65 MMHG | RESPIRATION RATE: 16 BRPM | OXYGEN SATURATION: 69 % | SYSTOLIC BLOOD PRESSURE: 101 MMHG | HEART RATE: 102 BPM

## 2018-02-04 VITALS
OXYGEN SATURATION: 97 % | DIASTOLIC BLOOD PRESSURE: 51 MMHG | SYSTOLIC BLOOD PRESSURE: 96 MMHG | HEART RATE: 86 BPM | RESPIRATION RATE: 12 BRPM

## 2018-02-04 VITALS
RESPIRATION RATE: 21 BRPM | SYSTOLIC BLOOD PRESSURE: 86 MMHG | OXYGEN SATURATION: 79 % | HEART RATE: 104 BPM | DIASTOLIC BLOOD PRESSURE: 61 MMHG

## 2018-02-04 VITALS
OXYGEN SATURATION: 100 % | HEART RATE: 82 BPM | SYSTOLIC BLOOD PRESSURE: 100 MMHG | DIASTOLIC BLOOD PRESSURE: 58 MMHG | RESPIRATION RATE: 14 BRPM

## 2018-02-04 VITALS
SYSTOLIC BLOOD PRESSURE: 105 MMHG | HEART RATE: 86 BPM | OXYGEN SATURATION: 93 % | RESPIRATION RATE: 15 BRPM | DIASTOLIC BLOOD PRESSURE: 59 MMHG

## 2018-02-04 VITALS
OXYGEN SATURATION: 95 % | SYSTOLIC BLOOD PRESSURE: 96 MMHG | HEART RATE: 93 BPM | DIASTOLIC BLOOD PRESSURE: 58 MMHG | RESPIRATION RATE: 12 BRPM | TEMPERATURE: 98.5 F

## 2018-02-04 VITALS — DIASTOLIC BLOOD PRESSURE: 60 MMHG | SYSTOLIC BLOOD PRESSURE: 101 MMHG | RESPIRATION RATE: 27 BRPM | HEART RATE: 115 BPM

## 2018-02-04 VITALS
RESPIRATION RATE: 10 BRPM | SYSTOLIC BLOOD PRESSURE: 94 MMHG | DIASTOLIC BLOOD PRESSURE: 55 MMHG | HEART RATE: 81 BPM | OXYGEN SATURATION: 99 %

## 2018-02-04 VITALS
SYSTOLIC BLOOD PRESSURE: 92 MMHG | DIASTOLIC BLOOD PRESSURE: 53 MMHG | RESPIRATION RATE: 17 BRPM | OXYGEN SATURATION: 83 % | HEART RATE: 91 BPM

## 2018-02-04 VITALS
HEART RATE: 101 BPM | OXYGEN SATURATION: 92 % | RESPIRATION RATE: 17 BRPM | SYSTOLIC BLOOD PRESSURE: 106 MMHG | DIASTOLIC BLOOD PRESSURE: 57 MMHG

## 2018-02-04 VITALS
DIASTOLIC BLOOD PRESSURE: 56 MMHG | HEART RATE: 108 BPM | OXYGEN SATURATION: 91 % | RESPIRATION RATE: 17 BRPM | SYSTOLIC BLOOD PRESSURE: 85 MMHG

## 2018-02-04 VITALS
RESPIRATION RATE: 16 BRPM | DIASTOLIC BLOOD PRESSURE: 54 MMHG | TEMPERATURE: 98.3 F | SYSTOLIC BLOOD PRESSURE: 83 MMHG | OXYGEN SATURATION: 95 % | HEART RATE: 105 BPM

## 2018-02-04 VITALS
SYSTOLIC BLOOD PRESSURE: 87 MMHG | DIASTOLIC BLOOD PRESSURE: 58 MMHG | OXYGEN SATURATION: 93 % | RESPIRATION RATE: 20 BRPM | HEART RATE: 110 BPM

## 2018-02-04 VITALS
SYSTOLIC BLOOD PRESSURE: 100 MMHG | DIASTOLIC BLOOD PRESSURE: 62 MMHG | OXYGEN SATURATION: 92 % | RESPIRATION RATE: 17 BRPM | HEART RATE: 93 BPM

## 2018-02-04 VITALS
RESPIRATION RATE: 17 BRPM | HEART RATE: 107 BPM | SYSTOLIC BLOOD PRESSURE: 104 MMHG | OXYGEN SATURATION: 94 % | DIASTOLIC BLOOD PRESSURE: 58 MMHG

## 2018-02-04 VITALS
OXYGEN SATURATION: 98 % | DIASTOLIC BLOOD PRESSURE: 52 MMHG | SYSTOLIC BLOOD PRESSURE: 95 MMHG | HEART RATE: 86 BPM | RESPIRATION RATE: 19 BRPM

## 2018-02-04 VITALS
SYSTOLIC BLOOD PRESSURE: 96 MMHG | OXYGEN SATURATION: 79 % | HEART RATE: 117 BPM | RESPIRATION RATE: 21 BRPM | DIASTOLIC BLOOD PRESSURE: 63 MMHG

## 2018-02-04 VITALS
RESPIRATION RATE: 16 BRPM | DIASTOLIC BLOOD PRESSURE: 55 MMHG | OXYGEN SATURATION: 95 % | HEART RATE: 86 BPM | SYSTOLIC BLOOD PRESSURE: 100 MMHG

## 2018-02-04 VITALS
OXYGEN SATURATION: 93 % | RESPIRATION RATE: 18 BRPM | SYSTOLIC BLOOD PRESSURE: 97 MMHG | HEART RATE: 82 BPM | DIASTOLIC BLOOD PRESSURE: 53 MMHG

## 2018-02-04 VITALS
RESPIRATION RATE: 20 BRPM | OXYGEN SATURATION: 90 % | DIASTOLIC BLOOD PRESSURE: 59 MMHG | SYSTOLIC BLOOD PRESSURE: 92 MMHG | HEART RATE: 99 BPM

## 2018-02-04 VITALS
DIASTOLIC BLOOD PRESSURE: 59 MMHG | OXYGEN SATURATION: 80 % | RESPIRATION RATE: 19 BRPM | TEMPERATURE: 98.1 F | HEART RATE: 90 BPM | SYSTOLIC BLOOD PRESSURE: 105 MMHG

## 2018-02-04 VITALS — HEART RATE: 114 BPM

## 2018-02-04 VITALS
OXYGEN SATURATION: 97 % | DIASTOLIC BLOOD PRESSURE: 50 MMHG | SYSTOLIC BLOOD PRESSURE: 92 MMHG | RESPIRATION RATE: 13 BRPM | HEART RATE: 86 BPM

## 2018-02-04 VITALS
SYSTOLIC BLOOD PRESSURE: 91 MMHG | OXYGEN SATURATION: 79 % | DIASTOLIC BLOOD PRESSURE: 69 MMHG | RESPIRATION RATE: 14 BRPM | HEART RATE: 91 BPM

## 2018-02-04 VITALS
DIASTOLIC BLOOD PRESSURE: 57 MMHG | OXYGEN SATURATION: 97 % | RESPIRATION RATE: 13 BRPM | SYSTOLIC BLOOD PRESSURE: 101 MMHG | HEART RATE: 87 BPM

## 2018-02-04 VITALS
DIASTOLIC BLOOD PRESSURE: 60 MMHG | SYSTOLIC BLOOD PRESSURE: 95 MMHG | HEART RATE: 86 BPM | RESPIRATION RATE: 13 BRPM | OXYGEN SATURATION: 87 %

## 2018-02-04 VITALS
RESPIRATION RATE: 21 BRPM | OXYGEN SATURATION: 69 % | HEART RATE: 92 BPM | DIASTOLIC BLOOD PRESSURE: 62 MMHG | SYSTOLIC BLOOD PRESSURE: 92 MMHG

## 2018-02-04 VITALS
SYSTOLIC BLOOD PRESSURE: 103 MMHG | HEART RATE: 92 BPM | OXYGEN SATURATION: 73 % | RESPIRATION RATE: 13 BRPM | DIASTOLIC BLOOD PRESSURE: 67 MMHG

## 2018-02-04 VITALS
OXYGEN SATURATION: 92 % | RESPIRATION RATE: 11 BRPM | SYSTOLIC BLOOD PRESSURE: 90 MMHG | HEART RATE: 116 BPM | DIASTOLIC BLOOD PRESSURE: 56 MMHG

## 2018-02-04 VITALS — DIASTOLIC BLOOD PRESSURE: 51 MMHG | HEART RATE: 94 BPM | SYSTOLIC BLOOD PRESSURE: 87 MMHG | RESPIRATION RATE: 17 BRPM

## 2018-02-04 VITALS
RESPIRATION RATE: 18 BRPM | HEART RATE: 105 BPM | OXYGEN SATURATION: 93 % | SYSTOLIC BLOOD PRESSURE: 100 MMHG | DIASTOLIC BLOOD PRESSURE: 71 MMHG

## 2018-02-04 VITALS
DIASTOLIC BLOOD PRESSURE: 61 MMHG | RESPIRATION RATE: 19 BRPM | SYSTOLIC BLOOD PRESSURE: 91 MMHG | OXYGEN SATURATION: 94 % | HEART RATE: 125 BPM

## 2018-02-04 VITALS
SYSTOLIC BLOOD PRESSURE: 94 MMHG | DIASTOLIC BLOOD PRESSURE: 52 MMHG | RESPIRATION RATE: 13 BRPM | HEART RATE: 83 BPM | OXYGEN SATURATION: 97 %

## 2018-02-04 VITALS
RESPIRATION RATE: 14 BRPM | OXYGEN SATURATION: 94 % | SYSTOLIC BLOOD PRESSURE: 98 MMHG | DIASTOLIC BLOOD PRESSURE: 55 MMHG | HEART RATE: 100 BPM

## 2018-02-04 VITALS
SYSTOLIC BLOOD PRESSURE: 91 MMHG | DIASTOLIC BLOOD PRESSURE: 54 MMHG | HEART RATE: 96 BPM | RESPIRATION RATE: 14 BRPM | OXYGEN SATURATION: 96 %

## 2018-02-04 VITALS — RESPIRATION RATE: 14 BRPM | SYSTOLIC BLOOD PRESSURE: 98 MMHG | DIASTOLIC BLOOD PRESSURE: 54 MMHG | HEART RATE: 91 BPM

## 2018-02-04 VITALS
DIASTOLIC BLOOD PRESSURE: 55 MMHG | SYSTOLIC BLOOD PRESSURE: 90 MMHG | HEART RATE: 91 BPM | OXYGEN SATURATION: 75 % | RESPIRATION RATE: 15 BRPM

## 2018-02-04 VITALS — HEART RATE: 115 BPM

## 2018-02-04 VITALS
DIASTOLIC BLOOD PRESSURE: 54 MMHG | HEART RATE: 83 BPM | SYSTOLIC BLOOD PRESSURE: 84 MMHG | RESPIRATION RATE: 14 BRPM | OXYGEN SATURATION: 93 %

## 2018-02-04 VITALS
RESPIRATION RATE: 22 BRPM | SYSTOLIC BLOOD PRESSURE: 100 MMHG | DIASTOLIC BLOOD PRESSURE: 59 MMHG | OXYGEN SATURATION: 67 % | HEART RATE: 106 BPM

## 2018-02-04 VITALS
RESPIRATION RATE: 9 BRPM | DIASTOLIC BLOOD PRESSURE: 56 MMHG | HEART RATE: 76 BPM | SYSTOLIC BLOOD PRESSURE: 110 MMHG | OXYGEN SATURATION: 98 %

## 2018-02-04 VITALS
OXYGEN SATURATION: 93 % | DIASTOLIC BLOOD PRESSURE: 66 MMHG | HEART RATE: 110 BPM | SYSTOLIC BLOOD PRESSURE: 104 MMHG | RESPIRATION RATE: 16 BRPM

## 2018-02-04 VITALS
HEART RATE: 109 BPM | RESPIRATION RATE: 20 BRPM | OXYGEN SATURATION: 72 % | SYSTOLIC BLOOD PRESSURE: 99 MMHG | DIASTOLIC BLOOD PRESSURE: 58 MMHG

## 2018-02-04 VITALS
DIASTOLIC BLOOD PRESSURE: 61 MMHG | RESPIRATION RATE: 25 BRPM | OXYGEN SATURATION: 74 % | SYSTOLIC BLOOD PRESSURE: 93 MMHG | HEART RATE: 102 BPM

## 2018-02-04 VITALS — DIASTOLIC BLOOD PRESSURE: 53 MMHG | SYSTOLIC BLOOD PRESSURE: 91 MMHG

## 2018-02-04 VITALS — HEART RATE: 94 BPM

## 2018-02-04 LAB
ALBUMIN SERPL-MCNC: 2.2 GM/DL (ref 3.4–5)
ALP SERPL-CCNC: 231 U/L (ref 45–117)
ALT SERPL-CCNC: 12 U/L (ref 12–78)
AST SERPL-CCNC: 15 U/L (ref 15–37)
BASOPHILS # BLD AUTO: 0 TH/MM3 (ref 0–0.2)
BASOPHILS NFR BLD: 0.7 % (ref 0–2)
BILIRUB SERPL-MCNC: 0.7 MG/DL (ref 0.2–1)
BUN SERPL-MCNC: 26 MG/DL (ref 7–18)
CALCIUM SERPL-MCNC: 7.1 MG/DL (ref 8.5–10.1)
CALCIUM TP COR SERPL-MCNC: 8.3 MG/DL (ref 8.5–10.1)
CHLORIDE SERPL-SCNC: 100 MEQ/L (ref 98–107)
CREAT SERPL-MCNC: 3.9 MG/DL (ref 0.6–1.3)
EOSINOPHIL # BLD: 0 TH/MM3 (ref 0–0.4)
EOSINOPHIL NFR BLD: 0.6 % (ref 0–4)
ERYTHROCYTE [DISTWIDTH] IN BLOOD BY AUTOMATED COUNT: 21.9 % (ref 11.6–17.2)
GFR SERPLBLD BASED ON 1.73 SQ M-ARVRAT: 16 ML/MIN (ref 89–?)
GLUCOSE SERPL-MCNC: 141 MG/DL (ref 74–106)
HCO3 BLD-SCNC: 32.1 MEQ/L (ref 21–32)
HCT VFR BLD CALC: 24 % (ref 39–51)
HGB BLD-MCNC: 7.9 GM/DL (ref 13–17)
LYMPHOCYTES # BLD AUTO: 0.6 TH/MM3 (ref 1–4.8)
LYMPHOCYTES NFR BLD AUTO: 7.8 % (ref 9–44)
MAGNESIUM SERPL-MCNC: 2.1 MG/DL (ref 1.5–2.5)
MCH RBC QN AUTO: 33.4 PG (ref 27–34)
MCHC RBC AUTO-ENTMCNC: 33.1 % (ref 32–36)
MCV RBC AUTO: 101 FL (ref 80–100)
MONOCYTE #: 0.5 TH/MM3 (ref 0–0.9)
MONOCYTES NFR BLD: 7.2 % (ref 0–8)
NEUTROPHILS # BLD AUTO: 6.1 TH/MM3 (ref 1.8–7.7)
NEUTROPHILS NFR BLD AUTO: 83.7 % (ref 16–70)
PHOSPHATE SERPL-MCNC: 2.8 MG/DL (ref 2.5–4.9)
PLATELET # BLD: 201 TH/MM3 (ref 150–450)
PMV BLD AUTO: 7.8 FL (ref 7–11)
PROT SERPL-MCNC: 4.9 GM/DL (ref 6.4–8.2)
RBC # BLD AUTO: 2.38 MIL/MM3 (ref 4.5–5.9)
SODIUM SERPL-SCNC: 137 MEQ/L (ref 136–145)
WBC # BLD AUTO: 7.3 TH/MM3 (ref 4–11)

## 2018-02-04 RX ADMIN — MIDODRINE HYDROCHLORIDE SCH MG: 5 TABLET ORAL at 06:03

## 2018-02-04 RX ADMIN — HUMAN INSULIN SCH: 100 INJECTION, SOLUTION SUBCUTANEOUS at 20:08

## 2018-02-04 RX ADMIN — CHLORHEXIDINE GLUCONATE SCH PACK: 500 CLOTH TOPICAL at 04:00

## 2018-02-04 RX ADMIN — CALCIUM CARBONATE (ANTACID) CHEW TAB 500 MG SCH MG: 500 CHEW TAB at 20:08

## 2018-02-04 RX ADMIN — HUMAN INSULIN SCH: 100 INJECTION, SOLUTION SUBCUTANEOUS at 16:53

## 2018-02-04 RX ADMIN — MIDODRINE HYDROCHLORIDE SCH MG: 5 TABLET ORAL at 12:09

## 2018-02-04 RX ADMIN — Medication SCH ML: at 08:14

## 2018-02-04 RX ADMIN — HEPARIN SODIUM SCH UNITS: 10000 INJECTION, SOLUTION INTRAVENOUS; SUBCUTANEOUS at 20:08

## 2018-02-04 RX ADMIN — HUMAN INSULIN SCH: 100 INJECTION, SOLUTION SUBCUTANEOUS at 08:13

## 2018-02-04 RX ADMIN — Medication SCH ML: at 20:08

## 2018-02-04 RX ADMIN — HUMAN INSULIN SCH: 100 INJECTION, SOLUTION SUBCUTANEOUS at 12:00

## 2018-02-04 RX ADMIN — FLUCONAZOLE SCH MG: 200 TABLET ORAL at 08:13

## 2018-02-04 RX ADMIN — STANDARDIZED SENNA CONCENTRATE AND DOCUSATE SODIUM SCH TAB: 8.6; 5 TABLET, FILM COATED ORAL at 20:07

## 2018-02-04 RX ADMIN — PANTOPRAZOLE SCH MG: 40 TABLET, DELAYED RELEASE ORAL at 08:13

## 2018-02-04 RX ADMIN — HEPARIN SODIUM SCH UNITS: 10000 INJECTION, SOLUTION INTRAVENOUS; SUBCUTANEOUS at 08:13

## 2018-02-04 RX ADMIN — CHLORHEXIDINE GLUCONATE SCH PACK: 500 CLOTH TOPICAL at 20:08

## 2018-02-04 RX ADMIN — CALCIUM CARBONATE (ANTACID) CHEW TAB 500 MG SCH MG: 500 CHEW TAB at 08:13

## 2018-02-04 RX ADMIN — STANDARDIZED SENNA CONCENTRATE AND DOCUSATE SODIUM SCH TAB: 8.6; 5 TABLET, FILM COATED ORAL at 08:13

## 2018-02-04 RX ADMIN — MIDODRINE HYDROCHLORIDE SCH MG: 5 TABLET ORAL at 16:53

## 2018-02-04 NOTE — HHI.NPPN
Subjective


General Problems:  Anemia


Renal Failure:  End Stage Renal Disease


History of Present Illness


Patient is a very pleasant 67 year old male who came into the emergency room on 

01/26/18 hypotensive, lethargic, and with a hemoglobin of 6.7.  He was 

transfused 2 units of PRBC's with a recheck pending.  He was initially put on 

Levophed for blood pressure support however it is currently not running. Has a 

past medical history of End -stage renal disease with peritoneal dialysis, AFIB

, diabetes, arthritis, chronic pain, and hx of strokes.    Onset of acute 

epigastric pain and discomfort noted for 5 days ago, but worsened over the past 

24 hours.  Reported loose stools for 3 weeks.  In the emergency room patient 

did report melena, nausea, mild vomiting undigested food yesterday, but denied 

any coffee ground emesis. Patient has AV fistula and will have dialysis while 

here.  Per patient PD catheter needs to be removed secondary to infection.


Additional Remarks


Patient is alert, off and on has confusion and hallucinations, still on low 

dose pressors, no SOB.





Review of Systems


General


Constitutional:  Fatigue


General Remarks


weakness





Respiratory


Respiratory Remarks


Denies SOB





Cardiovascular


Cardiac Remarks


Denies CP





Gastrointestinal


GI Remarks


Denies abdominal pain





Objective Data


Data





Vital Signs








  Date Time  Temp Pulse Resp B/P (MAP) Pulse Ox O2 Delivery O2 Flow Rate FiO2


 


2/4/18 10:45  86 13 103/56 (72) 97   


 


2/4/18 10:30  85 12 97/56 (70) 98   


 


2/4/18 10:15  92 21 92/62 (72) 69   


 


2/4/18 10:00  82      


 


2/4/18 10:00  83 14 100/58 (72) 100   


 


2/4/18 09:45  82 15 102/57 (72) 95   


 


2/4/18 09:35  86 19 95/52 (66) 98   


 


2/4/18 09:30  102 24 86/50 (62) 65   


 


2/4/18 09:15  99 14 107/56 (73) 99   


 


2/4/18 09:00  105 18 100/71 (81) 93   


 


2/4/18 08:45  93 18 98/58 (71) 80   


 


2/4/18 08:30  86 13 92/50 (64) 97   


 


2/4/18 08:15  79 18 110/59 (76) 89   


 


2/4/18 08:00  90      


 


2/4/18 08:00 98.1 90 19 105/59 (74) 80   


 


2/4/18 07:45  76 9 110/56 (74) 98   


 


2/4/18 07:30  102 16 101/65 (77) 69   


 


2/4/18 07:15  125 19 91/61 (71) 94   


 


2/4/18 07:00  134  111/60    


 


2/4/18 06:52  124      


 


2/4/18 06:00  115  95/68    


 


2/4/18 06:00  115      


 


2/4/18 05:00  106  90/68    


 


2/4/18 04:45  117 15 92/68 (76) 76   


 


2/4/18 04:34  104 21 86/61 (69) 79   


 


2/4/18 04:30  108 17 85/56 (66) 91   


 


2/4/18 04:15  110 20 87/58 (68) 93   


 


2/4/18 04:00 98.3 105 16 83/54 (64) 95   


 


2/4/18 04:00  105      


 


2/4/18 03:45  116 11 90/56 (67) 92   


 


2/4/18 03:30  110 16 104/66 (79) 93   


 


2/4/18 03:15  107 17 104/58 (73) 94   


 


2/4/18 03:00  90 13 91/54 (66) 95   


 


2/4/18 03:00  90  91/54    


 


2/4/18 02:52  96 14 91/54 (66) 96   


 


2/4/18 02:46  94 17 87/51 (63)    


 


2/4/18 02:45  95 12 83/47 (59)    


 


2/4/18 02:30  102 18 97/58 (71)    


 


2/4/18 02:15  115 27 101/60 (74)    


 


2/4/18 02:00  100 19 112/53 (72) 84   


 


2/4/18 02:00  100      


 


2/4/18 02:00  100  112/53    


 


2/4/18 01:45  86 15 105/59 (74) 93   


 


2/4/18 01:30  83 13 95/52 (66) 95   


 


2/4/18 01:15  85 12 95/52 (66) 95   


 


2/4/18 01:00  86 12 96/51 (66) 97   


 


2/4/18 01:00  86  96/51    


 


2/4/18 00:45  87 13 101/57 (72) 97   


 


2/4/18 00:30  83 11 101/59 (73) 68   


 


2/4/18 00:15  91 14 98/54 (69)    


 


2/4/18 00:00 98.5 93 12 96/58 (71) 95   


 


2/4/18 00:00  93 12 96/58 (71) 95   


 


2/4/18 00:00  93      


 


2/3/18 23:45  104 24 100/56 (71) 94   


 


2/3/18 23:30  105 22 107/67 (80) 94   


 


2/3/18 23:15  91 14 96/59 (71) 95   


 


2/3/18 23:00  85 14 95/56 (69) 96   


 


2/3/18 23:00  85  95/56    


 


2/3/18 22:45  82 13 99/57 (71) 96   


 


2/3/18 22:30  87 17 110/51 (70) 96   


 


2/3/18 22:15  95 16 102/58 (73) 96   


 


2/3/18 22:00  92      


 


2/3/18 22:00  92 18 96/58 (71) 93   


 


2/3/18 21:45  92 18 107/56 (73) 92   


 


2/3/18 21:30  80 13 87/52 (64) 97   


 


2/3/18 21:15  81 12 90/51 (64) 97   


 


2/3/18 21:00  89 20 94/64 (74) 96   


 


2/3/18 21:00  89  94/64    


 


2/3/18 20:45  86 14 90/55 (67) 95   


 


2/3/18 20:30  79 16 92/54 (67) 97   


 


2/3/18 20:28     97   21


 


2/3/18 20:15  79 15 98/55 (69) 96   


 


2/3/18 20:00 98.0 86 12 95/61 (72) 98   


 


2/3/18 20:00  86      


 


2/3/18 19:00  80 13 105/59 (74) 99   


 


2/3/18 19:00  80  105/59    


 


2/3/18 18:45  91 12 107/59 (75) 97   


 


2/3/18 18:30  84 20 110/57 (74) 95   


 


2/3/18 18:15  90 14 111/58 (75) 99   


 


2/3/18 18:11  83  97/56    


 


2/3/18 18:00  103      


 


2/3/18 18:00  103 24 97/56 (70) 92   


 


2/3/18 17:45  104 25 111/54 (73) 92   


 


2/3/18 17:30  91 38 125/53 (77) 94   


 


2/3/18 17:15  80 13 108/53 (71) 97   


 


2/3/18 17:00  70 12 98/50 (66) 96   


 


2/3/18 16:45  89 21 99/51 (67) 94   


 


2/3/18 16:30  85 17 95/59 (71) 95   


 


2/3/18 16:15  98 13 99/57 (71) 94   


 


2/3/18 16:00 98.4 89 13 91/50 (64) 86   


 


2/3/18 16:00  89      


 


2/3/18 15:45  85 14 86/51 (63) 91   


 


2/3/18 15:30  80 10 99/53 (68) 93   


 


2/3/18 15:15  82 12 106/56 (73) 94   


 


2/3/18 15:00  89 26 93/53 (66) 93   


 


2/3/18 14:45  83 11 90/54 (66) 95   


 


2/3/18 14:30  84 11 95/50 (65) 97   


 


2/3/18 14:15  83 11 110/48 (68) 95   


 


2/3/18 14:00  79      


 


2/3/18 14:00  79 15 99/51 (67) 88   


 


2/3/18 13:45  78 12 98/51 (67) 93   


 


2/3/18 13:30  82 11 106/52 (70) 83   


 


2/3/18 13:25  91  125/58    


 


2/3/18 13:15  85 24 125/58 (80) 83   


 


2/3/18 13:00  82 13 105/54 (71) 100   


 


2/3/18 13:00  82 13 105/54 (71) 100   


 


2/3/18 12:45  109 25 89/51 (64) 84   


 


2/3/18 12:45  109  89/51    


 


2/3/18 12:30  85 26 80/49 (59) 95   


 


2/3/18 12:15  90 26 99/57 (71) 90   


 


2/3/18 12:00  74      


 


2/3/18 12:00 98.4 74 12 102/54 (70) 96   


 


2/3/18 11:45  84 20 106/62 (77) 92   








-:  


2/4/18 0356                                                                    

            2/4/18 0356








Physical Exam


General


Appearance:  Malnourished





Neck


Neck Exam:  Neck Supple





Pulmonary


Resp Exam:  Clear Bilaterally





Cardiology


CV Exam:  Regular





Gastrointestinal/Abdomen


GI Exam:  Soft





Extremeties


Extremities Exam:  No Edema





Assessment/Plan


Problem List:  


(1) ESRD (end stage renal disease) on dialysis


ICD Codes:  N18.6 - End stage renal disease; Z99.2 - Dependence on renal 

dialysis


Plan:  Dialysis MWF


HD done Friday 3 L off


stable


monitor


PD catheter removed


Dr. Hoang to follow





(2) Peritonitis associated with peritoneal dialysis


ICD Codes:  T85.71XA - Infection and inflammatory reaction due to peritoneal 

dialysis catheter, initial encounter


Plan:  Peritoneal fluid culture positive for Candida. 


PD catheter removed


ID and surgery following. 





(3) Hypotension


ICD Codes:  I95.9 - Hypotension, unspecified


Status:  Acute


Plan:  Sepsis present on admission. 


Continue supportive care. 





(4) GI bleed


ICD Codes:  K92.2 - Gastrointestinal hemorrhage, unspecified


Status:  Acute


Plan:  s/p EGD: duodenal ulcer, gastritis, esophagitis. 


Received blood transfusion. Hemoglobin stable








(5) Anemia


ICD Codes:  D64.9 - Anemia, unspecified


Plan:  HGB low








Problem Qualifiers





(1) Hypotension:  


Qualified Codes:  I95.9 - Hypotension, unspecified


(2) GI bleed:  


Qualified Codes:  K92.2 - Gastrointestinal hemorrhage, unspecified


(3) Anemia:  








Cydney Castellanos MD Feb 4, 2018 11:44

## 2018-02-04 NOTE — HHI.CCPN
Subjective


Remarks/Hospital Course


67 year-old male with a medical history significant for end-stage renal disease 

on peritoneal dialysis who was noted to be severely hypotensive and lethargic 

at the dialysis center on 1/25 and 911 was called and patient was transferred 

to [facility] ER.  On arrival he was noted to be hypotensive with SBP 70s 

and heart rate 100s.  He also reportedly had significant melena and rectal 

bleeding following arrival.  A right IJ central line was placed by ER 

physician.  Patient's wife arrived and wanted to make patient DNR status and 

did not want blood transfusions and wished transferring to hospice however 

subsequently patient woke up and wanted to continue aggressive care including 

blood transfusions.  ER physician contacted me questioning patient's wife's 

ability to make decisions for him due to concern for secondary gain as well as 

possible psychiatric illness.  Patient was accepted for admission by critical 

care medicine service.  4 units PRBCs were ordered to be transfused stat by ER 

physician.  When I arrived to see the patient he was laying in the ER stretcher 

on nasal cannula on 5 mics of Levophed.  He is drowsy but easily arousable.  He 

knew he was at the hospital.


He could not give me details of his history.  He did tell me that Dr. Hoang is 

his nephrologist.





1/27 Patient is on Levophed 2 mics, s/p HD yesterday. Awake and alert on 

Protonix drip. s/p EGD yesterday showed 2 large Duodenal ulcer, esophagitis and 

gastritis.


1/28:  Afebrile.  Currently off norepinephrine but MAP is around 60 currently.  

States he is okay with removing peritoneal dialysis catheter.  Will discuss 

with general surgery in nephrology in a.m.


1/29:  Blood pressures are borderline.  Awake and alert and eating popsicles 

currently.  Patient's okay with removing peroneal dialysis catheter.  Dr. Koch as yet to see patient today.  Agree with ID and nephrology's 

recommendations for removal.


1/30 Patient s/p PD catheter removal this morning. On Levophed 2 mics. Afebrile.


1/31:  Postoperative day #1 PD catheter removal.  Remains on norepinephrine at 

2 g per minute.  Asymptomatic.  Plan for hemodialysis today.


2/1:  Afebrile.  Intermittently A. fib with RVR, currently rate controlled.  

Denies chest pain or shortness of breath.  Intermittently on phenylephrine drip


2/2:  Resting in bed in no acute distress.  Remains on Johny-Synephrine drip at 

60 mics grams per minute.  Appears more depressed today.  Status post -3 L of 

hemodialysis.





Subjective





2/3:  Resting in bed in no acute distress.  Afebrile.  Remains on low-dose 

phenylephrine at 50 mics grams per minute.  More attentive today.


2/4: No acute issues overnight.  Patient remains on low-dose phenylephrine 

currently at 40 mcgs/min.  Patient continues on Midrin 3 times a day.  

Hemoglobin stable.  Patient tolerating diet.





Objective





Vital Signs








  Date Time  Temp Pulse Resp B/P (MAP) Pulse Ox O2 Delivery O2 Flow Rate FiO2


 


2/4/18 18:00  114      


 


2/4/18 17:45   22 99/70 (80) 81   


 


2/4/18 08:00 98.1       


 


2/3/18 20:28        21


 


1/31/18 23:09      Nasal Cannula  


 


1/31/18 07:00       2.00 














Intake and Output   


 


 2/4/18 2/4/18 2/5/18





 08:00 16:00 00:00


 


Intake Total 192 ml  120 ml


 


Output Total 0 ml  0 ml


 


Balance 192 ml  120 ml








Result Diagram:  


2/4/18 0356                                                                    

            2/4/18 0356





Imaging





Last Impressions








Chest X-Ray 1/27/18 0000 Signed





Impressions: 





 Service Date/Time:  Saturday, January 27, 2018 03:15 - CONCLUSION:  Patchy 





 consolidation or atelectasis of the left lower lung.     Chucky Morton MD 








Last Impressions








Chest X-Ray 1/27/18 0000 Signed





Impressions: 





 Service Date/Time:  Saturday, January 27, 2018 03:15 - CONCLUSION:  Patchy 





 consolidation or atelectasis of the left lower lung.     Chucky Morton MD 








Objective Remarks


GENERAL: 67 year  male resting in bed in no acute distress, pleasantly 

conversant and interactive


SKIN: Woman dry.  No rash


HEAD: Atraumatic. Normocephalic. 


EYES:  No scleral icterus. No injection or drainage. 


ENT: No nasal bleeding or discharge.  Mucous membranes pink and dry..


NECK: Trachea midline.  Supple.


CARDIOVASCULAR: Heart rate in the 80s.  Irregularly irregular consistent with 

A. fib.


RESPIRATORY: No accessory muscle use. Clear to auscultation. Breath sounds 

equal bilaterally.  Decreased respiratory effort.


GASTROINTESTINAL: Abdomen soft, nondistended.  PD catheter has been removed.  

Site is clean dry and intact.  No signs erythema drainage.


MUSCULOSKELETAL: Patient has a well-healed rt heel ulcer.  Venous stasis 

changes noted to his bilateral lower extremities.


NEUROLOGICAL: Awake and weak and mildly confused. No obvious cranial nerve 

deficits.  Moves bilateral upper extremities.


BACK: Stage I/early decubitus in his sacral area.


Date of Insertion:  Jan 25, 2018


Line:  Central Venous Catheter


Side:  Right


Location:  Internal, Jugular





A/P


Assessment and Plan


Neuro/Psych: 





Depression


Chronic Pain syndrome


History of CVA 1986/2007





Holding paroxetine 10 mg daily/home medication.  Resume when clinically 

indicated


Avoid sedatives and narcotics including tramadol/home medication, monitor neuro 

status. Awake and alert


Holding clopidogrel 75 mg daily.  Resume when okay with GI





CV: 





Atrial fibrillation rate controlled


History of essential hypertension





On  phenylephrine at 50 g per minute- Monitor HR and BP keep MAP>65mmHg


Lactic acid 2.2 on 1/27


Holding clopidogrel 75 mg daily light of duodenal ulcer


Holding lisinopril 20 mill grams daily, Imdur and metoprolol 50 mill grams 

daily in light of hypotension


random Cortisol level.  20


Continue Midodrine 10 mg 3 times a day








Pulm: 





Continue with oxygen keep sat >92%.  Currently on room air


Albuterol aerosols every 2 hours when necessary dyspnea





GI/liver: 





Upper GI bleed secondary to duodenal ulcer





Currently on soft renal diet


On pantoprazole 40 mg by mouth daily


s/p EGD 1/26 which showed 2 large duodenal ulcers, grade D esophagitis, 

gastritis.





Renal/: 





Status post PD catheter removal day #4 Dr. Juarez


End-stage renal disease on hemodialysis





Monitor renal function, I/O's, avoid nephrotoxins.


Pathology Dr. Hoang.  General Surgery has followed- Dr. Juarez fungal 

infection requiring PD catheter removal on 1/30. 





Heme:





Macrocytic anemia





Monitor CBC, s/p transfusion 2units PRBC on 1/26


Hgb stable, continue to monitor





Endocrine: 





Diabetes mellitus


Elevated TSH - repeat in 3-6 weeks.  Free T4 normal.  Free T3 low.  Would not 

initiate any levothyroxine at this time in this acute clinical setting





SSI for glycemic control with low Novulog before meals at bedtime


Holding Tradjenta 5 mg daily





ID: 





Infected peritoneal dialysis catheter/peritonitis fungal





Continue fluconazole 200 mg po daily docusate 2/13


Monitor for signs of infections ( Fever, WBC) follow up on cultures.  

Peritoneal fluid 1/26 positive for yeast/Candida


ID is following.





MSK:  





Right heel ulcer


Sacral decubitus ulcer





2/4 Wound care consulted evaluate and treat


2/4 Transfer pt on Radha bed





Prophylaxis: Pantoprazole/heparin subcutaneous





Lines: Right IJ CVP placed 1/25





Level II follow-up


Physician


Miriam Barahona MD Feb 4, 2018 18:54

## 2018-02-05 VITALS
OXYGEN SATURATION: 98 % | HEART RATE: 71 BPM | DIASTOLIC BLOOD PRESSURE: 51 MMHG | SYSTOLIC BLOOD PRESSURE: 93 MMHG | RESPIRATION RATE: 14 BRPM | TEMPERATURE: 97.7 F

## 2018-02-05 VITALS
SYSTOLIC BLOOD PRESSURE: 101 MMHG | DIASTOLIC BLOOD PRESSURE: 56 MMHG | OXYGEN SATURATION: 96 % | RESPIRATION RATE: 14 BRPM | TEMPERATURE: 96.7 F | HEART RATE: 76 BPM

## 2018-02-05 VITALS
TEMPERATURE: 97.4 F | HEART RATE: 99 BPM | SYSTOLIC BLOOD PRESSURE: 94 MMHG | RESPIRATION RATE: 15 BRPM | OXYGEN SATURATION: 94 % | DIASTOLIC BLOOD PRESSURE: 68 MMHG

## 2018-02-05 VITALS
SYSTOLIC BLOOD PRESSURE: 106 MMHG | DIASTOLIC BLOOD PRESSURE: 53 MMHG | RESPIRATION RATE: 20 BRPM | OXYGEN SATURATION: 96 % | TEMPERATURE: 97.6 F | HEART RATE: 96 BPM

## 2018-02-05 VITALS — HEART RATE: 81 BPM

## 2018-02-05 VITALS — HEART RATE: 77 BPM

## 2018-02-05 VITALS — HEART RATE: 96 BPM

## 2018-02-05 VITALS
SYSTOLIC BLOOD PRESSURE: 123 MMHG | RESPIRATION RATE: 15 BRPM | HEART RATE: 76 BPM | DIASTOLIC BLOOD PRESSURE: 63 MMHG | TEMPERATURE: 97.5 F | OXYGEN SATURATION: 93 %

## 2018-02-05 VITALS
RESPIRATION RATE: 16 BRPM | DIASTOLIC BLOOD PRESSURE: 64 MMHG | SYSTOLIC BLOOD PRESSURE: 106 MMHG | OXYGEN SATURATION: 99 % | HEART RATE: 83 BPM | TEMPERATURE: 96.6 F

## 2018-02-05 VITALS — HEART RATE: 76 BPM

## 2018-02-05 VITALS — HEART RATE: 103 BPM

## 2018-02-05 VITALS — HEART RATE: 88 BPM

## 2018-02-05 VITALS — OXYGEN SATURATION: 95 %

## 2018-02-05 LAB
BUN SERPL-MCNC: 40 MG/DL (ref 7–18)
CALCIUM SERPL-MCNC: 6.9 MG/DL (ref 8.5–10.1)
CALCIUM TP COR SERPL-MCNC: 7.9 MG/DL (ref 8.5–10.1)
CHLORIDE SERPL-SCNC: 98 MEQ/L (ref 98–107)
CREAT SERPL-MCNC: 4.78 MG/DL (ref 0.6–1.3)
ERYTHROCYTE [DISTWIDTH] IN BLOOD BY AUTOMATED COUNT: 22.8 % (ref 11.6–17.2)
GFR SERPLBLD BASED ON 1.73 SQ M-ARVRAT: 12 ML/MIN (ref 89–?)
GLUCOSE SERPL-MCNC: 141 MG/DL (ref 74–106)
HCO3 BLD-SCNC: 28.4 MEQ/L (ref 21–32)
HCT VFR BLD CALC: 22.8 % (ref 39–51)
HGB BLD-MCNC: 7.5 GM/DL (ref 13–17)
MAGNESIUM SERPL-MCNC: 2.1 MG/DL (ref 1.5–2.5)
MCH RBC QN AUTO: 33.3 PG (ref 27–34)
MCHC RBC AUTO-ENTMCNC: 32.7 % (ref 32–36)
MCV RBC AUTO: 101.9 FL (ref 80–100)
PHOSPHATE SERPL-MCNC: 3.7 MG/DL (ref 2.5–4.9)
PLATELET # BLD: 217 TH/MM3 (ref 150–450)
PMV BLD AUTO: 7.6 FL (ref 7–11)
PROT SERPL-MCNC: 5.2 GM/DL (ref 6.4–8.2)
RBC # BLD AUTO: 2.24 MIL/MM3 (ref 4.5–5.9)
SODIUM SERPL-SCNC: 137 MEQ/L (ref 136–145)
WBC # BLD AUTO: 6.2 TH/MM3 (ref 4–11)

## 2018-02-05 RX ADMIN — MIDODRINE HYDROCHLORIDE SCH MG: 5 TABLET ORAL at 05:42

## 2018-02-05 RX ADMIN — HUMAN INSULIN SCH: 100 INJECTION, SOLUTION SUBCUTANEOUS at 12:00

## 2018-02-05 RX ADMIN — HEPARIN SODIUM SCH UNITS: 10000 INJECTION, SOLUTION INTRAVENOUS; SUBCUTANEOUS at 20:57

## 2018-02-05 RX ADMIN — MIDODRINE HYDROCHLORIDE SCH MG: 5 TABLET ORAL at 17:10

## 2018-02-05 RX ADMIN — MIDODRINE HYDROCHLORIDE SCH MG: 5 TABLET ORAL at 12:03

## 2018-02-05 RX ADMIN — CALCIUM CARBONATE (ANTACID) CHEW TAB 500 MG SCH MG: 500 CHEW TAB at 20:57

## 2018-02-05 RX ADMIN — Medication SCH ML: at 20:58

## 2018-02-05 RX ADMIN — Medication SCH ML: at 08:51

## 2018-02-05 RX ADMIN — FLUCONAZOLE SCH MG: 200 TABLET ORAL at 08:07

## 2018-02-05 RX ADMIN — STANDARDIZED SENNA CONCENTRATE AND DOCUSATE SODIUM SCH TAB: 8.6; 5 TABLET, FILM COATED ORAL at 08:07

## 2018-02-05 RX ADMIN — HUMAN INSULIN SCH: 100 INJECTION, SOLUTION SUBCUTANEOUS at 17:00

## 2018-02-05 RX ADMIN — EPOETIN ALFA PRN UNITS: 10000 SOLUTION INTRAVENOUS; SUBCUTANEOUS at 10:37

## 2018-02-05 RX ADMIN — ALBUMIN HUMAN PRN MLS/HR: 0.25 SOLUTION INTRAVENOUS at 10:37

## 2018-02-05 RX ADMIN — GELATIN ABSORBABLE SPONGE 12-7 MM PRN FOAM: 12-7 MISC at 10:38

## 2018-02-05 RX ADMIN — PHENYLEPHRINE HYDROCHLORIDE PRN MLS/HR: 10 INJECTION INTRAVENOUS at 04:27

## 2018-02-05 RX ADMIN — HUMAN INSULIN SCH: 100 INJECTION, SOLUTION SUBCUTANEOUS at 20:57

## 2018-02-05 RX ADMIN — HUMAN INSULIN SCH: 100 INJECTION, SOLUTION SUBCUTANEOUS at 08:00

## 2018-02-05 RX ADMIN — HEPARIN SODIUM SCH UNITS: 10000 INJECTION, SOLUTION INTRAVENOUS; SUBCUTANEOUS at 08:07

## 2018-02-05 RX ADMIN — CALCIUM CARBONATE (ANTACID) CHEW TAB 500 MG SCH MG: 500 CHEW TAB at 08:07

## 2018-02-05 RX ADMIN — STANDARDIZED SENNA CONCENTRATE AND DOCUSATE SODIUM SCH TAB: 8.6; 5 TABLET, FILM COATED ORAL at 20:58

## 2018-02-05 RX ADMIN — PANTOPRAZOLE SCH MG: 40 TABLET, DELAYED RELEASE ORAL at 08:07

## 2018-02-05 NOTE — HHI.NPPN
Subjective


General Problems:  Anemia


Renal Failure:  End Stage Renal Disease


History of Present Illness


Patient is a very pleasant 67 year old male who came into the emergency room on 

01/26/18 hypotensive, lethargic, and with a hemoglobin of 6.7.  He was 

transfused 2 units of PRBC's with a recheck pending.  He was initially put on 

Levophed for blood pressure support however it is currently not running. Has a 

past medical history of End -stage renal disease with peritoneal dialysis, AFIB

, diabetes, arthritis, chronic pain, and hx of strokes.    Onset of acute 

epigastric pain and discomfort noted for 5 days ago, but worsened over the past 

24 hours.  Reported loose stools for 3 weeks.  In the emergency room patient 

did report melena, nausea, mild vomiting undigested food yesterday, but denied 

any coffee ground emesis. Patient has AV fistula and will have dialysis while 

here.  Per patient PD catheter needs to be removed secondary to infection.


Additional Remarks


Patient is alert continues on meagan-synephrine for blood pressure support, no SOB.


 (Gellermann,Diane M. ARNP)





Review of Systems


General


Constitutional:  Fatigue


General Remarks


weakness


 (Gellermann,Diane M. ARNP)





Respiratory


Respiratory Remarks


Denies SOB


 (Gellermann,Diane M. ARNP)





Cardiovascular


Cardiac Remarks


Denies CP


 (Gellermann,Diane M. ARNP)





Gastrointestinal


GI Remarks


Denies abdominal pain


 (Gellermann,Diane M. ARNP)





Objective Data


Data











 2/5/18 2/6/18





 19:00 07:00


 


Output Total 1500 ml 


 


Balance -1500 ml 


 


  


 


Hemodialysis 1500 ml 











Vital Signs








  Date Time  Temp Pulse Resp B/P (MAP) Pulse Ox O2 Delivery O2 Flow Rate FiO2


 


2/5/18 14:00  88      


 


2/5/18 12:00  76      


 


2/5/18 12:00 96.7 76 14 101/56 (71) 96   


 


2/5/18 10:00  76      


 


2/5/18 08:00  83      


 


2/5/18 08:00 96.6 83 16 106/64 (78) 99   


 


2/5/18 06:00  103      


 


2/5/18 06:00  103  94/54    


 


2/5/18 04:27  93  112/64    


 


2/5/18 04:00 97.6 96 20 106/53 (70) 96   


 


2/5/18 04:00  96      


 


2/5/18 04:00  96  106/53    


 


2/5/18 02:00  96      


 


2/5/18 02:00  96  103/61    


 


2/5/18 00:00  99      


 


2/5/18 00:00  99  94/68    


 


2/5/18 00:00 97.4 99 15 94/68 (77) 94   


 


2/4/18 22:00  94      


 


2/4/18 22:00  94  85/55    


 


2/4/18 20:30  88  93/54    


 


2/4/18 20:00  81      


 


2/4/18 20:00 98.0 81 16 86/47 (60) 95   


 


2/4/18 20:00  81  86/47    


 


2/4/18 19:00  116  112/58    


 


2/4/18 18:00  114      


 


2/4/18 17:45  109 22 99/70 (80) 81   


 


2/4/18 17:30  99 20 92/59 (70) 90   


 


2/4/18 17:15  109 20 99/58 (72) 72   


 


2/4/18 17:00  106 22 100/59 (73) 67   


 


2/4/18 16:45  91 13 101/67 (78) 91   


 


2/4/18 16:30  92 13 103/67 (79) 73   


 


2/4/18 16:15  93 17 100/62 (75) 92   


 


2/4/18 16:00  101      


 


2/4/18 16:00  101 17 106/57 (73) 92   


 


2/4/18 15:45  100 14 98/55 (69) 94   


 


2/4/18 15:30    91/53 (66)    


 


2/4/18 15:15  102 19 98/58 (71) 82   


 


2/4/18 15:00  87 13 99/55 (70) 98   


 


2/4/18 14:45  84 17 84/58 (67) 86   


 


2/4/18 14:30  91 17 92/53 (66) 83   








 (Gellermann,Diane M. ARNP)


-:  


2/5/18 0450                                                                    

            2/5/18 0450





Imaging





Last Impressions








Chest X-Ray 1/27/18 0000 Signed





Impressions: 





 Service Date/Time:  Saturday, January 27, 2018 03:15 - CONCLUSION:  Patchy 





 consolidation or atelectasis of the left lower lung.     Chucky Morton MD 








 (Gellermann,Diane M. [facility])





Physical Exam


General


Appearance:  Malnourished


 (Gellermann,Diane M. ARN)





Neck


Neck Exam:  Neck Supple


 (Gellermann,Diane M. [facility])





Pulmonary


Resp Exam:  Clear Bilaterally


 (Gellermann,Diane M. ARN)





Cardiology


CV Exam:  Regular


 (Gellermann,Diane M. ARN)





Gastrointestinal/Abdomen


GI Exam:  Soft


 (Gellermann,Diane M. ARN)





Extremeties


Extremities Exam:  No Edema


 (Gellermann,Diane M. ARN)





Assessment/Plan


Problem List:  


(1) ESRD (end stage renal disease) on dialysis


ICD Codes:  N18.6 - End stage renal disease; Z99.2 - Dependence on renal 

dialysis


Plan:  Dialysis MWF


HD done done today.  1 .5 liters removed


Protein corrected calcium at 7.9 on oral replacement. 


HGB 7.5 down from 7.9


Continue on midodrine and meagan synephrine for blood pressure support


Labs in AM





(2) Peritonitis associated with peritoneal dialysis


ICD Codes:  T85.71XA - Infection and inflammatory reaction due to peritoneal 

dialysis catheter, initial encounter


Plan:  Peritoneal fluid culture positive for Candida. 


PD catheter removed


ID and surgery following. 





(3) Hypotension


ICD Codes:  I95.9 - Hypotension, unspecified


Status:  Acute


Plan:  Sepsis present on admission. 


Continue supportive care. 





(4) GI bleed


ICD Codes:  K92.2 - Gastrointestinal hemorrhage, unspecified


Status:  Acute


Plan:  s/p EGD: duodenal ulcer, gastritis, esophagitis. 


Received 2 units PRBC on admission.








(5) Anemia


ICD Codes:  D64.9 - Anemia, unspecified


Plan:  HGB 7.5 today


 (Gellermann,Diane M. ARN)


Problem List:  


(1) ESRD (end stage renal disease) on dialysis


ICD Codes:  N18.6 - End stage renal disease; Z99.2 - Dependence on renal 

dialysis


Plan:  Dialysis MWF


HD done done today.  1 .5 liters removed


Protein corrected calcium at 7.9 on oral replacement. 


HGB 7.5 down from 7.9


Continue on midodrine and Phenylephrine for blood pressure support


Labs in AM.


Patient seen an examined, agree with above.





(2) Peritonitis associated with peritoneal dialysis


ICD Codes:  T85.71XA - Infection and inflammatory reaction due to peritoneal 

dialysis catheter, initial encounter


Plan:  Peritoneal fluid culture positive for Candida. 


PD catheter removed


ID and surgery following. 





(3) Hypotension


ICD Codes:  I95.9 - Hypotension, unspecified


Status:  Acute


Plan:  Sepsis present on admission. 


Continue supportive care. 





(4) GI bleed


ICD Codes:  K92.2 - Gastrointestinal hemorrhage, unspecified


Status:  Acute


Plan:  s/p EGD: duodenal ulcer, gastritis, esophagitis. 


Received 2 units PRBC on admission.








(5) Anemia


ICD Codes:  D64.9 - Anemia, unspecified


Plan:  HGB 7.5 today


 (Carlita Hoang MD)





Problem Qualifiers





(1) Hypotension:  


Qualified Codes:  I95.9 - Hypotension, unspecified


(2) GI bleed:  


Qualified Codes:  K92.2 - Gastrointestinal hemorrhage, unspecified


(3) Anemia:  








Gellermann,Diane M. ARNP Feb 5, 2018 14:31


Carlita Hoang MD Feb 5, 2018 14:35

## 2018-02-05 NOTE — [email or web address]
Wound Consult


Description:


Wound consult ordered by   for  sacral pressure ulcer


Communicated with:


Brandy ALBRIGHT RN 5th floor [facility] 


Recommendation:


1) Reposition patient every 2 hours for comfort and offloading.


2) Float heels when in bed avoiding contact with mattress.


3) Apply thick layer of Calazime cream to sacrum/left buttocks BID or after 

loose stool.


4) Apply Santyl 2mm thick to bilateral heel wounds.Cover with calcium alginate 

cut to fit wound base secure with dry boarder gauze sign and date. Change daily.


5) Apply povidone iodine to Left great toe blister Daily.


6) Skin prep Left A/C superficial adhesive wounds BID avoiding use of adhesive 

tape other than paper tape please.


Additional Information:


Patient was seen today on 5th floor [facility] [place] writer and Brandy ORTEGA RN 5th floor 

[facility]Patient alert in bed upon writer arrival .Denies any discomfort at this 

time.


Assessment finding of bilateral lower extremities Left Calcaneus presents with 

5.8cm x 4.5cm x unstable soft eschar unstageable pressure injury periwound 

intact no drainage or odor noted.Wound base is 100% soft/semi boggy unstable 

black eschar.


Left great toe has a 0.6cm x 0.9cm intact boggy bulla painted with Iodine.


Right Calcaneus presents with a 3.0cm x 3.0cm x Slough unstageable pressure 

injury.Periwound unremarkable wound edges even with wound base.No drainage or 

odor noted.


Bilateral Calcaneus cleansed with normal saline pat dry skin prep applied and 

covered with dry dressing till Santyl available floated on pillow avoiding 

contact with mattress.


Left bicep deisy fistula has scattered partial thickness wounds created by 

adhesive tape scant bloody drainage noted no odor present .Writer encrusted 

wounds with stoma powder and skin prep left open to air.


Patient required 1 person assistance to reposition to right side.Patient has a 

mixed etiology of pressure,moisture,incontinence and friction wound to Sacrum 

and left buttocks measuring 6.0cm x 4.0cm x 0.2cm wound base is ~50% pink/red 

tissue with ~50% yellow slough biofilm.Wound edges are jagged but even with 

wound base.DTI noted to sacrum measuring ~0.3cm x ~0.3cm dark purple in color 

intact.Scant serosanguineous drainage noted to underpad.


Sacrum/Buttocks cleansed with normal saline pat dry thick layer of Calazime 

cream applied to all open areas.


Patient was then repositioned to Right side with 2 pillow under back/buttocks.











[facility] Feb 5, 2018 14:54

## 2018-02-05 NOTE — HHI.CCPN
Subjective


Remarks/Hospital Course


67 year-old male with a medical history significant for end-stage renal disease 

on peritoneal dialysis who was noted to be severely hypotensive and lethargic 

at the dialysis center on 1/25 and 911 was called and patient was transferred 

to [facility] ER.  On arrival he was noted to be hypotensive with SBP 70s 

and heart rate 100s.  He also reportedly had significant melena and rectal 

bleeding following arrival.  A right IJ central line was placed by ER 

physician.  Patient's wife arrived and wanted to make patient DNR status and 

did not want blood transfusions and wished transferring to hospice however 

subsequently patient woke up and wanted to continue aggressive care including 

blood transfusions.  ER physician contacted me questioning patient's wife's 

ability to make decisions for him due to concern for secondary gain as well as 

possible psychiatric illness.  Patient was accepted for admission by critical 

care medicine service.  4 units PRBCs were ordered to be transfused stat by ER 

physician.  When I arrived to see the patient he was laying in the ER stretcher 

on nasal cannula on 5 mics of Levophed.  He is drowsy but easily arousable.  He 

knew he was at the hospital.


He could not give me details of his history.  He did tell me that Dr. Hoang is 

his nephrologist.





1/27 Patient is on Levophed 2 mics, s/p HD yesterday. Awake and alert on 

Protonix drip. s/p EGD yesterday showed 2 large Duodenal ulcer, esophagitis and 

gastritis.


1/28:  Afebrile.  Currently off norepinephrine but MAP is around 60 currently.  

States he is okay with removing peritoneal dialysis catheter.  Will discuss 

with general surgery in nephrology in a.m.


1/29:  Blood pressures are borderline.  Awake and alert and eating popsicles 

currently.  Patient's okay with removing peroneal dialysis catheter.  Dr. Koch as yet to see patient today.  Agree with ID and nephrology's 

recommendations for removal.


1/30 Patient s/p PD catheter removal this morning. On Levophed 2 mics. Afebrile.


1/31:  Postoperative day #1 PD catheter removal.  Remains on norepinephrine at 

2 g per minute.  Asymptomatic.  Plan for hemodialysis today.


2/1:  Afebrile.  Intermittently A. fib with RVR, currently rate controlled.  

Denies chest pain or shortness of breath.  Intermittently on phenylephrine drip


2/2:  Resting in bed in no acute distress.  Remains on Johny-Synephrine drip at 

60 mics grams per minute.  Appears more depressed today.  Status post -3 L of 

hemodialysis.





Subjective





2/3:  Resting in bed in no acute distress.  Afebrile.  Remains on low-dose 

phenylephrine at 50 mics grams per minute.  More attentive today.


2/4: No acute issues overnight.  Patient remains on low-dose phenylephrine 

currently at 40 mcgs/min.  Patient continues on Midrin 3 times a day.  

Hemoglobin stable.  Patient tolerating diet.


2/5: The patient continues on Phenylephrine infusion, now increased to 60 mcgs/

min.





Objective





Vital Signs








  Date Time  Temp Pulse Resp B/P (MAP) Pulse Ox O2 Delivery O2 Flow Rate FiO2


 


2/5/18 16:00  76      


 


2/5/18 16:00 97.5  15 123/63 (83) 93   


 


2/3/18 20:28        21














Intake and Output   


 


 2/5/18 2/5/18 2/6/18





 08:00 16:00 00:00


 


Intake Total 620 ml  


 


Output Total 10 ml 1500 ml 


 


Balance 610 ml -1500 ml 








Result Diagram:  


2/5/18 0450                                                                    

            2/5/18 0450





Imaging





Last Impressions








Chest X-Ray 1/27/18 0000 Signed





Impressions: 





 Service Date/Time:  Saturday, January 27, 2018 03:15 - CONCLUSION:  Patchy 





 consolidation or atelectasis of the left lower lung.     Chucky Morton MD 








Last Impressions








Chest X-Ray 1/27/18 0000 Signed





Impressions: 





 Service Date/Time:  Saturday, January 27, 2018 03:15 - CONCLUSION:  Patchy 





 consolidation or atelectasis of the left lower lung.     Chucky Morton MD 








Objective Remarks


GENERAL: 67 year  male resting in bed in no acute distress, pleasantly 

conversant and interactive


SKIN: Woman dry.  No rash


HEAD: Atraumatic. Normocephalic. 


EYES:  No scleral icterus. No injection or drainage. 


ENT: No nasal bleeding or discharge.  Mucous membranes pink and dry..


NECK: Trachea midline.  Supple.


CARDIOVASCULAR: Heart rate in the 80s.  Irregularly irregular consistent with 

A. fib.


RESPIRATORY: No accessory muscle use. Clear to auscultation. Breath sounds 

equal bilaterally.  Decreased respiratory effort.


GASTROINTESTINAL: Abdomen soft, nondistended.  PD catheter has been removed.  

Site is clean dry and intact.  No signs erythema drainage.


MUSCULOSKELETAL: Patient has a well-healed rt heel ulcer.  Venous stasis 

changes noted to his bilateral lower extremities.


NEUROLOGICAL: Awake and weak and mildly confused. No obvious cranial nerve 

deficits.  Moves bilateral upper extremities.


BACK: Stage I/early decubitus in his sacral area.


Date of Insertion:  Jan 25, 2018


Line:  Central Venous Catheter


Side:  Right


Location:  Internal, Jugular





A/P


Assessment and Plan


Neuro/Psych: 





Depression


Chronic Pain syndrome


History of CVA 1986/2007





Holding paroxetine 10 mg daily/home medication.  Resume when clinically 

indicated


Avoid sedatives and narcotics including tramadol/home medication, monitor neuro 

status. Awake and alert


Holding clopidogrel 75 mg daily.  Resume when okay with GI





CV: 





Atrial fibrillation rate controlled


History of essential hypertension





On  phenylephrine at 50 g per minute- Monitor HR and BP keep MAP>65mmHg


Lactic acid 2.2 on 1/27


Holding clopidogrel 75 mg daily light of duodenal ulcer


Holding lisinopril 20 mill grams daily, Imdur and metoprolol 50 mill grams 

daily in light of hypotension


random Cortisol level.  20


Continue Midodrine 10 mg 3 times a day








Pulm: 





Continue with oxygen keep sat >92%.  Currently on room air


Albuterol aerosols every 2 hours when necessary dyspnea





GI/liver: 





Upper GI bleed secondary to duodenal ulcer





Currently on soft renal diet


On pantoprazole 40 mg by mouth daily


s/p EGD 1/26 which showed 2 large duodenal ulcers, grade D esophagitis, 

gastritis.





Renal/: 





Status post PD catheter removal day #4 Dr. Juarez


End-stage renal disease on hemodialysis





Monitor renal function, I/O's, avoid nephrotoxins.


Pathology Dr. Hoang.  General Surgery has followed- Dr. Juarez fungal 

infection requiring PD catheter removal on 1/30. 





Heme:





Macrocytic anemia





Monitor CBC, s/p transfusion 2units PRBC on 1/26


Hgb stable, continue to monitor





Endocrine: 





Diabetes mellitus


Elevated TSH - repeat in 3-6 weeks.  Free T4 normal.  Free T3 low.  Would not 

initiate any levothyroxine at this time in this acute clinical setting





SSI for glycemic control with low Novulog before meals at bedtime


Holding Tradjenta 5 mg daily





ID: 





Infected peritoneal dialysis catheter/peritonitis fungal





Continue fluconazole 200 mg po daily docusate 2/13


Monitor for signs of infections ( Fever, WBC) follow up on cultures.  

Peritoneal fluid 1/26 positive for yeast/Candida


ID is following.





MSK:  





Right heel ulcer


Sacral decubitus ulcer





2/4 Wound care consulted evaluate and treat


2/4 Transfer pt on Radha bed





Prophylaxis: Pantoprazole/heparin subcutaneous





Lines: Right IJ CVP placed 1/25





Level II follow-up


Physician


Miriam Barahona MD Feb 5, 2018 16:52

## 2018-02-06 VITALS
DIASTOLIC BLOOD PRESSURE: 57 MMHG | OXYGEN SATURATION: 95 % | HEART RATE: 78 BPM | RESPIRATION RATE: 12 BRPM | SYSTOLIC BLOOD PRESSURE: 96 MMHG | TEMPERATURE: 97.4 F

## 2018-02-06 VITALS
OXYGEN SATURATION: 98 % | SYSTOLIC BLOOD PRESSURE: 103 MMHG | DIASTOLIC BLOOD PRESSURE: 64 MMHG | RESPIRATION RATE: 12 BRPM | TEMPERATURE: 96.7 F | HEART RATE: 108 BPM

## 2018-02-06 VITALS
DIASTOLIC BLOOD PRESSURE: 54 MMHG | SYSTOLIC BLOOD PRESSURE: 102 MMHG | OXYGEN SATURATION: 95 % | RESPIRATION RATE: 12 BRPM | HEART RATE: 73 BPM | TEMPERATURE: 97.2 F

## 2018-02-06 VITALS — HEART RATE: 88 BPM

## 2018-02-06 VITALS
RESPIRATION RATE: 17 BRPM | SYSTOLIC BLOOD PRESSURE: 117 MMHG | TEMPERATURE: 98.2 F | DIASTOLIC BLOOD PRESSURE: 68 MMHG | OXYGEN SATURATION: 97 % | HEART RATE: 102 BPM

## 2018-02-06 VITALS
HEART RATE: 101 BPM | DIASTOLIC BLOOD PRESSURE: 60 MMHG | OXYGEN SATURATION: 94 % | SYSTOLIC BLOOD PRESSURE: 141 MMHG | TEMPERATURE: 97.2 F | RESPIRATION RATE: 14 BRPM

## 2018-02-06 VITALS
OXYGEN SATURATION: 94 % | DIASTOLIC BLOOD PRESSURE: 57 MMHG | HEART RATE: 79 BPM | RESPIRATION RATE: 13 BRPM | TEMPERATURE: 97.1 F | SYSTOLIC BLOOD PRESSURE: 100 MMHG

## 2018-02-06 VITALS — HEART RATE: 79 BPM

## 2018-02-06 VITALS — HEART RATE: 97 BPM

## 2018-02-06 VITALS — HEART RATE: 109 BPM

## 2018-02-06 VITALS — HEART RATE: 103 BPM

## 2018-02-06 VITALS — HEART RATE: 93 BPM

## 2018-02-06 LAB
BUN SERPL-MCNC: 26 MG/DL (ref 7–18)
CALCIUM SERPL-MCNC: 7.2 MG/DL (ref 8.5–10.1)
CALCIUM TP COR SERPL-MCNC: 8.2 MG/DL (ref 8.5–10.1)
CHLORIDE SERPL-SCNC: 99 MEQ/L (ref 98–107)
CREAT SERPL-MCNC: 3.65 MG/DL (ref 0.6–1.3)
ERYTHROCYTE [DISTWIDTH] IN BLOOD BY AUTOMATED COUNT: 22.9 % (ref 11.6–17.2)
GFR SERPLBLD BASED ON 1.73 SQ M-ARVRAT: 17 ML/MIN (ref 89–?)
GLUCOSE SERPL-MCNC: 117 MG/DL (ref 74–106)
HCO3 BLD-SCNC: 31.9 MEQ/L (ref 21–32)
HCT VFR BLD CALC: 23.1 % (ref 39–51)
HGB BLD-MCNC: 7.6 GM/DL (ref 13–17)
MAGNESIUM SERPL-MCNC: 2.1 MG/DL (ref 1.5–2.5)
MCH RBC QN AUTO: 33.5 PG (ref 27–34)
MCHC RBC AUTO-ENTMCNC: 33.1 % (ref 32–36)
MCV RBC AUTO: 101.3 FL (ref 80–100)
PHOSPHATE SERPL-MCNC: 3 MG/DL (ref 2.5–4.9)
PLATELET # BLD: 202 TH/MM3 (ref 150–450)
PMV BLD AUTO: 7.6 FL (ref 7–11)
PROT SERPL-MCNC: 5.2 GM/DL (ref 6.4–8.2)
RBC # BLD AUTO: 2.28 MIL/MM3 (ref 4.5–5.9)
SODIUM SERPL-SCNC: 139 MEQ/L (ref 136–145)
WBC # BLD AUTO: 5.8 TH/MM3 (ref 4–11)

## 2018-02-06 RX ADMIN — HUMAN INSULIN SCH: 100 INJECTION, SOLUTION SUBCUTANEOUS at 17:00

## 2018-02-06 RX ADMIN — PANTOPRAZOLE SCH MG: 40 TABLET, DELAYED RELEASE ORAL at 09:18

## 2018-02-06 RX ADMIN — CHLORHEXIDINE GLUCONATE SCH PACK: 500 CLOTH TOPICAL at 23:10

## 2018-02-06 RX ADMIN — HUMAN INSULIN SCH: 100 INJECTION, SOLUTION SUBCUTANEOUS at 08:00

## 2018-02-06 RX ADMIN — HEPARIN SODIUM SCH UNITS: 10000 INJECTION, SOLUTION INTRAVENOUS; SUBCUTANEOUS at 09:18

## 2018-02-06 RX ADMIN — MIDODRINE HYDROCHLORIDE SCH MG: 5 TABLET ORAL at 06:27

## 2018-02-06 RX ADMIN — MIDODRINE HYDROCHLORIDE SCH MG: 5 TABLET ORAL at 12:40

## 2018-02-06 RX ADMIN — MIDODRINE HYDROCHLORIDE SCH MG: 5 TABLET ORAL at 17:42

## 2018-02-06 RX ADMIN — HUMAN INSULIN SCH: 100 INJECTION, SOLUTION SUBCUTANEOUS at 21:00

## 2018-02-06 RX ADMIN — Medication SCH ML: at 09:18

## 2018-02-06 RX ADMIN — HEPARIN SODIUM SCH UNITS: 10000 INJECTION, SOLUTION INTRAVENOUS; SUBCUTANEOUS at 22:58

## 2018-02-06 RX ADMIN — CALCIUM CARBONATE (ANTACID) CHEW TAB 500 MG SCH MG: 500 CHEW TAB at 09:17

## 2018-02-06 RX ADMIN — CALCIUM CARBONATE (ANTACID) CHEW TAB 500 MG SCH MG: 500 CHEW TAB at 22:59

## 2018-02-06 RX ADMIN — Medication SCH ML: at 23:09

## 2018-02-06 RX ADMIN — STANDARDIZED SENNA CONCENTRATE AND DOCUSATE SODIUM SCH TAB: 8.6; 5 TABLET, FILM COATED ORAL at 22:59

## 2018-02-06 RX ADMIN — STANDARDIZED SENNA CONCENTRATE AND DOCUSATE SODIUM SCH TAB: 8.6; 5 TABLET, FILM COATED ORAL at 09:17

## 2018-02-06 RX ADMIN — FLUCONAZOLE SCH MG: 200 TABLET ORAL at 09:18

## 2018-02-06 RX ADMIN — CHLORHEXIDINE GLUCONATE SCH PACK: 500 CLOTH TOPICAL at 04:00

## 2018-02-06 RX ADMIN — HUMAN INSULIN SCH: 100 INJECTION, SOLUTION SUBCUTANEOUS at 12:00

## 2018-02-06 NOTE — HHI.NPPN
Subjective


General Problems:  Anemia


Renal Failure:  End Stage Renal Disease


History of Present Illness


Patient is a very pleasant 67 year old male who came into the emergency room on 

01/26/18 hypotensive, lethargic, and with a hemoglobin of 6.7.  He was 

transfused 2 units of PRBC's with a recheck pending.  He was initially put on 

Levophed for blood pressure support however it is currently not running. Has a 

past medical history of End -stage renal disease with peritoneal dialysis, AFIB

, diabetes, arthritis, chronic pain, and hx of strokes.    Onset of acute 

epigastric pain and discomfort noted for 5 days ago, but worsened over the past 

24 hours.  Reported loose stools for 3 weeks.  In the emergency room patient 

did report melena, nausea, mild vomiting undigested food yesterday, but denied 

any coffee ground emesis. Patient has AV fistula and will have dialysis while 

here.  Per patient PD catheter needs to be removed secondary to infection.


Additional Remarks


Patient is alert continues on Phenylephrine for blood pressure support dose has 

been decreased no SOB.


 (Gellermann,Diane M. ARNP)





Review of Systems


General


Constitutional:  Fatigue


General Remarks


weakness


 (Gellermann,Diane M. ARNP)





Respiratory


Respiratory Remarks


Denies SOB


 (Gellermann,Diane M. ARNP)





Cardiovascular


Cardiac Remarks


Denies CP


 (Gellermann,Diane M. ARNP)





Gastrointestinal


GI Remarks


Denies abdominal pain


 (Gellermann,Diane M. ARNP)





Objective Data


Data





Vital Signs








  Date Time  Temp Pulse Resp B/P (MAP) Pulse Ox O2 Delivery O2 Flow Rate FiO2


 


2/6/18 06:00  88      


 


2/6/18 04:00 97.4 78 12 96/57 (70) 95   


 


2/6/18 04:00  78      


 


2/6/18 02:00  79      


 


2/6/18 00:00 97.2 73 12 102/54 (70) 95   


 


2/6/18 00:00  73      


 


2/5/18 22:48  77      


 


2/5/18 20:00  71      


 


2/5/18 20:00 97.7 71 14 93/51 (65) 98   


 


2/5/18 19:12     95   21


 


2/5/18 18:00  81      


 


2/5/18 16:00  76      


 


2/5/18 16:00 97.5 76 15 123/63 (83) 93   


 


2/5/18 14:00  88      


 


2/5/18 12:00  76      


 


2/5/18 12:00 96.7 76 14 101/56 (71) 96   


 


2/5/18 10:00  76      








 (Gellermann,Diane M. ARNP)


-:  


2/6/18 0450                                                                    

            2/6/18 0450





Imaging





Last Impressions








Chest X-Ray 1/27/18 0000 Signed





Impressions: 





 Service Date/Time:  Saturday, January 27, 2018 03:15 - CONCLUSION:  Patchy 





 consolidation or atelectasis of the left lower lung.     Chucky Morton MD 








 (Gellermann,Diane M. ARNP)





Physical Exam


General


Appearance:  Malnourished


 (Gellermann,Diane M. ARNP)





Neck


Neck Exam:  Neck Supple


 (Gellermann,Diane M. ARNP)





Pulmonary


Resp Exam:  Clear Bilaterally, No Distress


 (Gellermann,Diane M. ARNP)





Cardiology


CV Exam:  Regular


 (Gellermann,Diane M. ARNP)





Gastrointestinal/Abdomen


GI Exam:  Soft, Non-Tender


 (Gellermann,Diane M. ARNP)





Genitourinary


 Exam:  Flank Non-Tender


 (Gellermann,Diane M. ARNP)





Extremeties


Extremities Exam:  No Edema


 (Gellermann,Diane M. ARNP)





Assessment/Plan


Problem List:  


(1) ESRD (end stage renal disease) on dialysis


ICD Codes:  N18.6 - End stage renal disease; Z99.2 - Dependence on renal 

dialysis


Plan:  Dialysis MWF


HD done done today.  1 .5 liters removed


Protein corrected calcium at 7.9 on oral replacement. 


HGB 7.6


Continue on midodrine 10 mg TID


Phenylephrine has been decreased and MAP is good. 


Dialysis in AM





(2) Peritonitis associated with peritoneal dialysis


ICD Codes:  T85.71XA - Infection and inflammatory reaction due to peritoneal 

dialysis catheter, initial encounter


Plan:  Peritoneal fluid culture positive for Candida. 


On fluconazole with stop date of the 13th


PD catheter removed


ID and surgery following. 





(3) Hypotension


ICD Codes:  I95.9 - Hypotension, unspecified


Status:  Acute


Plan:  Sepsis present on admission. 


Continue supportive care. 





(4) GI bleed


ICD Codes:  K92.2 - Gastrointestinal hemorrhage, unspecified


Status:  Acute


Plan:  s/p EGD: duodenal ulcer, gastritis, esophagitis. 


Received 2 units PRBC on admission.








(5) Anemia


ICD Codes:  D64.9 - Anemia, unspecified


Plan:  HGB 7.6 today


Epogen 10,000 units with each dialysis


 (Gellermann,Diane M. ARNP)


Problem List:  


(1) ESRD (end stage renal disease) on dialysis


ICD Codes:  N18.6 - End stage renal disease; Z99.2 - Dependence on renal 

dialysis


Plan:  Dialysis MWF


HD done done today.  1 .5 liters removed


Protein corrected calcium at 7.9 on oral replacement. 


HGB 7.6


Continue on midodrine 10 mg TID


Phenylephrine has been decreased and MAP is good. 


Dialysis in AM.


Patient seen and examined, agree with above.


Hgb. is low, on Epogen, transfuse 2 units with HD tomorrow.





(2) Peritonitis associated with peritoneal dialysis


ICD Codes:  T85.71XA - Infection and inflammatory reaction due to peritoneal 

dialysis catheter, initial encounter


Plan:  Peritoneal fluid culture positive for Candida. 


On fluconazole with stop date of the 13th


PD catheter removed


ID and surgery following. 





(3) Hypotension


ICD Codes:  I95.9 - Hypotension, unspecified


Status:  Acute


Plan:  Sepsis present on admission. 


Continue supportive care. 





(4) GI bleed


ICD Codes:  K92.2 - Gastrointestinal hemorrhage, unspecified


Status:  Acute


Plan:  s/p EGD: duodenal ulcer, gastritis, esophagitis. 


Received 2 units PRBC on admission.








(5) Anemia


ICD Codes:  D64.9 - Anemia, unspecified


Plan:  HGB 7.6 today


Epogen 10,000 units with each dialysis


 (Carlita Hoang MD)





Problem Qualifiers





(1) Hypotension:  


Qualified Codes:  I95.9 - Hypotension, unspecified


(2) GI bleed:  


Qualified Codes:  K92.2 - Gastrointestinal hemorrhage, unspecified


(3) Anemia:  








Gellermann,Diane M. ARNP Feb 6, 2018 09:53


Carlita Hoang MD Feb 6, 2018 14:31

## 2018-02-06 NOTE — HHI.CCPN
Subjective


Remarks/Hospital Course


67 year-old male with a medical history significant for end-stage renal disease 

on peritoneal dialysis who was noted to be severely hypotensive and lethargic 

at the dialysis center on 1/25 and 911 was called and patient was transferred 

to [facility] ER.  On arrival he was noted to be hypotensive with SBP 70s 

and heart rate 100s.  He also reportedly had significant melena and rectal 

bleeding following arrival.  A right IJ central line was placed by ER 

physician.  Patient's wife arrived and wanted to make patient DNR status and 

did not want blood transfusions and wished transferring to hospice however 

subsequently patient woke up and wanted to continue aggressive care including 

blood transfusions.  ER physician contacted me questioning patient's wife's 

ability to make decisions for him due to concern for secondary gain as well as 

possible psychiatric illness.  Patient was accepted for admission by critical 

care medicine service.  4 units PRBCs were ordered to be transfused stat by ER 

physician.  When I arrived to see the patient he was laying in the ER stretcher 

on nasal cannula on 5 mics of Levophed.  He is drowsy but easily arousable.  He 

knew he was at the hospital.


He could not give me details of his history.  He did tell me that Dr. Hoang is 

his nephrologist.





1/27 Patient is on Levophed 2 mics, s/p HD yesterday. Awake and alert on 

Protonix drip. s/p EGD yesterday showed 2 large Duodenal ulcer, esophagitis and 

gastritis.


1/28:  Afebrile.  Currently off norepinephrine but MAP is around 60 currently.  

States he is okay with removing peritoneal dialysis catheter.  Will discuss 

with general surgery in nephrology in a.m.


1/29:  Blood pressures are borderline.  Awake and alert and eating popsicles 

currently.  Patient's okay with removing peroneal dialysis catheter.  Dr. Koch as yet to see patient today.  Agree with ID and nephrology's 

recommendations for removal.


1/30 Patient s/p PD catheter removal this morning. On Levophed 2 mics. Afebrile.


1/31:  Postoperative day #1 PD catheter removal.  Remains on norepinephrine at 

2 g per minute.  Asymptomatic.  Plan for hemodialysis today.


2/1:  Afebrile.  Intermittently A. fib with RVR, currently rate controlled.  

Denies chest pain or shortness of breath.  Intermittently on phenylephrine drip


2/2:  Resting in bed in no acute distress.  Remains on Johny-Synephrine drip at 

60 mics grams per minute.  Appears more depressed today.  Status post -3 L of 

hemodialysis.





Subjective





2/3:  Resting in bed in no acute distress.  Afebrile.  Remains on low-dose 

phenylephrine at 50 mics grams per minute.  More attentive today.


2/4: No acute issues overnight.  Patient remains on low-dose phenylephrine 

currently at 40 mcgs/min.  Patient continues on Midrin 3 times a day.  

Hemoglobin stable.  Patient tolerating diet.


2/5: The patient continues on Phenylephrine infusion, now increased to 60 mcgs/

min.


2/6: Phenylephrine continues at 30mcg/min.  Patient up out of bed with physical 

therapy today.  IHD 3.5 L off yesterday





Objective





Vital Signs








  Date Time  Temp Pulse Resp B/P (MAP) Pulse Ox O2 Delivery O2 Flow Rate FiO2


 


2/6/18 10:00  97      


 


2/6/18 08:00 97.1  13 100/57 (71) 94   


 


2/5/18 19:12        21














Intake and Output   


 


 2/6/18 2/6/18 2/7/18





 08:00 16:00 00:00


 


Intake Total 120 ml  


 


Output Total 0 ml  


 


Balance 120 ml  








Result Diagram:  


2/6/18 0450                                                                    

            2/6/18 0450





Imaging





Last Impressions








Chest X-Ray 1/27/18 0000 Signed





Impressions: 





 Service Date/Time:  Saturday, January 27, 2018 03:15 - CONCLUSION:  Patchy 





 consolidation or atelectasis of the left lower lung.     Chucky Morton MD 








Last Impressions








Chest X-Ray 1/27/18 0000 Signed





Impressions: 





 Service Date/Time:  Saturday, January 27, 2018 03:15 - CONCLUSION:  Patchy 





 consolidation or atelectasis of the left lower lung.     Chucky Morton MD 








Objective Remarks


GENERAL: 67 year  male sitting up in bed eating lunch, in no apparent 

distress


SKIN: Woman dry.  No rash


HEAD: Atraumatic. Normocephalic. 


EYES:  No scleral icterus. No injection or drainage. 


ENT: No nasal bleeding or discharge.  Mucous membranes pink and dry..


NECK: Trachea midline.  Supple.


CARDIOVASCULAR: Heart rate in the 80s.  Irregularly irregular consistent with 

A. fib.


RESPIRATORY: No accessory muscle use. Clear to auscultation. Breath sounds 

equal bilaterally.  Decreased respiratory effort.


GASTROINTESTINAL: Abdomen soft, nondistended.  PD catheter has been removed.  

Site is clean dry and intact.  No signs erythema drainage.


MUSCULOSKELETAL: Patient has a well-healed rt heel ulcer.  Venous stasis 

changes noted to his bilateral lower extremities.


NEUROLOGICAL: Awake and weak and mildly confused. No obvious cranial nerve 

deficits.  Moves bilateral upper extremities.


BACK: Stage I/early decubitus in his sacral area.


Date of Insertion:  Jan 25, 2018


Line:  Central Venous Catheter


Side:  Right


Location:  Internal, Jugular





A/P


Assessment and Plan


Neuro/Psych: 





Depression


Chronic Pain syndrome


History of CVA 1986/2007





Holding paroxetine 10 mg daily/home medication.  Resume when clinically 

indicated


Avoid sedatives and narcotics including tramadol/home medication, monitor neuro 

status. Awake and alert


Holding clopidogrel 75 mg daily.  Resume when okay with GI





CV: 





Atrial fibrillation rate controlled


History of essential hypertension





On  phenylephrine at 30  g per minute- Monitor HR and BP keep MAP >65mmHg


Lactic acid 2.2 on 1/27


Holding clopidogrel 75 mg daily light of duodenal ulcer


Holding lisinopril 20 mill grams daily, Imdur and metoprolol 50 mill grams 

daily in light of hypotension


random Cortisol level.  20


Continue Midodrine 10 mg 3 times a day








Pulm: 





Continue with oxygen keep sat >92%.  Currently on room air


Albuterol aerosols every 2 hours when necessary dyspnea





GI/liver: 





Upper GI bleed secondary to duodenal ulcer





Currently on soft renal diet


On pantoprazole 40 mg by mouth daily


s/p EGD 1/26 which showed 2 large duodenal ulcers, grade D esophagitis, 

gastritis.





Renal/: 





Status post PD catheter removal day #4 Dr. Juarez


End-stage renal disease on hemodialysis





Monitor renal function, I/O's, avoid nephrotoxins.


Pathology Dr. Hoang.  General Surgery has followed- Dr. Juarez fungal 

infection requiring PD catheter removal on 1/30. 





Heme:





Macrocytic anemia





Monitor CBC, s/p transfusion 2units PRBC on 1/26


Hgb stable, continue to monitor





Endocrine: 





Diabetes mellitus


Elevated TSH - repeat in 3-6 weeks.  Free T4 normal.  Free T3 low.  Would not 

initiate any levothyroxine at this time in this acute clinical setting





SSI for glycemic control with low Novulog before meals at bedtime


Holding Tradjenta 5 mg daily





ID: 





Infected peritoneal dialysis catheter/peritonitis fungal





Continue fluconazole 200 mg po daily docusate 2/13


Monitor for signs of infections ( Fever, WBC) follow up on cultures.  

Peritoneal fluid 1/26 positive for yeast/Candida


ID is following.





MSK:  





Right heel ulcer


Sacral decubitus ulcer





2/4 Wound care consulted evaluate and treat


2/4 Transfer pt on Radha bed





Prophylaxis: Pantoprazole/heparin subcutaneous





Lines: Right IJ CVP placed 1/25





Level II follow-up


Case management consulted regarding disposition possible transfer to select 

specialty hospital


Physician


Miriam Barahona MD Feb 6, 2018 12:57

## 2018-02-06 NOTE — HHI.HCPN
Reason for visit


   a.  To assist with evaluation and management of symptoms including: weakness

, chronic pain 


   b.  To assist medical decision maker(s) with: better understanding of 

current medical conditions; weighing benefits/burdens of medical treatment 

options; making        


        medical treatment decisions.





Subjective/Interval History


Pt seen today to follow up on comfort, goals. 





Patient remains on ICU, unable to wean off of Johny-Synephrine for hypotension.  

Ongoing hemodialysis for renal failure.  H&H remains low, stable 7.5/23.1.  

Planned for transfusion 2 units RBCs tomorrow with hemodialysis per nephrology.

  Continues on fluconazole for candida infection at peritoneal dialysis 

catheter site.  Discharge planning for possible LTAC placement.





Patient seen in room no visitors present.  He is initially sleeping though 

arouses easily.  He has mostly oriented, though at times forgetful.  He 

remembers me from prior visits but can't remember my name.  He endorses "

feeling pretty good ".  With conversation he appears slightly short of breath 

and he denies dyspnea subjectively.  Pale, ashen color.  Explore with him 

hospitalization and that he continues to require medication for his blood 

pressure.  He recalls initial acute admission and transfusion however he feels 

he has improved overall since admission.  He tells me he just wants tibial to 

walk again.  I gently explored with him that given prolonged hospitalization 

this course and recent hospitalization and that he has been bedbound since 

December 25 it is unlikely that his walking function will be restored.  Further 

explore he does remain at risk for ongoing complications.  Ask if he would want 

continued hospitalizations and aggressive intervention short of resuscitation ,

he indicates that he would want to keep coming to the hospital if they could 

"keep helping him get better."  Offered to call wife and provide update he 

indicates she has been coming in and has been updated.  He denies pain.  

Reports good appetite, except that he is tired of eating chicken.  Has been 

eating % of meals.








Discussed with primary nurse, critical care.





.





Advance Directives


Living Will:  Never completed


Health Care Surrogate:  Copy in medical record


Durable Power of :  Copy in medical record


Advance Directive Specifics


Date completed:


1/21/18


Health Care Surrogate(s):


Healthcare surrogate document [1/21/18] actually names the patient as 

healthcare surrogate. durable power of  does not include healthcare 

decision making.





Objective





Vital Signs








  Date Time  Temp Pulse Resp B/P (MAP) Pulse Ox O2 Delivery O2 Flow Rate FiO2


 


2/6/18 14:00  109      


 


2/6/18 12:00 96.7 108 12 103/64 (77) 98   


 


2/6/18 12:00  108      


 


2/6/18 10:00  97      


 


2/6/18 08:00 97.1 79 13 100/57 (71) 94   


 


2/6/18 08:00  79      


 


2/6/18 06:00  88      


 


2/6/18 04:00 97.4 78 12 96/57 (70) 95   


 


2/6/18 04:00  78      


 


2/6/18 02:00  79      


 


2/6/18 00:00 97.2 73 12 102/54 (70) 95   


 


2/6/18 00:00  73      


 


2/5/18 22:48  77      


 


2/5/18 20:00  71      


 


2/5/18 20:00 97.7 71 14 93/51 (65) 98   


 


2/5/18 19:12     95   21


 


2/5/18 18:00  81      














Intake & Output  


 


 2/6/18 2/6/18





 07:00 19:00


 


Intake Total 120 ml 


 


Output Total 0 ml 


 


Balance 120 ml 


 


  


 


Intake Oral 120 ml 


 


Output Urine Total 0 ml 


 


# Bowel Movements 1 








Physical Exam


CONSTITUTIONAL/GENERAL: Frail. Slight temporal wasting. In no apparent 

distress. 


TUBES/LINES/DRAINS: Central line rt IJ.


SKIN: Pale, ashen color.  Scattered petechia/ecchymosis on upper extremities. 

Chronic venous insufficiency of lower extremities. Ulcers on plantar aspect of 

left foot and right great toe.  


NECK: Trachea midline. Supple, nontender. No palpable thyroid enlargement or 

nodularity. 


CARDIOVASCULAR: Irregular rate and irregular rhythm without murmur. No JVD. 

Peripheral pulses symmetric.


RESPIRATORY/CHEST: Mild shortness of breath with conversation.  Lungs are 

clear.  Breath sounds equal bilaterally.    


GASTROINTESTINAL: Abdomen soft, non-tender, nondistended. No hepato-

splenomegaly. No guarding. dressing abdomen clean dry. 


MUSCULOSKELETAL: Atrophy in bilateral lower extremities. Extremities without 

clubbing, cyanosis, or edema.+ chronic vascular skin changes BLE. No joint 

effusion, erythema or tenderness to exam bilateral knees 


NEUROLOGICAL: Awake and alert. Oriented x 2-3.  limited insight.  Forgetful at 

times. Follows commands. moves all 4 extremities. 


PSYCHIATRIC:. No obvious anxiety/depression. 





.





Diagnostic Tests


Laboratory





Laboratory Tests








Test


  2/4/18


03:56 2/5/18


04:50 2/6/18


04:50


 


White Blood Count


  7.3 TH/MM3


(4.0-11.0) 6.2 TH/MM3


(4.0-11.0) 5.8 TH/MM3


(4.0-11.0)


 


Red Blood Count


  2.38 MIL/MM3


(4.50-5.90) 2.24 MIL/MM3


(4.50-5.90) 2.28 MIL/MM3


(4.50-5.90)


 


Hemoglobin


  7.9 GM/DL


(13.0-17.0) 7.5 GM/DL


(13.0-17.0) 7.6 GM/DL


(13.0-17.0)


 


Hematocrit


  24.0 %


(39.0-51.0) 22.8 %


(39.0-51.0) 23.1 %


(39.0-51.0)


 


Mean Corpuscular Volume


  101.0 FL


(80.0-100.0) 101.9 FL


(80.0-100.0) 101.3 FL


(80.0-100.0)


 


Mean Corpuscular Hemoglobin


  33.4 PG


(27.0-34.0) 33.3 PG


(27.0-34.0) 33.5 PG


(27.0-34.0)


 


Mean Corpuscular Hemoglobin


Concent 33.1 %


(32.0-36.0) 32.7 %


(32.0-36.0) 33.1 %


(32.0-36.0)


 


Red Cell Distribution Width


  21.9 %


(11.6-17.2) 22.8 %


(11.6-17.2) 22.9 %


(11.6-17.2)


 


Platelet Count


  201 TH/MM3


(150-450) 217 TH/MM3


(150-450) 202 TH/MM3


(150-450)


 


Mean Platelet Volume


  7.8 FL


(7.0-11.0) 7.6 FL


(7.0-11.0) 7.6 FL


(7.0-11.0)


 


Neutrophils (%) (Auto)


  83.7 %


(16.0-70.0) 


  


 


 


Lymphocytes (%) (Auto)


  7.8 %


(9.0-44.0) 


  


 


 


Monocytes (%) (Auto)


  7.2 %


(0.0-8.0) 


  


 


 


Eosinophils (%) (Auto)


  0.6 %


(0.0-4.0) 


  


 


 


Basophils (%) (Auto)


  0.7 %


(0.0-2.0) 


  


 


 


Neutrophils # (Auto)


  6.1 TH/MM3


(1.8-7.7) 


  


 


 


Lymphocytes # (Auto)


  0.6 TH/MM3


(1.0-4.8) 


  


 


 


Monocytes # (Auto)


  0.5 TH/MM3


(0-0.9) 


  


 


 


Eosinophils # (Auto)


  0.0 TH/MM3


(0-0.4) 


  


 


 


Basophils # (Auto)


  0.0 TH/MM3


(0-0.2) 


  


 


 


CBC Comment DIFF FINAL   


 


Differential Comment    


 


Blood Urea Nitrogen


  26 MG/DL


(7-18) 40 MG/DL


(7-18) 26 MG/DL


(7-18)


 


Creatinine


  3.90 MG/DL


(0.60-1.30) 4.78 MG/DL


(0.60-1.30) 3.65 MG/DL


(0.60-1.30)


 


Random Glucose


  141 MG/DL


() 141 MG/DL


() 117 MG/DL


()


 


Total Protein


  4.9 GM/DL


(6.4-8.2) 5.2 GM/DL


(6.4-8.2) 5.2 GM/DL


(6.4-8.2)


 


Albumin


  2.2 GM/DL


(3.4-5.0) 


  


 


 


Calcium Level


  7.1 MG/DL


(8.5-10.1) 6.9 MG/DL


(8.5-10.1) 7.2 MG/DL


(8.5-10.1)


 


Phosphorus Level


  2.8 MG/DL


(2.5-4.9) 3.7 MG/DL


(2.5-4.9) 3.0 MG/DL


(2.5-4.9)


 


Magnesium Level


  2.1 MG/DL


(1.5-2.5) 2.1 MG/DL


(1.5-2.5) 2.1 MG/DL


(1.5-2.5)


 


Alkaline Phosphatase


  231 U/L


() 


  


 


 


Aspartate Amino Transf


(AST/SGOT) 15 U/L (15-37) 


  


  


 


 


Alanine Aminotransferase


(ALT/SGPT) 12 U/L (12-78) 


  


  


 


 


Total Bilirubin


  0.7 MG/DL


(0.2-1.0) 


  


 


 


Sodium Level


  137 MEQ/L


(136-145) 137 MEQ/L


(136-145) 139 MEQ/L


(136-145)


 


Potassium Level


  4.4 MEQ/L


(3.5-5.1) 4.9 MEQ/L


(3.5-5.1) 4.1 MEQ/L


(3.5-5.1)


 


Chloride Level


  100 MEQ/L


() 98 MEQ/L


() 99 MEQ/L


()


 


Carbon Dioxide Level


  32.1 MEQ/L


(21.0-32.0) 28.4 MEQ/L


(21.0-32.0) 31.9 MEQ/L


(21.0-32.0)


 


Anion Gap


  5 MEQ/L (5-15) 


  11 MEQ/L


(5-15) 8 MEQ/L (5-15) 


 


 


Estimat Glomerular Filtration


Rate 16 ML/MIN


(>89) 12 ML/MIN


(>89) 17 ML/MIN


(>89)


 


Protein Corrected Calcium


  8.3 MG/DL


(8.5-10.1) 7.9 MG/DL


(8.5-10.1) 8.2 MG/DL


(8.5-10.1)








Result Diagram:  


2/6/18 0450                                                                    

            2/6/18 0450





Imaging





Last Impressions








Chest X-Ray 1/27/18 0000 Signed





Impressions: 





 Service Date/Time:  Saturday, January 27, 2018 03:15 - CONCLUSION:  Patchy 





 consolidation or atelectasis of the left lower lung.     Chucky Morton MD 








Procedures


1/26/18 EGD





Assessment and Plan


Disease Oriented Problem List:  


(1) Atrial fibrillation


(2) Sacral decubitus ulcer


(3) Heel ulceration


(4) Hypotension


(5) GI bleed


(6) Anemia


(7) ESRD (end stage renal disease) on dialysis


Symptom Scale:  


(1) Weakness


0-10 Scale:  Unable to quantify





Pertinent Non-Medical Issues


Psychosocial: to his wife x 6 yrs. Has 1 adult son from prior marriage, 

who is incarcerated in [place]. He communicates with him every Sunday (son 

calls him). 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 His 

mother is still living there, and a sister. Moved to [place] in his 50s for 

[employer]. 


Spiritual:no particular affiliation, does not want  support


Legal:Patient has a healthcare surrogate document in his chart however this 

document actually names the patient as designated healthcare surrogate.  Per 

[place] statutes his wife would be appropriate legal proxy if patient 

incapacitated. 


1/26/18 assisted pt to complete new HCS, names his wife as HCS.


Ethical issues impacting care:


Important Contacts


spouse Yessy Meehan [phone]


.


Prognosis


This patient was admitted for hypotension, significant anemia, GI bleed.  He 

has known history of dialysis dependent end-stage renal disease.  Ongoing 

diagnostics, GI evaluation.  With ongoing aggressive treatments and 

interventions may be able to recover from current acute conditions.





.


Code Status:  Full Code


Plan





* Legal decision maker:Patient has a healthcare surrogate document in his chart 

however this document actually names the patient as designated healthcare 

surrogate.  Per [place] statutes his wife would be appropriate legal proxy if 

patient incapacitated. Pt at time of my exam is oriented, appropriate and 

appears to have fair insight to his conditions/options. Appears able to make 

his own decisions . 1/26/18 assisted pt to complete new HCS, names his wife as 

HCS.





* Pt reports multiple recent hospitalizations/ER visits at [place] [facility] , 

recommend RECORDS REQUEST from [facility] 


   


* Goals: Pt goals remain aggressive short of resuscitation. 


   


* CODE STATUS: DNR 


   


* SYMPTOMS:


   --weakness/debility- ongoing since Dec 25th. Limited to no ambulation since 

that time. Rec cont OT/PT to maintain/improve functional status


   --pain- today pt reports Rt knee pain, exam benign. pt wife reports he has 

chronic neuropathic sounding pain to BLE [place] at night, and chronic pain to 

bilateral chronic foot wounds. He has been on norco 5mg at home.  Pt hypotensive

, requiring pressor, cautious use of opiates. Consider resuming home norco or 

tramadol PRN . 





* Palliative care will continue to follow during hospital course as condition 

evolves, to assist patient/decision-maker with understanding of medical 

conditions, weighing benefits/burdens of treatment options, for clarification 

of goals of treatment.  Additionally will assist with any symptoms of 

palliative concern





Attestation


To help prompt me to consider important information that might be impacting 

today's encounter and assessment, information from prior notes written by 

myself or my colleagues may have been "brought forward" into today's note.  My 

signature on this note, however, is an attestation that I personally performed 

the exam, history, and/or decision-making noted today, and, unless otherwise 

indicated, the interactions with patient, family, and staff as well as the 

review of records all occurred today.  I also attest that the listed assessment 

and stated plan reflect my best clinical judgment today based on the 

combination of historical information, prior notes, and today's exam/ 

interactions.  When time spent is documented, it refers only to time spent 

today by the signer, or if indicated, combined time spent today by 

collaborating physician/nurse practitioner.











Leandra Sparks Feb 6, 2018 16:26

## 2018-02-07 VITALS
SYSTOLIC BLOOD PRESSURE: 102 MMHG | RESPIRATION RATE: 13 BRPM | TEMPERATURE: 98.3 F | HEART RATE: 95 BPM | DIASTOLIC BLOOD PRESSURE: 62 MMHG | OXYGEN SATURATION: 100 %

## 2018-02-07 VITALS
DIASTOLIC BLOOD PRESSURE: 55 MMHG | HEART RATE: 95 BPM | OXYGEN SATURATION: 94 % | RESPIRATION RATE: 20 BRPM | TEMPERATURE: 97.9 F | SYSTOLIC BLOOD PRESSURE: 120 MMHG

## 2018-02-07 VITALS — OXYGEN SATURATION: 100 %

## 2018-02-07 VITALS — DIASTOLIC BLOOD PRESSURE: 63 MMHG | SYSTOLIC BLOOD PRESSURE: 100 MMHG | RESPIRATION RATE: 30 BRPM | HEART RATE: 105 BPM

## 2018-02-07 VITALS — HEART RATE: 99 BPM | OXYGEN SATURATION: 91 % | RESPIRATION RATE: 26 BRPM

## 2018-02-07 VITALS
OXYGEN SATURATION: 97 % | SYSTOLIC BLOOD PRESSURE: 110 MMHG | DIASTOLIC BLOOD PRESSURE: 73 MMHG | HEART RATE: 111 BPM | TEMPERATURE: 97.5 F | RESPIRATION RATE: 26 BRPM

## 2018-02-07 VITALS — HEART RATE: 103 BPM

## 2018-02-07 VITALS
DIASTOLIC BLOOD PRESSURE: 60 MMHG | TEMPERATURE: 97.9 F | OXYGEN SATURATION: 97 % | RESPIRATION RATE: 19 BRPM | SYSTOLIC BLOOD PRESSURE: 100 MMHG | HEART RATE: 86 BPM

## 2018-02-07 VITALS — RESPIRATION RATE: 18 BRPM | HEART RATE: 99 BPM | SYSTOLIC BLOOD PRESSURE: 88 MMHG | DIASTOLIC BLOOD PRESSURE: 59 MMHG

## 2018-02-07 VITALS
RESPIRATION RATE: 23 BRPM | DIASTOLIC BLOOD PRESSURE: 69 MMHG | OXYGEN SATURATION: 99 % | HEART RATE: 106 BPM | SYSTOLIC BLOOD PRESSURE: 114 MMHG

## 2018-02-07 VITALS
DIASTOLIC BLOOD PRESSURE: 61 MMHG | OXYGEN SATURATION: 98 % | SYSTOLIC BLOOD PRESSURE: 106 MMHG | TEMPERATURE: 97.8 F | RESPIRATION RATE: 21 BRPM | HEART RATE: 116 BPM

## 2018-02-07 VITALS
SYSTOLIC BLOOD PRESSURE: 98 MMHG | DIASTOLIC BLOOD PRESSURE: 59 MMHG | HEART RATE: 60 BPM | TEMPERATURE: 98.4 F | RESPIRATION RATE: 15 BRPM

## 2018-02-07 VITALS — HEART RATE: 101 BPM

## 2018-02-07 VITALS — OXYGEN SATURATION: 97 %

## 2018-02-07 VITALS — DIASTOLIC BLOOD PRESSURE: 67 MMHG | HEART RATE: 118 BPM | SYSTOLIC BLOOD PRESSURE: 103 MMHG | RESPIRATION RATE: 15 BRPM

## 2018-02-07 VITALS — HEART RATE: 88 BPM

## 2018-02-07 VITALS — SYSTOLIC BLOOD PRESSURE: 94 MMHG | DIASTOLIC BLOOD PRESSURE: 58 MMHG | HEART RATE: 120 BPM | RESPIRATION RATE: 14 BRPM

## 2018-02-07 VITALS — HEART RATE: 100 BPM

## 2018-02-07 VITALS — HEART RATE: 89 BPM

## 2018-02-07 LAB
BUN SERPL-MCNC: 36 MG/DL (ref 7–18)
CALCIUM SERPL-MCNC: 6.5 MG/DL (ref 8.5–10.1)
CALCIUM TP COR SERPL-MCNC: 7.5 MG/DL (ref 8.5–10.1)
CHLORIDE SERPL-SCNC: 100 MEQ/L (ref 98–107)
CREAT SERPL-MCNC: 4.13 MG/DL (ref 0.6–1.3)
GFR SERPLBLD BASED ON 1.73 SQ M-ARVRAT: 15 ML/MIN (ref 89–?)
GLUCOSE SERPL-MCNC: 105 MG/DL (ref 74–106)
HCO3 BLD-SCNC: 30.1 MEQ/L (ref 21–32)
MAGNESIUM SERPL-MCNC: 2.1 MG/DL (ref 1.5–2.5)
PHOSPHATE SERPL-MCNC: 3.4 MG/DL (ref 2.5–4.9)
PROT SERPL-MCNC: 5 GM/DL (ref 6.4–8.2)
SODIUM SERPL-SCNC: 139 MEQ/L (ref 136–145)

## 2018-02-07 RX ADMIN — HUMAN INSULIN SCH: 100 INJECTION, SOLUTION SUBCUTANEOUS at 16:20

## 2018-02-07 RX ADMIN — ACETAMINOPHEN PRN MG: 325 TABLET ORAL at 05:56

## 2018-02-07 RX ADMIN — CALCITRIOL SCH MCG: 0.25 CAPSULE, GELATIN COATED ORAL at 11:52

## 2018-02-07 RX ADMIN — CALCIUM CARBONATE (ANTACID) CHEW TAB 500 MG SCH MG: 500 CHEW TAB at 21:03

## 2018-02-07 RX ADMIN — HUMAN INSULIN SCH: 100 INJECTION, SOLUTION SUBCUTANEOUS at 21:00

## 2018-02-07 RX ADMIN — HEPARIN SODIUM SCH UNITS: 10000 INJECTION, SOLUTION INTRAVENOUS; SUBCUTANEOUS at 21:03

## 2018-02-07 RX ADMIN — HEPARIN SODIUM SCH UNITS: 10000 INJECTION, SOLUTION INTRAVENOUS; SUBCUTANEOUS at 10:45

## 2018-02-07 RX ADMIN — STANDARDIZED SENNA CONCENTRATE AND DOCUSATE SODIUM SCH TAB: 8.6; 5 TABLET, FILM COATED ORAL at 10:46

## 2018-02-07 RX ADMIN — MIDODRINE HYDROCHLORIDE SCH MG: 5 TABLET ORAL at 05:56

## 2018-02-07 RX ADMIN — FLUCONAZOLE SCH MG: 200 TABLET ORAL at 10:45

## 2018-02-07 RX ADMIN — PANTOPRAZOLE SCH MG: 40 TABLET, DELAYED RELEASE ORAL at 10:45

## 2018-02-07 RX ADMIN — STANDARDIZED SENNA CONCENTRATE AND DOCUSATE SODIUM SCH TAB: 8.6; 5 TABLET, FILM COATED ORAL at 21:03

## 2018-02-07 RX ADMIN — MIDODRINE HYDROCHLORIDE SCH MG: 5 TABLET ORAL at 11:52

## 2018-02-07 RX ADMIN — CALCIUM CARBONATE (ANTACID) CHEW TAB 500 MG SCH MG: 500 CHEW TAB at 10:45

## 2018-02-07 RX ADMIN — HUMAN INSULIN SCH: 100 INJECTION, SOLUTION SUBCUTANEOUS at 11:53

## 2018-02-07 RX ADMIN — HUMAN INSULIN SCH: 100 INJECTION, SOLUTION SUBCUTANEOUS at 08:00

## 2018-02-07 RX ADMIN — MIDODRINE HYDROCHLORIDE SCH MG: 5 TABLET ORAL at 16:13

## 2018-02-07 RX ADMIN — ACETAMINOPHEN PRN MG: 325 TABLET ORAL at 21:03

## 2018-02-07 RX ADMIN — Medication SCH ML: at 10:47

## 2018-02-07 RX ADMIN — Medication SCH ML: at 21:04

## 2018-02-07 NOTE — HHI.NPPN
Subjective


General Problems:  Anemia


Renal Failure:  End Stage Renal Disease


History of Present Illness


Patient is a very pleasant 67 year old male who came into the emergency room on 

01/26/18 hypotensive, lethargic, and with a hemoglobin of 6.7.  He was 

transfused 2 units of PRBC's with a recheck pending.  He was initially put on 

Levophed for blood pressure support however it is currently not running. Has a 

past medical history of End -stage renal disease with peritoneal dialysis, AFIB

, diabetes, arthritis, chronic pain, and hx of strokes.    Onset of acute 

epigastric pain and discomfort noted for 5 days ago, but worsened over the past 

24 hours.  Reported loose stools for 3 weeks.  In the emergency room patient 

did report melena, nausea, mild vomiting undigested food yesterday, but denied 

any coffee ground emesis. Patient has AV fistula and will have dialysis while 

here.  Per patient PD catheter needs to be removed secondary to infection.


Additional Remarks


Patient is alert seen during dialysis, pressors off since 3 am.  Receiving 2 

units of PRBC with dialysis.


 (Gellermann,Diane M. ARNP)





Review of Systems


General


Constitutional:  Fatigue


General Remarks


weakness


 (Gellermann,Diane M. ARNP)





Ears, Nose, & Throat


ENT Remarks


Dry mouth


 (Gellermann,Diane M. ARNP)





Respiratory


Respiratory Remarks


Denies SOB


 (Gellermann,Diane M. ARNP)





Cardiovascular


Cardiac Remarks


Denies CP


 (Gellermann,Diane M. ARNP)





Gastrointestinal


GI Remarks


Denies abdominal pain


 (Gellermann,Diane M. ARNP)





Objective Data


Data











 2/7/18 2/8/18





 19:00 07:00


 


Intake Total 410 ml 


 


Balance 410 ml 


 


  


 


Packed Cells 400 ml 


 


Blood Product IV Normal Saline Flush 10 ml 











Vital Signs








  Date Time  Temp Pulse Resp B/P (MAP) Pulse Ox O2 Delivery O2 Flow Rate FiO2


 


2/7/18 08:00 98.4 103 15 98/59 (72)    


 


2/7/18 08:00  60      


 


2/7/18 06:00  103      


 


2/7/18 04:00  116      


 


2/7/18 04:00 97.8 116 21 106/61 (76) 98   


 


2/7/18 03:53  101  108/60    


 


2/7/18 03:00  102  101/59    


 


2/7/18 02:00  89      


 


2/7/18 00:00  95      


 


2/7/18 00:00 97.9 95 20 120/55 (76) 94   


 


2/6/18 22:00  93      


 


2/6/18 21:00  102  105/58    


 


2/6/18 20:00 98.2 102 17 117/68 (84) 97   


 


2/6/18 20:00  102      


 


2/6/18 20:00  102  117/68    


 


2/6/18 18:00  103      


 


2/6/18 16:00 97.2 101 14 102/60 (74) 94   


 


2/6/18 16:00  101      


 


2/6/18 14:00  109      


 


2/6/18 12:00 96.7 108 12 103/64 (77) 98   


 


2/6/18 12:00  108      








 (Gellermann,Diane M. ARNP)


-:  


2/6/18 0450                                                                    

            2/7/18 0350








Physical Exam


General


Appearance:  Malnourished


 (Gellermann,Diane M. ARNP)





Neck


Neck Exam:  Neck Supple


 (Gellermann,Diane M. ARNP)





Pulmonary


Resp Exam:  Clear Bilaterally, No Distress


 (Gellermann,Diane M. ARNP)





Cardiology


CV Exam:  Regular


 (Gellermann,Diane M. ARNP)





Gastrointestinal/Abdomen


GI Exam:  Soft, Non-Tender


 (Gellermann,Diane M. ARNP)





Genitourinary


 Exam:  Flank Non-Tender


 (Gellermann,Diane M. ARNP)





Extremeties


Extremities Exam:  No Edema


 (Gellermann,Diane M. ARNP)





Assessment/Plan


Problem List:  


(1) ESRD (end stage renal disease) on dialysis


ICD Codes:  N18.6 - End stage renal disease; Z99.2 - Dependence on renal 

dialysis


Plan:  Dialysis MWF


Protein corrected calcium at 7.5 today IV calcium gluconate given and on oral 

replacement. 


HGB 7.6 yesterday  transfusing 2 units of PRBC with dialysis on epogen with 

dialysis


Continue on midodrine 10 mg TID 


Seen during dialysis


On pressors and plans to transfer out of ICU





(2) Peritonitis associated with peritoneal dialysis


ICD Codes:  T85.71XA - Infection and inflammatory reaction due to peritoneal 

dialysis catheter, initial encounter


Plan:  Peritoneal fluid culture positive for Candida. 


On fluconazole with stop date of the 13th


PD catheter removed


ID and surgery following. 





(3) Hypotension


ICD Codes:  I95.9 - Hypotension, unspecified


Status:  Acute


Plan:  Sepsis present on admission. 


Continue supportive care. 





(4) GI bleed


ICD Codes:  K92.2 - Gastrointestinal hemorrhage, unspecified


Status:  Acute


Plan:  s/p EGD: duodenal ulcer, gastritis, esophagitis. 


Received 2 units PRBC on admission.








(5) Anemia


ICD Codes:  D64.9 - Anemia, unspecified


Plan:  HGB 7.6 today


Epogen 10,000 units with each dialysis


Transfusing 2 units of PRBC


 (Gellermann,Diane M. ARNP)


Problem List:  


(1) ESRD (end stage renal disease) on dialysis


ICD Codes:  N18.6 - End stage renal disease; Z99.2 - Dependence on renal 

dialysis


Plan:  Dialysis MWF


Protein corrected calcium at 7.5 today IV calcium gluconate given and on oral 

replacement. 


HGB 7.6 yesterday  transfusing 2 units of PRBC with dialysis on epogen with 

dialysis


Continue on midodrine 10 mg TID 


Seen during dialysis


Off pressors now and plans to transfer out of ICU.


Add Rocaltrol 0.25 ug for low Calcium.





(2) Peritonitis associated with peritoneal dialysis


ICD Codes:  T85.71XA - Infection and inflammatory reaction due to peritoneal 

dialysis catheter, initial encounter


Plan:  Peritoneal fluid culture positive for Candida. 


On fluconazole with stop date of the 13th


PD catheter removed


ID and surgery following. 





(3) Hypotension


ICD Codes:  I95.9 - Hypotension, unspecified


Status:  Acute


Plan:  Sepsis present on admission. 


Continue supportive care. 





(4) GI bleed


ICD Codes:  K92.2 - Gastrointestinal hemorrhage, unspecified


Status:  Acute


Plan:  s/p EGD: duodenal ulcer, gastritis, esophagitis. 


Received 2 units PRBC on admission.








(5) Anemia


ICD Codes:  D64.9 - Anemia, unspecified


Plan:  HGB 7.6 today


Epogen 10,000 units with each dialysis


Transfusing 2 units of PRBC


 (Carlita Hoang MD)





Problem Qualifiers





(1) Hypotension:  


Qualified Codes:  I95.9 - Hypotension, unspecified


(2) GI bleed:  


Qualified Codes:  K92.2 - Gastrointestinal hemorrhage, unspecified


(3) Anemia:  








Gellermann,Diane M. ARNP Feb 7, 2018 11:05


Carlita Hoang MD Feb 7, 2018 11:41

## 2018-02-07 NOTE — HHI.CCPN
Subjective


Remarks/Hospital Course


67 year-old male with a medical history significant for end-stage renal disease 

on peritoneal dialysis who was noted to be severely hypotensive and lethargic 

at the dialysis center on 1/25 and 911 was called and patient was transferred 

to [facility] ER.  On arrival he was noted to be hypotensive with SBP 70s 

and heart rate 100s.  He also reportedly had significant melena and rectal 

bleeding following arrival.  A right IJ central line was placed by ER 

physician.  Patient's wife arrived and wanted to make patient DNR status and 

did not want blood transfusions and wished transferring to hospice however 

subsequently patient woke up and wanted to continue aggressive care including 

blood transfusions.  ER physician contacted me questioning patient's wife's 

ability to make decisions for him due to concern for secondary gain as well as 

possible psychiatric illness.  Patient was accepted for admission by critical 

care medicine service.  4 units PRBCs were ordered to be transfused stat by ER 

physician.  When I arrived to see the patient he was laying in the ER stretcher 

on nasal cannula on 5 mics of Levophed.  He is drowsy but easily arousable.  He 

knew he was at the hospital.


He could not give me details of his history.  He did tell me that Dr. Hoang is 

his nephrologist.





1/27 Patient is on Levophed 2 mics, s/p HD yesterday. Awake and alert on 

Protonix drip. s/p EGD yesterday showed 2 large Duodenal ulcer, esophagitis and 

gastritis.


1/28:  Afebrile.  Currently off norepinephrine but MAP is around 60 currently.  

States he is okay with removing peritoneal dialysis catheter.  Will discuss 

with general surgery in nephrology in a.m.


1/29:  Blood pressures are borderline.  Awake and alert and eating popsicles 

currently.  Patient's okay with removing peroneal dialysis catheter.  Dr. Koch as yet to see patient today.  Agree with ID and nephrology's 

recommendations for removal.


1/30 Patient s/p PD catheter removal this morning. On Levophed 2 mics. Afebrile.


1/31:  Postoperative day #1 PD catheter removal.  Remains on norepinephrine at 

2 g per minute.  Asymptomatic.  Plan for hemodialysis today.


2/1:  Afebrile.  Intermittently A. fib with RVR, currently rate controlled.  

Denies chest pain or shortness of breath.  Intermittently on phenylephrine drip


2/2:  Resting in bed in no acute distress.  Remains on Johny-Synephrine drip at 

60 mics grams per minute.  Appears more depressed today.  Status post -3 L of 

hemodialysis.





Subjective





2/3:  Resting in bed in no acute distress.  Afebrile.  Remains on low-dose 

phenylephrine at 50 mics grams per minute.  More attentive today.


2/4: No acute issues overnight.  Patient remains on low-dose phenylephrine 

currently at 40 mcgs/min.  Patient continues on Midrin 3 times a day.  

Hemoglobin stable.  Patient tolerating diet.


2/5: The patient continues on Phenylephrine infusion, now increased to 60 mcgs/

min.


2/6: Phenylephrine continues at 30mcg/min.  Patient up out of bed with physical 

therapy today.  IHD 3.5 L off yesterday


2/7: Phenylephrine weaned off since 3 AM.  Patient currently undergoing 

hemodialysis and is hemodynamically stable with maps in the low 70s.  The 

patient is tolerating a diet.  PT has been initiated and patient is out of bed 

ambulating 2-3 steps in the room yesterday.





Objective





Vital Signs








  Date Time  Temp Pulse Resp B/P (MAP) Pulse Ox O2 Delivery O2 Flow Rate FiO2


 


2/7/18 08:00 98.4 103 15 98/59 (72)    


 


2/7/18 04:00     98   


 


2/5/18 19:12        21














Intake and Output   


 


 2/7/18 2/7/18 2/8/18





 08:00 16:00 00:00


 


Intake Total 40 ml 410 ml 


 


Output Total 0 ml  


 


Balance 40 ml 410 ml 








Result Diagram:  


2/6/18 0450                                                                    

            2/7/18 0350





Imaging





Last Impressions








Chest X-Ray 1/27/18 0000 Signed





Impressions: 





 Service Date/Time:  Saturday, January 27, 2018 03:15 - CONCLUSION:  Patchy 





 consolidation or atelectasis of the left lower lung.     Chucky Morton MD 








Last Impressions








Chest X-Ray 1/27/18 0000 Signed





Impressions: 





 Service Date/Time:  Saturday, January 27, 2018 03:15 - CONCLUSION:  Patchy 





 consolidation or atelectasis of the left lower lung.     Chucky Morton MD 








Objective Remarks


GENERAL: 67 year  male sitting up in bed undergoing dialysis, in no 

apparent distress


SKIN: Woman dry.  No rash


HEAD: Atraumatic. Normocephalic. 


EYES:  No scleral icterus. No injection or drainage. 


ENT: No nasal bleeding or discharge.  Mucous membranes pink and dry..


NECK: Trachea midline.  Supple.


CARDIOVASCULAR: Heart rate in the 80s.  Irregularly irregular consistent with 

A. fib.


RESPIRATORY: No accessory muscle use. Clear to auscultation. Breath sounds 

equal bilaterally.  Decreased respiratory effort.


GASTROINTESTINAL: Abdomen soft, nondistended.  PD catheter has been removed.  

Site is clean dry and intact.  No signs erythema drainage.


MUSCULOSKELETAL: Patient has a well-healed rt heel ulcer.  Venous stasis 

changes noted to his bilateral lower extremities.


NEUROLOGICAL: Awake and weak and mildly confused. No obvious cranial nerve 

deficits.  Moves bilateral upper extremities.


BACK: Stage I/early decubitus in his sacral area.


Date of Insertion:  Jan 25, 2018


Line:  Central Venous Catheter


Side:  Right


Location:  Internal, Jugular





A/P


Assessment and Plan


Neuro/Psych: 





Depression


Chronic Pain syndrome


History of CVA 1986/2007





Holding paroxetine 10 mg daily/home medication.  Resume when clinically 

indicated


Avoid sedatives and narcotics including tramadol/home medication, monitor neuro 

status. Awake and alert


Holding clopidogrel 75 mg daily.  Resume when okay with GI





CV: 





Atrial fibrillation rate controlled


History of essential hypertension





Phenylephrine at 30  g per minute- discontinued 2/7


Monitor HR and BP keep MAP >65mmHg


Lactic acid 2.2 on 1/27


Holding clopidogrel 75 mg daily light of duodenal ulcer


Holding lisinopril 20 mill grams daily, Imdur and metoprolol 50 mill grams 

daily in light of hypotension


random Cortisol level.  20


Continue Midodrine 10 mg 3 times a day








Pulm: 





Continue with oxygen keep sat >92%.  Currently on room air


Albuterol aerosols every 2 hours when necessary dyspnea





GI/liver: 





Upper GI bleed secondary to duodenal ulcer





Currently on soft renal diet


On pantoprazole 40 mg by mouth daily


s/p EGD 1/26 which showed 2 large duodenal ulcers, grade D esophagitis, 

gastritis.





Renal/: 





Status post PD catheter removal day #4 Dr. Juarez


End-stage renal disease on hemodialysis





Monitor renal function, I/O's, avoid nephrotoxins.


Pathology Dr. Hoang.  General Surgery has followed- Dr. Juarez fungal 

infection requiring PD catheter removal on 1/30. 





Heme:





Macrocytic anemia





Monitor CBC, s/p transfusion 2units PRBC on 1/26


Hgb stable, continue to monitor





Endocrine: 





Diabetes mellitus


Elevated TSH - repeat in 3-6 weeks.  Free T4 normal.  Free T3 low.  Would not 

initiate any levothyroxine at this time in this acute clinical setting





SSI for glycemic control with low Novulog before meals at bedtime


Holding Tradjenta 5 mg daily





ID: 





Infected peritoneal dialysis catheter/peritonitis fungal





Continue fluconazole 200 mg po daily docusate 2/13


Monitor for signs of infections ( Fever, WBC) follow up on cultures.  

Peritoneal fluid 1/26 positive for yeast/Candida


ID is following.





MSK:  





Right heel ulcer


Sacral decubitus ulcer





2/4 Wound care consulted evaluate and treat


2/4 Transfer pt on Radha bed





Prophylaxis: Pantoprazole/heparin subcutaneous





Lines: Right IJ CVP placed 1/25





Level II follow-up


Hemodynamically stable off vasopressors.  [facility]  Plan transfer to medical surgical floor when bed available


Physician


Miriam Barahona MD Feb 7, 2018 10:46

## 2018-02-08 VITALS
SYSTOLIC BLOOD PRESSURE: 121 MMHG | RESPIRATION RATE: 17 BRPM | OXYGEN SATURATION: 93 % | HEART RATE: 131 BPM | DIASTOLIC BLOOD PRESSURE: 59 MMHG

## 2018-02-08 VITALS
DIASTOLIC BLOOD PRESSURE: 63 MMHG | SYSTOLIC BLOOD PRESSURE: 104 MMHG | RESPIRATION RATE: 18 BRPM | OXYGEN SATURATION: 78 % | HEART RATE: 101 BPM

## 2018-02-08 VITALS
DIASTOLIC BLOOD PRESSURE: 59 MMHG | OXYGEN SATURATION: 99 % | HEART RATE: 101 BPM | SYSTOLIC BLOOD PRESSURE: 97 MMHG | RESPIRATION RATE: 11 BRPM

## 2018-02-08 VITALS — HEART RATE: 124 BPM | RESPIRATION RATE: 19 BRPM | DIASTOLIC BLOOD PRESSURE: 85 MMHG | SYSTOLIC BLOOD PRESSURE: 111 MMHG

## 2018-02-08 VITALS
RESPIRATION RATE: 20 BRPM | HEART RATE: 129 BPM | SYSTOLIC BLOOD PRESSURE: 107 MMHG | DIASTOLIC BLOOD PRESSURE: 66 MMHG | OXYGEN SATURATION: 98 %

## 2018-02-08 VITALS
RESPIRATION RATE: 22 BRPM | OXYGEN SATURATION: 80 % | HEART RATE: 140 BPM | DIASTOLIC BLOOD PRESSURE: 75 MMHG | SYSTOLIC BLOOD PRESSURE: 116 MMHG

## 2018-02-08 VITALS
DIASTOLIC BLOOD PRESSURE: 56 MMHG | OXYGEN SATURATION: 94 % | HEART RATE: 82 BPM | SYSTOLIC BLOOD PRESSURE: 97 MMHG | RESPIRATION RATE: 22 BRPM | TEMPERATURE: 97.8 F

## 2018-02-08 VITALS — HEART RATE: 111 BPM | RESPIRATION RATE: 22 BRPM

## 2018-02-08 VITALS — HEART RATE: 115 BPM | OXYGEN SATURATION: 99 % | RESPIRATION RATE: 16 BRPM

## 2018-02-08 VITALS — HEART RATE: 104 BPM | OXYGEN SATURATION: 92 % | RESPIRATION RATE: 16 BRPM

## 2018-02-08 VITALS — RESPIRATION RATE: 15 BRPM | OXYGEN SATURATION: 97 % | HEART RATE: 107 BPM

## 2018-02-08 VITALS
HEART RATE: 101 BPM | OXYGEN SATURATION: 97 % | RESPIRATION RATE: 13 BRPM | DIASTOLIC BLOOD PRESSURE: 60 MMHG | SYSTOLIC BLOOD PRESSURE: 111 MMHG

## 2018-02-08 VITALS — RESPIRATION RATE: 11 BRPM | OXYGEN SATURATION: 100 % | HEART RATE: 98 BPM

## 2018-02-08 VITALS
SYSTOLIC BLOOD PRESSURE: 103 MMHG | DIASTOLIC BLOOD PRESSURE: 57 MMHG | HEART RATE: 110 BPM | TEMPERATURE: 97.6 F | RESPIRATION RATE: 14 BRPM

## 2018-02-08 VITALS — OXYGEN SATURATION: 99 % | RESPIRATION RATE: 14 BRPM | HEART RATE: 110 BPM

## 2018-02-08 VITALS
RESPIRATION RATE: 18 BRPM | OXYGEN SATURATION: 95 % | HEART RATE: 100 BPM | DIASTOLIC BLOOD PRESSURE: 61 MMHG | TEMPERATURE: 97.9 F | SYSTOLIC BLOOD PRESSURE: 124 MMHG

## 2018-02-08 VITALS — RESPIRATION RATE: 11 BRPM | HEART RATE: 99 BPM

## 2018-02-08 VITALS
RESPIRATION RATE: 12 BRPM | SYSTOLIC BLOOD PRESSURE: 92 MMHG | DIASTOLIC BLOOD PRESSURE: 60 MMHG | HEART RATE: 99 BPM | OXYGEN SATURATION: 91 %

## 2018-02-08 VITALS — OXYGEN SATURATION: 99 % | RESPIRATION RATE: 11 BRPM | HEART RATE: 96 BPM

## 2018-02-08 VITALS
DIASTOLIC BLOOD PRESSURE: 56 MMHG | TEMPERATURE: 98 F | RESPIRATION RATE: 12 BRPM | SYSTOLIC BLOOD PRESSURE: 106 MMHG | HEART RATE: 96 BPM

## 2018-02-08 VITALS — HEART RATE: 95 BPM

## 2018-02-08 VITALS — RESPIRATION RATE: 12 BRPM | HEART RATE: 100 BPM | OXYGEN SATURATION: 94 %

## 2018-02-08 VITALS — OXYGEN SATURATION: 99 % | HEART RATE: 101 BPM | RESPIRATION RATE: 23 BRPM

## 2018-02-08 VITALS
SYSTOLIC BLOOD PRESSURE: 95 MMHG | HEART RATE: 94 BPM | DIASTOLIC BLOOD PRESSURE: 60 MMHG | RESPIRATION RATE: 12 BRPM | OXYGEN SATURATION: 93 % | TEMPERATURE: 97.7 F

## 2018-02-08 VITALS
SYSTOLIC BLOOD PRESSURE: 108 MMHG | DIASTOLIC BLOOD PRESSURE: 64 MMHG | HEART RATE: 121 BPM | RESPIRATION RATE: 19 BRPM | OXYGEN SATURATION: 96 %

## 2018-02-08 VITALS — HEART RATE: 97 BPM

## 2018-02-08 VITALS — RESPIRATION RATE: 17 BRPM | HEART RATE: 108 BPM

## 2018-02-08 VITALS — OXYGEN SATURATION: 98 % | HEART RATE: 95 BPM | RESPIRATION RATE: 10 BRPM

## 2018-02-08 VITALS — RESPIRATION RATE: 21 BRPM | HEART RATE: 120 BPM

## 2018-02-08 VITALS — OXYGEN SATURATION: 99 % | RESPIRATION RATE: 14 BRPM | HEART RATE: 100 BPM

## 2018-02-08 VITALS — OXYGEN SATURATION: 100 %

## 2018-02-08 VITALS — HEART RATE: 92 BPM

## 2018-02-08 VITALS — HEART RATE: 87 BPM

## 2018-02-08 VITALS — HEART RATE: 126 BPM | RESPIRATION RATE: 18 BRPM

## 2018-02-08 VITALS — HEART RATE: 120 BPM

## 2018-02-08 LAB
BUN SERPL-MCNC: 33 MG/DL (ref 7–18)
CALCIUM SERPL-MCNC: 7.5 MG/DL (ref 8.5–10.1)
CHLORIDE SERPL-SCNC: 100 MEQ/L (ref 98–107)
CREAT SERPL-MCNC: 3.36 MG/DL (ref 0.6–1.3)
ERYTHROCYTE [DISTWIDTH] IN BLOOD BY AUTOMATED COUNT: 21.4 % (ref 11.6–17.2)
GFR SERPLBLD BASED ON 1.73 SQ M-ARVRAT: 18 ML/MIN (ref 89–?)
GLUCOSE SERPL-MCNC: 119 MG/DL (ref 74–106)
HCO3 BLD-SCNC: 31 MEQ/L (ref 21–32)
HCT VFR BLD CALC: 30.4 % (ref 39–51)
HGB BLD-MCNC: 10.3 GM/DL (ref 13–17)
MCH RBC QN AUTO: 32.2 PG (ref 27–34)
MCHC RBC AUTO-ENTMCNC: 33.9 % (ref 32–36)
MCV RBC AUTO: 95.2 FL (ref 80–100)
PLATELET # BLD: 163 TH/MM3 (ref 150–450)
PMV BLD AUTO: 7.6 FL (ref 7–11)
RBC # BLD AUTO: 3.2 MIL/MM3 (ref 4.5–5.9)
SODIUM SERPL-SCNC: 140 MEQ/L (ref 136–145)
WBC # BLD AUTO: 7.3 TH/MM3 (ref 4–11)

## 2018-02-08 RX ADMIN — CALCITRIOL SCH MCG: 0.25 CAPSULE, GELATIN COATED ORAL at 09:08

## 2018-02-08 RX ADMIN — CALCIUM CARBONATE (ANTACID) CHEW TAB 500 MG SCH MG: 500 CHEW TAB at 19:41

## 2018-02-08 RX ADMIN — HEPARIN SODIUM SCH UNITS: 10000 INJECTION, SOLUTION INTRAVENOUS; SUBCUTANEOUS at 19:42

## 2018-02-08 RX ADMIN — HUMAN INSULIN SCH: 100 INJECTION, SOLUTION SUBCUTANEOUS at 19:59

## 2018-02-08 RX ADMIN — Medication SCH ML: at 12:55

## 2018-02-08 RX ADMIN — HUMAN INSULIN SCH: 100 INJECTION, SOLUTION SUBCUTANEOUS at 17:00

## 2018-02-08 RX ADMIN — HUMAN INSULIN SCH: 100 INJECTION, SOLUTION SUBCUTANEOUS at 08:00

## 2018-02-08 RX ADMIN — STANDARDIZED SENNA CONCENTRATE AND DOCUSATE SODIUM SCH TAB: 8.6; 5 TABLET, FILM COATED ORAL at 09:08

## 2018-02-08 RX ADMIN — CALCIUM CARBONATE (ANTACID) CHEW TAB 500 MG SCH MG: 500 CHEW TAB at 09:08

## 2018-02-08 RX ADMIN — FLUCONAZOLE SCH MG: 200 TABLET ORAL at 09:08

## 2018-02-08 RX ADMIN — HUMAN INSULIN SCH: 100 INJECTION, SOLUTION SUBCUTANEOUS at 12:00

## 2018-02-08 RX ADMIN — Medication SCH ML: at 19:42

## 2018-02-08 RX ADMIN — MIDODRINE HYDROCHLORIDE SCH MG: 5 TABLET ORAL at 18:01

## 2018-02-08 RX ADMIN — PANTOPRAZOLE SCH MG: 40 TABLET, DELAYED RELEASE ORAL at 09:08

## 2018-02-08 RX ADMIN — MIDODRINE HYDROCHLORIDE SCH MG: 5 TABLET ORAL at 12:55

## 2018-02-08 RX ADMIN — HEPARIN SODIUM SCH UNITS: 10000 INJECTION, SOLUTION INTRAVENOUS; SUBCUTANEOUS at 09:08

## 2018-02-08 RX ADMIN — CHLORHEXIDINE GLUCONATE SCH PACK: 500 CLOTH TOPICAL at 04:00

## 2018-02-08 RX ADMIN — MIDODRINE HYDROCHLORIDE SCH MG: 5 TABLET ORAL at 07:00

## 2018-02-08 RX ADMIN — STANDARDIZED SENNA CONCENTRATE AND DOCUSATE SODIUM SCH TAB: 8.6; 5 TABLET, FILM COATED ORAL at 19:42

## 2018-02-08 NOTE — HHI.HCPN
Reason for visit


   a.  To assist with evaluation and management of symptoms including: weakness

, chronic pain 


   b.  To assist medical decision maker(s) with: better understanding of 

current medical conditions; weighing benefits/burdens of medical treatment 

options; making        


        medical treatment decisions.





Subjective/Interval History


Pt seen today to follow up on comfort, goals. 





Patient has been weaned off pressors, awaiting transfer out of ICU to medical 

unit. Ongoing HD, transfused 2 U RBC during hemodialysis yesterday. CM working 

on discharge placement w wife, possible accepting facilities.   Notified by [facility] 

nursing requesting follow up regarding goals w pt, psychiatry evaluated today 

to render opinion regarding capacity.  


 


Patient seen in room no visitors present.  He is initially sleeping though 

arouses to stimuli.  He does not remember my name but recognizes me.  He is 

mostly oriented but confused about certain aspects.  He tells me that he was 

discharged 2 days ago and he is going to get in trouble for being here, and 

that his wife doesn't know his here.  However interestingly when I ask him 

where he's at he knows he is at [facility] in [facility] and he knows that 

he has been here for a while, he knows he is been getting hemodialysis, and he 

knows that he was supposed to be getting discharged to rehabilitation.  He 

knows he has been in and out of the hospital since December.  However he again 

tells me that he was supposedly discharged 2 days ago and he should be in 

rehabilitation now.  When I further review hospital course he concurs and he 

tells me that that is all correct except that he has been discharged.  He tells 

me he has been walking, that he "walked in here today "and his walking has been 

doing fine.  He asked that I updated his wife because she does not know that he 

is here.  Asked him who he would trust to make his decisions if he were 

incapacitated he tells me his wife. This is consistent with prior conversations 

that I have had with this patient. He denies pain.  He denies dyspnea.


Explore with him resuscitation status he tells me that he would not want CPR 

though he might want a tube if he needed it for feeding.  Her explore with him 

what resuscitation entails I reviewed cardiac resuscitation which involves 

chest compressions and possible shock, and intubation which would then require 

subsequent ventilation with mechanical ventilator.  He tells me that he would 

not want a breathing tube nor would he want anything done to his chest that the 

only to be would want would be possibly if he needed a tube to help him eat.  

He again further clarifies that he would not want a machine to breathe for him.

  Further explore what he would except he acknowledges he would accept oxygen 

masks and possibly high flow oxygen masks but that he would not want a tube to 

breathe for him.  This is consistent with prior conversations that I have had 

with this patient.








Discussed with med attending Dr Singh, primary nurse off unit, updated covering 

nurse.  Also discussed with psychiatry Dr. Ontiveros.





.


Family/friend interactions


Following exam call to patient wife.  She was in yesterday to see patient.  She 

is expecting him to transfer out of the ICU when of her room is available.  She 

feels like overall he is doing little better.  She notes that his mental status 

does fluctuate and he has some days where he appears quite confused her and 

other days  that he is clear.  Review with her hospital course and that patient 

remains debilitated and will likely continue to experience fluctuations in 

health and complications given his prolonged hospitalizations starting in 

December.  Questions answered regarding blood pressure, overall medical 

conditions, dialysis, discharge planning.  Explore with her that I again 

reviewed with patient CPR status and that he expressed he did not want chest 

compressions shock or breathing tubes, she affirms that this is consistent with 

wishes he has expressed her.  She is supportive of this.  All questions 

answered.  She is appreciative of ongoing updates and support.





.





Advance Directives


Living Will:  Never completed


Health Care Surrogate:  Copy in medical record


Durable Power of :  Copy in medical record


Advance Directive Specifics


Date completed:


1/21/18


Health Care Surrogate(s):


Healthcare surrogate document [1/21/18] actually names the patient as 

healthcare surrogate. durable power of  does not include healthcare 

decision making.  Palliative care assisted patient to complete new healthcare 

surrogate designation when he was alert and oriented he named his wife as 

healthcare surrogate.





Objective





Vital Signs








  Date Time  Temp Pulse Resp B/P (MAP) Pulse Ox O2 Delivery O2 Flow Rate FiO2


 


2/8/18 06:00  97      


 


2/8/18 04:00  96      


 


2/8/18 04:00 98.0 96 12 106/56 (73)    


 


2/8/18 02:00  92      


 


2/8/18 00:00 97.7 94 12 95/60 (72) 93   


 


2/8/18 00:00  94      


 


2/7/18 22:00  101      


 


2/7/18 21:11     100   21


 


2/7/18 20:00 97.5 111 26 110/73 (85) 97   


 


2/7/18 20:00  99      


 


2/7/18 18:00  100      


 


2/7/18 17:45     97   


 


2/7/18 17:00  88      


 


2/7/18 16:00  86      


 


2/7/18 16:00 97.9 86 19 100/60 (73) 97   


 


2/7/18 15:00  118      


 


2/7/18 15:00  118 15 103/67 (79)    


 


2/7/18 14:00  106      


 


2/7/18 14:00  106 23 114/69 (84) 99   


 


2/7/18 13:00  99 26  91   


 


2/7/18 13:00  99      


 


2/7/18 12:00  95      


 


2/7/18 12:00 98.3 95 13 102/62 (75) 100   














Intake & Output  


 


 2/8/18 2/8/18





 07:00 19:00


 


Output Total 0 ml 


 


Balance 0 ml 


 


  


 


Output Urine Total 0 ml 








Physical Exam


CONSTITUTIONAL/GENERAL: Frail. Slight temporal wasting. In no apparent 

distress. 


TUBES/LINES/DRAINS: Central line rt IJ.  Peripheral IV upper extremity.


SKIN: Pale.  Scattered petechia/ecchymosis on upper extremities. Chronic venous 

insufficiency of lower extremities.


NECK: Trachea midline. Supple, nontender. No palpable thyroid enlargement or 

nodularity. 


CARDIOVASCULAR: Irregular rate and irregular rhythm, no murmur.  No JVD. 

Peripheral pulses symmetric.


RESPIRATORY/CHEST: Mild shortness of breath with conversation-though denies 

shortness of breath.  Lungs are clear.  Breath sounds equal bilaterally.    


GASTROINTESTINAL: Abdomen soft, round, non-tender, nondistended. No hepato-

splenomegaly. No guarding.  


MUSCULOSKELETAL: Atrophy in bilateral lower extremities. Extremities without 

clubbing, cyanosis, or edema.+ chronic vascular skin changes BLE. No joint 

effusion, erythema or tenderness to exam bilateral knees 


NEUROLOGICAL: Awake and alert. Oriented x 2-3.  limited insight.  Forgetful at 

times. Follows commands. moves all 4 extremities. 


PSYCHIATRIC:. No obvious anxiety/depression. 





.





Diagnostic Tests


Laboratory





Laboratory Tests








Test


  2/6/18


04:50 2/7/18


03:50 2/8/18


03:45


 


White Blood Count


  5.8 TH/MM3


(4.0-11.0) 


  7.3 TH/MM3


(4.0-11.0)


 


Red Blood Count


  2.28 MIL/MM3


(4.50-5.90) 


  3.20 MIL/MM3


(4.50-5.90)


 


Hemoglobin


  7.6 GM/DL


(13.0-17.0) 


  10.3 GM/DL


(13.0-17.0)


 


Hematocrit


  23.1 %


(39.0-51.0) 


  30.4 %


(39.0-51.0)


 


Mean Corpuscular Volume


  101.3 FL


(80.0-100.0) 


  95.2 FL


(80.0-100.0)


 


Mean Corpuscular Hemoglobin


  33.5 PG


(27.0-34.0) 


  32.2 PG


(27.0-34.0)


 


Mean Corpuscular Hemoglobin


Concent 33.1 %


(32.0-36.0) 


  33.9 %


(32.0-36.0)


 


Red Cell Distribution Width


  22.9 %


(11.6-17.2) 


  21.4 %


(11.6-17.2)


 


Platelet Count


  202 TH/MM3


(150-450) 


  163 TH/MM3


(150-450)


 


Mean Platelet Volume


  7.6 FL


(7.0-11.0) 


  7.6 FL


(7.0-11.0)


 


Blood Urea Nitrogen


  26 MG/DL


(7-18) 36 MG/DL


(7-18) 33 MG/DL


(7-18)


 


Creatinine


  3.65 MG/DL


(0.60-1.30) 4.13 MG/DL


(0.60-1.30) 3.36 MG/DL


(0.60-1.30)


 


Random Glucose


  117 MG/DL


() 105 MG/DL


() 119 MG/DL


()


 


Total Protein


  5.2 GM/DL


(6.4-8.2) 5.0 GM/DL


(6.4-8.2) 


 


 


Calcium Level


  7.2 MG/DL


(8.5-10.1) 6.5 MG/DL


(8.5-10.1) 7.5 MG/DL


(8.5-10.1)


 


Phosphorus Level


  3.0 MG/DL


(2.5-4.9) 3.4 MG/DL


(2.5-4.9) 


 


 


Magnesium Level


  2.1 MG/DL


(1.5-2.5) 2.1 MG/DL


(1.5-2.5) 


 


 


Sodium Level


  139 MEQ/L


(136-145) 139 MEQ/L


(136-145) 140 MEQ/L


(136-145)


 


Potassium Level


  4.1 MEQ/L


(3.5-5.1) 4.4 MEQ/L


(3.5-5.1) 4.2 MEQ/L


(3.5-5.1)


 


Chloride Level


  99 MEQ/L


() 100 MEQ/L


() 100 MEQ/L


()


 


Carbon Dioxide Level


  31.9 MEQ/L


(21.0-32.0) 30.1 MEQ/L


(21.0-32.0) 31.0 MEQ/L


(21.0-32.0)


 


Anion Gap 8 MEQ/L (5-15)  9 MEQ/L (5-15)  9 MEQ/L (5-15) 


 


Estimat Glomerular Filtration


Rate 17 ML/MIN


(>89) 15 ML/MIN


(>89) 18 ML/MIN


(>89)


 


Protein Corrected Calcium


  8.2 MG/DL


(8.5-10.1) 7.5 MG/DL


(8.5-10.1) 


 








Result Diagram:  


2/8/18 0345                                                                    

            2/8/18 0345





Imaging





Last Impressions








Chest X-Ray 1/27/18 0000 Signed





Impressions: 





 Service Date/Time:  Saturday, January 27, 2018 03:15 - CONCLUSION:  Patchy 





 consolidation or atelectasis of the left lower lung.     Chucky Morton MD 








Procedures


1/26/18 EGD





Assessment and Plan


Disease Oriented Problem List:  


(1) Atrial fibrillation


(2) Sacral decubitus ulcer


(3) Heel ulceration


(4) Hypotension


(5) GI bleed


(6) Anemia


(7) ESRD (end stage renal disease) on dialysis


Symptom Scale:  


(1) Weakness


0-10 Scale:  Unable to quantify





Pertinent Non-Medical Issues


Psychosocial: to his wife x 6 yrs. Has 1 adult son from prior marriage, 

who is incarcerated in [place]. He communicates with him every Sunday (son 

calls him). 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 His 

mother is still living there, and a sister. Moved to [place] in his 50s for 

intermediate. 


Spiritual:no particular affiliation, does not want  support


Legal:Patient has a healthcare surrogate document in his chart however this 

document actually names the patient as designated healthcare surrogate.  Per 

[place] statutes his wife would be appropriate legal proxy if patient 

incapacitated. 


1/26/18 assisted pt to complete new HCS, names his wife as HCS.


Ethical issues impacting care:


Important Contacts


spouse Yessy Meehan [phone]


.


Prognosis


This patient was admitted for hypotension, significant anemia, GI bleed.  He 

has known history of dialysis dependent end-stage renal disease.  Ongoing 

diagnostics, GI evaluation.  With ongoing aggressive treatments and 

interventions may be able to recover from current acute conditions.





.


Code Status:  No Code


Plan





* Legal decision maker:Patient has a healthcare surrogate document in his chart 

however this document actually names the patient as designated healthcare 

surrogate.  Per [place] statutes his wife would be appropriate legal proxy if 

patient incapacitated. Pt at time of my exam is oriented, appropriate and 

appears to have fair insight to his conditions/options. Appears able to make 

his own decisions . 1/26/18 assisted pt to complete new HCS, names his wife as 

HCS.


   


* Goals: 2/8/18 Pt goals remain aggressive short of resuscitation. I pt mostly 

oriented during my interaction with him today however at times forgetful or 

confuses details.  I have reviewed with him the components of CPR and he again 

affirms DNR/DNI status with me today.  I have updated his wife on the phone, 

whom he indicates would be his medical decision maker should he be 

incapacitated.  She agrees with the DNR status as consistent with patient 

previously expressed wishes.


   


* CODE STATUS: DNR 


   


* SYMPTOMS:


   --weakness/debility- ongoing since Dec 25th. Limited to no ambulation since 

that time. Rec cont OT/PT to maintain/improve functional status


   --pain- today pt reports Rt knee pain, exam benign. pt wife reports he has 

chronic neuropathic sounding pain to BLE [place] at night, and chronic pain to 

bilateral chronic foot wounds. He has been on norco 5mg at home.  Pt 

hypotensive during hospital course, requiring pressor, cautious use of opiates. 

Consider resuming home norco or tramadol PRN if patient begins having pain. 





* Palliative care will continue to follow during hospital course as condition 

evolves, to assist patient/decision-maker with understanding of medical 

conditions, weighing benefits/burdens of treatment options, for clarification 

of goals of treatment.  Additionally will assist with any symptoms of 

palliative concern





Attestation


To help prompt me to consider important information that might be impacting 

today's encounter and assessment, information from prior notes written by 

myself or my colleagues may have been "brought forward" into today's note.  My 

signature on this note, however, is an attestation that I personally performed 

the exam, history, and/or decision-making noted today, and, unless otherwise 

indicated, the interactions with patient, family, and staff as well as the 

review of records all occurred today.  I also attest that the listed assessment 

and stated plan reflect my best clinical judgment today based on the 

combination of historical information, prior notes, and today's exam/ 

interactions.  When time spent is documented, it refers only to time spent 

today by the signer, or if indicated, combined time spent today by 

collaborating physician/nurse practitioner.











Leandra Sparks Feb 8, 2018 12:52

## 2018-02-08 NOTE — HHI.NPPN
Subjective


General Problems:  Anemia


Renal Failure:  End Stage Renal Disease


History of Present Illness


Patient is a very pleasant 67 year old male who came into the emergency room on 

01/26/18 hypotensive, lethargic, and with a hemoglobin of 6.7.  He was 

transfused 2 units of PRBC's with a recheck pending.  He was initially put on 

Levophed for blood pressure support however it is currently not running. Has a 

past medical history of End -stage renal disease with peritoneal dialysis, AFIB

, diabetes, arthritis, chronic pain, and hx of strokes.    Onset of acute 

epigastric pain and discomfort noted for 5 days ago, but worsened over the past 

24 hours.  Reported loose stools for 3 weeks.  In the emergency room patient 

did report melena, nausea, mild vomiting undigested food yesterday, but denied 

any coffee ground emesis. Patient has AV fistula and will have dialysis while 

here.  Per patient PD catheter needs to be removed secondary to infection.


Additional Remarks


Patient is alert however confused this morning,  Denies any SOB, no edema


 (Gellermann,Diane M. ARNP)





Review of Systems


General


Constitutional:  Fatigue


General Remarks


weakness


 (Gellermann,Diane M. ARNP)





Ears, Nose, & Throat


ENT Remarks


Dry mouth


 (Gellermann,Diane M. ARNP)





Respiratory


Respiratory Remarks


Denies SOB


 (Gellermann,Diane M. ARNP)





Cardiovascular


Cardiac Remarks


Denies CP


 (Gellermann,Diane M. ARNP)





Gastrointestinal


GI Remarks


Denies abdominal pain


 (Gellermann,Diane M. ARNP)





Objective Data


Data





Vital Signs








  Date Time  Temp Pulse Resp B/P (MAP) Pulse Ox O2 Delivery O2 Flow Rate FiO2


 


2/8/18 06:00  97      


 


2/8/18 04:00  96      


 


2/8/18 04:00 98.0 96 12 106/56 (73)    


 


2/8/18 02:00  92      


 


2/8/18 00:00 97.7 94 12 95/60 (72) 93   


 


2/8/18 00:00  94      


 


2/7/18 22:00  101      


 


2/7/18 21:11     100   21


 


2/7/18 20:00 97.5 111 26 110/73 (85) 97   


 


2/7/18 20:00  99      


 


2/7/18 18:00  100      


 


2/7/18 17:45     97   


 


2/7/18 17:00  88      


 


2/7/18 16:00  86      


 


2/7/18 16:00 97.9 86 19 100/60 (73) 97   


 


2/7/18 15:00  118      


 


2/7/18 15:00  118 15 103/67 (79)    


 


2/7/18 14:00  106      


 


2/7/18 14:00  106 23 114/69 (84) 99   


 


2/7/18 13:00  99 26  91   


 


2/7/18 13:00  99      


 


2/7/18 12:00  95      


 


2/7/18 12:00 98.3 95 13 102/62 (75) 100   


 


2/7/18 11:00  99 18 88/59 (69)    


 


2/7/18 11:00  99      








 (Gellermann,Diane M. ARNP)


-:  


2/8/18 0345                                                                    

            2/8/18 0345








Physical Exam


General


Appearance:  Malnourished


 (Gellermann,Diane M. ARNP)





Neck


Neck Exam:  Neck Supple


 (Gellermann,Diane M. ARNP)





Pulmonary


Resp Exam:  Clear Bilaterally, No Distress


 (Gellermann,Diane M. ARNP)





Cardiology


CV Exam:  Regular


 (Gellermann,Diane M. ARNP)





Gastrointestinal/Abdomen


GI Exam:  Soft, Non-Tender


 (Gellermann,Diane M. ARNP)





Genitourinary


 Exam:  Flank Non-Tender


 (Gellermann,Diane M. ARNP)





Extremeties


Extremities Exam:  No Edema


 (Gellermann,Diane M. ARNP)





Assessment/Plan


Problem List:  


(1) ESRD (end stage renal disease) on dialysis


ICD Codes:  N18.6 - End stage renal disease; Z99.2 - Dependence on renal 

dialysis


Plan:  Dialysis MWF


Calcuim 7.5 today on oral replacement. 


HGB 10.5 today after 2 units of PRBC's yesterday  


Continue on midodrine 10 mg TID 


Transfer to regular floor


Referral to  to assist with outpatient dialysis


Plan for dialysis tomorrow





(2) Peritonitis associated with peritoneal dialysis


ICD Codes:  T85.71XA - Infection and inflammatory reaction due to peritoneal 

dialysis catheter, initial encounter


Plan:  Peritoneal fluid culture positive for Candida. 


On fluconazole with stop date of the 13th


PD catheter removed


ID and surgery following. 





(3) Hypotension


ICD Codes:  I95.9 - Hypotension, unspecified


Status:  Acute


Plan:  Sepsis present on admission. 


Continue supportive care. 





(4) GI bleed


ICD Codes:  K92.2 - Gastrointestinal hemorrhage, unspecified


Status:  Acute


Plan:  s/p EGD: duodenal ulcer, gastritis, esophagitis. 


Received 2 units PRBC on admission.








(5) Anemia


ICD Codes:  D64.9 - Anemia, unspecified


Plan:  HGB  10.5 Epogen 10,000 units with each dialysis


Transfused 2 units of PRBC yesterday


 (Gellermann,Diane M. ARNP)


Problem List:  


(1) ESRD (end stage renal disease) on dialysis


ICD Codes:  N18.6 - End stage renal disease; Z99.2 - Dependence on renal 

dialysis


Plan:  Dialysis MWF


Calcuim 7.5 today on oral replacement. 


HGB 10.5 today after 2 units of PRBC's yesterday  


Continue on midodrine 10 mg TID 


Transfer to regular floor


Referral to  to assist with outpatient dialysis


Plan for dialysis tomorrow.


Patient seen and examined, agree with above.


Psych. consult noted.





(2) Peritonitis associated with peritoneal dialysis


ICD Codes:  T85.71XA - Infection and inflammatory reaction due to peritoneal 

dialysis catheter, initial encounter


Plan:  Peritoneal fluid culture positive for Candida. 


On fluconazole with stop date of the 13th


PD catheter removed


ID and surgery following. 





(3) Hypotension


ICD Codes:  I95.9 - Hypotension, unspecified


Status:  Acute


Plan:  Sepsis present on admission. 


Continue supportive care. 





(4) GI bleed


ICD Codes:  K92.2 - Gastrointestinal hemorrhage, unspecified


Status:  Acute


Plan:  s/p EGD: duodenal ulcer, gastritis, esophagitis. 


Received 2 units PRBC on admission.








(5) Anemia


ICD Codes:  D64.9 - Anemia, unspecified


Plan:  HGB  10.5 Epogen 10,000 units with each dialysis


Transfused 2 units of PRBC yesterday


 (Carlita Hoang MD)





Problem Qualifiers





(1) Hypotension:  


Qualified Codes:  I95.9 - Hypotension, unspecified


(2) GI bleed:  


Qualified Codes:  K92.2 - Gastrointestinal hemorrhage, unspecified


(3) Anemia:  








Gellermann,Diane M. ARNP Feb 8, 2018 10:14


Carlita Hoang MD Feb 8, 2018 15:28

## 2018-02-08 NOTE — HHI.PR
Subjective


Remarks


Pt denies any pain, nausea or vomiting. He denies any SOB/CP. Pt states he 

wants to get up and leave. Pt seems a bit confused and states that he was 

discharged last night and he initially tells me that he already left the 

hospital and is waiting. He later upon further questioning is able to answer my 

questions such as being in [place], knows the year and the month. 


Discussed w RN, she tells me that she is very concerned regarding the code 

status. Apparently pt told her that he wants aggressive care. Per palliative 

care notes there is some confusion on code status. Will discuss w them as well. 

RN feels that a psych consult eval would be appropriate to make sure he is able 

to make decisions.





Objective


Vitals





Vital Signs








  Date Time  Temp Pulse Resp B/P (MAP) Pulse Ox O2 Delivery O2 Flow Rate FiO2


 


2/8/18 06:00  97      


 


2/8/18 04:00  96      


 


2/8/18 04:00 98.0 96 12 106/56 (73)    


 


2/8/18 02:00  92      


 


2/8/18 00:00 97.7 94 12 95/60 (72) 93   


 


2/8/18 00:00  94      


 


2/7/18 22:00  101      


 


2/7/18 21:11     100   21


 


2/7/18 20:00 97.5 111 26 110/73 (85) 97   


 


2/7/18 20:00  99      


 


2/7/18 18:00  100      


 


2/7/18 17:45     97   


 


2/7/18 17:00  88      


 


2/7/18 16:00  86      


 


2/7/18 16:00 97.9 86 19 100/60 (73) 97   


 


2/7/18 15:00  118      


 


2/7/18 15:00  118 15 103/67 (79)    


 


2/7/18 14:00  106      


 


2/7/18 14:00  106 23 114/69 (84) 99   


 


2/7/18 13:00  99 26  91   


 


2/7/18 13:00  99      


 


2/7/18 12:00  95      


 


2/7/18 12:00 98.3 95 13 102/62 (75) 100   


 


2/7/18 11:00  99 18 88/59 (69)    


 


2/7/18 11:00  99      


 


2/7/18 10:00  120      


 


2/7/18 10:00  120 14 94/58 (70)    


 


2/7/18 09:00  105 30 100/63 (75)    


 


2/7/18 09:00  105      














I/O      


 


 2/7/18 2/7/18 2/7/18 2/8/18 2/8/18 2/8/18





 07:00 15:00 23:00 07:00 15:00 23:00


 


Intake Total 40 ml 410 ml 400 ml   


 


Output Total 0 ml 1000 ml 0 ml 0 ml  


 


Balance 40 ml -590 ml 400 ml 0 ml  


 


      


 


Intake Oral 40 ml  300 ml   


 


IV Total   100 ml   


 


Packed Cells  400 ml    


 


Blood Product IV Normal Saline Flush  10 ml    


 


Output Urine Total 0 ml  0 ml 0 ml  


 


Hemodialysis  1000 ml    


 


# Bowel Movements 1  1   








Result Diagram:  


2/8/18 0345                                                                    

            2/8/18 0345





Imaging





Last Impressions








Chest X-Ray 1/27/18 0000 Signed





Impressions: 





 Service Date/Time:  Saturday, January 27, 2018 03:15 - CONCLUSION:  Patchy 





 consolidation or atelectasis of the left lower lung.     Chucky Morton MD 








Objective Remarks


GENERAL: 67 year  male sitting up in bed, appears comfortable


EYES:  EOMI


ENT: No nasal bleeding or discharge.  Mucous membranes pink and dry..


NECK: Trachea midline.  Supple.


CARDIOVASCULAR: Heart rate in the 90's  Irregularly irregular consistent with 

A. fib.


RESPIRATORY:  Clear to auscultation. Breath sounds equal bilaterally.  

Decreased respiratory effort.


GASTROINTESTINAL: Abdomen soft, nondistended. incision healing well, no signs 

of infection/no drainage.  


MUSCULOSKELETAL: Patient has a well-healed rt heel ulcer.  Venous stasis 

changes noted to his bilateral lower extremities.


NEUROLOGICAL: Awake and weak and mildly confused. No obvious cranial nerve 

deficits.  Moves bilateral upper extremities.


BACK: Stage I/early decubitus in his sacral area no evaluated this morning.


Date of Insertion:  Jan 25, 2018


Line:  Central Venous Catheter


Side:  Right


Location:  Internal, Jugular





A/P


Assessment and Plan


Neuro/Psych: 


Depression


Chronic Pain syndrome


History of CVA 1986/2007


Holding paroxetine 10 mg daily/home medication.  Resume when clinically 

indicated


Avoid sedatives and narcotics including tramadol/home medication, monitor neuro 

status. Awake and alert


Holding clopidogrel 75 mg daily.  Resume when okay with GI. Per records, pt 

received 1 unit PRBC 2/7/18





CV: 


Atrial fibrillation rate controlled


History of essential hypertension


s/p Phenylephrine- discontinued 2/7


Monitor HR and BP keep MAP >65mmHg


Lactic acid 2.2 on 1/27


Holding clopidogrel 75 mg daily light of duodenal ulcer


Holding lisinopril 20 mill grams daily, Imdur and metoprolol 50 mill grams 

daily in light of hypotension


random Cortisol level.  20


Continue Midodrine 10 mg 3 times a day








Pulm: 


Continue with oxygen keep sat >92%.  Currently on room air


Albuterol aerosols every 2 hours when necessary dyspnea





GI/liver: 


Upper GI bleed secondary to duodenal ulcer


Currently on soft renal diet


On pantoprazole 40 mg by mouth daily


s/p EGD 1/26 which showed 2 large duodenal ulcers, grade D esophagitis, 

gastritis.





Renal/: 


Status post PD catheter removal day #5 Dr. Juarez


End-stage renal disease on hemodialysis


Monitor renal function, I/O's, avoid nephrotoxins.


nephrologist Dr. Hoang.  General Surgery has followed- Dr. Juarez fungal 

infection requiring PD catheter removal on 1/30. 





Heme:


Macrocytic anemia


Monitor CBC, s/p transfusion 2units PRBC on 1/26


Hgb stable, continue to monitor





Endocrine: 


Diabetes mellitus


Elevated TSH - repeat in 3-6 weeks.  Free T4 normal.  Free T3 low.  Would not 

initiate any levothyroxine at this time in this acute clinical setting


SSI for glycemic control with low Novulog before meals at bedtime


Holding Tradjenta 5 mg daily





ID: 


Infected peritoneal dialysis catheter/peritonitis fungal


Continue fluconazole 200 mg po daily w end date on 2/13


Monitor for signs of infections ( Fever, WBC) follow up on cultures.  

Peritoneal fluid 1/26 positive for yeast/Candida


ID is following.





MSK:  


Right heel ulcer


Sacral decubitus ulcer


2/4 Wound care consulted evaluate and treat


2/4 Transfer pt on Radha bed





Prophylaxis: Pantoprazole/heparin subcutaneous, monitor H&H closely. If 

continues to drop, need to hold heparin.


Discharge Planning


CM assisting w d/c planning


I did place a psych consult for assistance as RN is concerned about pt's 

ability to make own decisions. He does seems confused at times when I speak w 

him today. 


Continue to monitor H&H. 


Transfer to regular floor











Alondra Singh MD Feb 8, 2018 08:33

## 2018-02-08 NOTE — PD.PSY.CON
Provisional Diagnosis


Admission Date


Jan 26, 2018 at 02:11


[place] I.


psychological factors affecting a medical


Axis II.


Deferred





History of Present Illness


Service


Psychiatry


Consult Requested By


Critical care team


Reason for Consult


Decision-making capacity


Primary Care Physician


Carlita Hoang MD


HPI


The patient is a 69 year-old  man, domiciled with his wife in [facility], 

retired, without any previous psychiatric history, no previous suicidal attempts

, no previous psychiatric hospitalizations, the patient denies the use of 

alcohol and illegal drugs, he has an extensive medical history of hypertension, 

A. fib, GI bleeding, ESRD, admitted in the ICU due to sepsis/infected 

peritoneal catheter.  Consulted to psychiatry for assessment of decision-making 

capacity.  On psychiatric evaluation today the patient is calm, cooperative and 

pleasant.  She is fully oriented 3, at the beginning a little bit oppositional 

and irritable with a psychiatric evaluation, asking "why do I have to see a 

psychiatrist", but he calmed down after redirection.  The patient is able to 

state that he does not want to be resuscitated, but he does want aggressive 

treatment and intubation if needed.  Patient clarifies that indicates that he 

is unable to take decisions due to his mental state "my wife is to person 

making decisions for me".  Patient is able to verbalize a very good 

understanding and appreciation of his underlying medical conditions and his 

choice is to continue medical treatment and medical recommendations at the 

moment.  The patient denies suicidal and homicidal ideation, he denies visual 

and auditory hallucinations





Review of Systems


Constitutional:  DENIES: Diaphoretic episodes, Fatigue, Fever, Weight gain, 

Weight loss, Chills, Dizziness, Change in appetite, Night Sweats


Endocrine:  DENIES: Heat/cold intolerance, Polydipsia, Polyuria, Polyphagia


Eyes:  DENIES: Blurred vision, Diplopia, Eye inflammation, Eye pain, Vision loss

, Photosensitivity, Double Vision


Ears, nose, mouth, throat:  DENIES: Tinnitus, Hearing loss, Vertigo, Nasal 

discharge, Oral lesions, Throat pain, Hoarseness, Ear Pain, Running Nose, 

Epistaxis, Sinus Pain, Toothache, Odynophagia


Respiratory:  DENIES: Apneas, Cough, Snoring, Wheezing, Hemoptysis, Sputum 

production, Shortness of breath


Cardiovascular:  DENIES: Chest pain, Palpitations, Syncope, Dyspnea on Exertion

, PND, Lower Extremity Edema, Orthopnea, Claudication


Gastrointestinal:  DENIES: Abdominal pain, Black stools, Bloody stools, 

Constipation, Diarrhea, Nausea, Vomiting, Difficulty Swallowing, Anorexia


Genitourinary:  DENIES: Sexual dysfunction, Urinary frequency, Urinary 

incontinence, Urgency, Hematuria, Dysuria, Nocturia, Penile Discharge, 

Testicular Pain, Testicular Swelling


Musculoskeletal:  DENIES: Joint pain, Muscle aches, Stiffness, Joint Swelling, 

Back pain, Neck pain


Integumentary:  DENIES: Abnormal pigmentation, Nail changes, Pruritus, Rash


Hematologic/lymphatic:  DENIES: Bruising, Lymphadenopathy


Immunologic/allergic:  DENIES: Eczema, Urticaria


Neurologic:  DENIES: Abnormal gait, Headache, Localized weakness, Paresthesias, 

Seizures, Speech Problems, Tremor, Poor Balance


Psychiatric:  DENIES: Anxiety, Confusion, Mood changes, Depression, 

Hallucinations, Agitation, Suicidal Ideation, Homicidal Ideation, Delusions





Past Family Social History


Coded Allergies:  


     morphine (Unverified  Allergy, Severe, anaphylactic, 1/25/18)


Unable to Obtain Active Prescriptions or Reported Meds





Current Medications








 Medications


  (Trade)  Dose


 Ordered  Sig/Alyssa


 Route  Start Time


 Stop Time Status Last Admin


 


  (NS Flush)  2 ml  UNSCH  PRN


 IV FLUSH  1/26/18 03:00


    2/7/18 10:47


 


 


  (NS Flush)  2 ml  BID


 IV FLUSH  1/26/18 09:00


    2/7/18 21:04


 


 


  (Zofran Inj)  4 mg  Q6H  PRN


 IV PUSH  1/26/18 03:00


     


 


 


  (Duoneb Neb)  1 ampule  Q4HR NEB  PRN


 INH  1/26/18 03:00


     


 


 


 Miscellaneous


 Information  1  Q361D


 XX  1/26/18 03:00


     


 


 


  (Chlorhexidine


 2% Cloth)  


 Taper  DAILY@04


 TOP  1/26/18 04:00


 1/22/19 03:59  2/8/18 04:00


 


 


  (Chlorhexidine


 2% Cloth)  3 pack  UNSCH  PRN


 TOP  1/26/18 03:00


     


 


 


  (Tessie-Colace)  1 tab  BID


 PO  1/26/18 09:00


    2/8/18 09:08


 


 


  (Milk Of


 Magnesia Liq)  30 ml  Q12H  PRN


 PO  1/26/18 03:00


     


 


 


  (Senokot)  17.2 mg  Q12H  PRN


 PO  1/26/18 03:00


     


 


 


  (Dulcolax Supp)  10 mg  DAILY  PRN


 RECTAL  1/26/18 03:00


     


 


 


  (Lactulose Liq)  30 ml  DAILY  PRN


 PO  1/26/18 03:00


     


 


 


  (D50w (Vial) Inj)  50 ml  UNSCH  PRN


 IV PUSH  1/26/18 08:45


     


 


 


  (Glucagon Inj)  1 mg  UNSCH  PRN


 OTHER  1/26/18 08:45


     


 


 


  (Protonix)  40 mg  DAILY


 PO  1/26/18 12:00


    2/8/18 09:08


 


 


 Sodium Chloride  1,000 ml @ 


 0 mls/hr  Q0M PRN


 OTHER  1/26/18 13:04


     


 


 


  (Heparin Inj)  8,000 units  UNSCH  PRN


 IV FLUSH  1/26/18 13:15


     


 


 


 Sodium Chloride  1,000 ml @ 


 200 mls/hr  Q5H PRN


 IV  1/26/18 13:04


    2/2/18 10:37


 


 


 Sodium Chloride  1,000 ml @ 


 0 mls/hr  Q0M PRN


 OTHER  1/26/18 13:04


     


 


 


  (Mannitol Inj)  12.5 gm  UNSCH  PRN


 IV  1/26/18 13:15


    1/26/18 20:10


 


 


 Albumin Human  100 ml @ 


 60 mls/hr  UNSCH  PRN


 IV  1/26/18 13:15


    2/5/18 10:37


 


 


  (NS Flush)  5 ml  UNSCH  PRN


 IV FLUSH  1/26/18 13:15


    2/7/18 10:46


 


 


  (Heparin Inj)    UNSCH  PRN


 .XX  1/26/18 13:15


     


 


 


  (Gentamicin


  (Dialysis) Inj)  20 mg  UNSCH  PRN


 OTHER  1/26/18 13:15


     


 


 


  (Zofran Inj)  4 mg  UNSCH  PRN


 IV PUSH  1/26/18 13:15


     


 


 


  (Tylenol)  650 mg  UNSCH  PRN


 PO  1/26/18 13:15


    2/7/18 21:03


 


 


  (Benadryl)  25 mg  UNSCH  PRN


 PO  1/26/18 13:15


     


 


 


  (Nitrostat Sl)  0.4 mg  UNSCH  PRN


 SL  1/26/18 13:15


     


 


 


  (Catapres)  0.1 mg  UNSCH  PRN


 PO  1/26/18 13:15


     


 


 


  (Epogen Inj)  10,000 units  UNSCH  PRN


 IV PUSH  1/26/18 13:15


    2/5/18 10:37


 


 


  (Gelfoam 12 Mm/7


 Mm Top)  1 foam  UNSCH  PRN


 TOP  1/26/18 13:15


    2/5/18 10:38


 


 


  (NovoLIN R


 SUPPLEMENTAL


 SCALE)  1  ACHS


 SQ  1/29/18 08:00


    2/7/18 21:00


 


 


  (Tums Chew)  500 mg  Q12HR


 CHEW  1/29/18 21:00


    2/8/18 09:08


 


 


  (Albuterol Neb)  2.5 mg  Q2HR NEB  PRN


 NEB  1/31/18 12:00


     


 


 


  (Heparin Inj)  5,000 units  Q12HR


 SQ  1/31/18 21:00


    2/8/18 09:08


 


 


 Phenylephrine HCl


 160 mg/Dextrose  500 ml @ 


 7.5 mls/hr  TITRATE  PRN


 IV  2/1/18 11:00


    2/5/18 04:27


 


 


  (Brethine Inj)  1 mg  UNSCH  PRN


 SQ  2/1/18 11:00


     


 


 


  (Diflucan)  200 mg  DAILY


 PO  2/3/18 09:00


 2/13/18 10:00  2/8/18 09:08


 


 


  (Proamatine)  10 mg  TID@07,12,17


 PO  2/2/18 17:00


    2/8/18 07:00


 


 


  (Rocaltrol)  0.25 mcg  DAILY


 PO  2/7/18 11:45


    2/8/18 09:08


 








Family Psych History


No family psychiatric history


Social History


Patient was born and raised in [place], he lives in [place] is wife, he 

has a son, his retired, his highest level of education is a college degree, he 

used to work as a  in [place].


Patient's Strengths (min. 2)


Family support, no previous psychiatric history





Physical Exam


Vital Signs





Vital Signs








  Date Time  Temp Pulse Resp B/P (MAP) Pulse Ox O2 Delivery O2 Flow Rate FiO2


 


2/8/18 06:00  97      


 


2/8/18 04:00 98.0  12 106/56 (73)    


 


2/8/18 00:00     93   


 


2/7/18 21:11        21














I/O   


 


 2/8/18 2/8/18 2/9/18





 08:00 16:00 00:00


 


Output Total 0 ml  


 


Balance 0 ml  








Lab Results











Test


  2/8/18


03:45


 


White Blood Count 7.3 TH/MM3 


 


Red Blood Count 3.20 MIL/MM3 


 


Hemoglobin 10.3 GM/DL 


 


Hematocrit 30.4 % 


 


Mean Corpuscular Volume 95.2 FL 


 


Mean Corpuscular Hemoglobin 32.2 PG 


 


Mean Corpuscular Hemoglobin


Concent 33.9 % 


 


 


Red Cell Distribution Width 21.4 % 


 


Platelet Count 163 TH/MM3 


 


Mean Platelet Volume 7.6 FL 


 


Blood Urea Nitrogen 33 MG/DL 


 


Creatinine 3.36 MG/DL 


 


Random Glucose 119 MG/DL 


 


Calcium Level 7.5 MG/DL 


 


Sodium Level 140 MEQ/L 


 


Potassium Level 4.2 MEQ/L 


 


Chloride Level 100 MEQ/L 


 


Carbon Dioxide Level 31.0 MEQ/L 


 


Anion Gap 9 MEQ/L 


 


Estimat Glomerular Filtration


Rate 18 ML/MIN 


 














 Date/Time


Source Procedure


Growth Status


 


 


 1/26/18 07:19


Blood Peripheral Aerobic Blood Culture - Final


NO GROWTH IN 5 DAYS Complete


 


 1/26/18 07:19


Blood Peripheral Anaerobic Blood Culture - Final


NO GROWTH IN 5 DAYS Complete





 1/26/18 15:20


Fluid Peritoneal Fluid Fungal Smear - Final


NO FUNGAL ELEMENTS SEEN. Resulted


 


 1/26/18 15:20 Fungal Culture - Preliminary


Candida Albicans Resulted











Mental Status Examination


Appearance:  Appropriate


Consciousness:  Alert


Orientation:  x4


Motor Activity:  Normal gait


Speech:  Unremarkable


Language:  Adequate


Fund of Knowledge:  Adequate


Attention and Concentration:  Adequate


Memory:  Unremarkable


Mood:  Appropriate


Affect:  Appropriate


Thought Process & Associations:  Intact


Thought Content:  Appropriate


Hallucination Type:  None


Delusion Type:  None


Suicidal Ideation:  No


Suicidal Plan:  No


Suicidal Intention:  No


Homicidal Ideation:  No


Homicidal Plan:  No


Homicidal Intention:  No


Insight:  Adequate


Judgment:  Adequate





Assessment & Plan


Problem List:  


(1) Psychological factors affecting medical condition


ICD Codes:  F54 - Psychological and behavioral factors associated with 

disorders or diseases classified elsewhere


Assessment & Plan:  On psychiatric evaluation today the patient does not 

present any acute, significant neuropsychiatric symptoms that requires 

immediate psychiatric intervention.  Patient denies suicidal and homicidal 

ideation, he denies visual and auditory hallucinations.  The patient is fully 

oriented 3.  There is no evidence of delirium, attention deficit, fluctuation 

of consciousness at this moment.  The patient is completely logical, coherent 

and relevant.  He is able to express a clear choice of continuing his medical 

treatment, signing DNR, but not wish to be DNI.  His wife is healthcare by 

proxy.  The patient clearly have decision-making capacity to make this decision 

at this moment.





(2) Peritonitis associated with peritoneal dialysis


ICD Codes:  T85.71XA - Infection and inflammatory reaction due to peritoneal 

dialysis catheter, initial encounter


(3) ESRD (end stage renal disease) on dialysis


ICD Codes:  N18.6 - End stage renal disease; Z99.2 - Dependence on renal 

dialysis


Assessment & Plan


Estimated LOS:  days











Marin Rowland MD Feb 8, 2018 12:09

## 2018-02-09 VITALS
RESPIRATION RATE: 18 BRPM | OXYGEN SATURATION: 96 % | DIASTOLIC BLOOD PRESSURE: 61 MMHG | HEART RATE: 100 BPM | SYSTOLIC BLOOD PRESSURE: 97 MMHG | TEMPERATURE: 97.4 F

## 2018-02-09 VITALS — OXYGEN SATURATION: 94 %

## 2018-02-09 VITALS
HEART RATE: 103 BPM | OXYGEN SATURATION: 93 % | SYSTOLIC BLOOD PRESSURE: 89 MMHG | DIASTOLIC BLOOD PRESSURE: 55 MMHG | TEMPERATURE: 97.2 F | RESPIRATION RATE: 14 BRPM

## 2018-02-09 VITALS
TEMPERATURE: 97.9 F | RESPIRATION RATE: 17 BRPM | HEART RATE: 146 BPM | OXYGEN SATURATION: 97 % | DIASTOLIC BLOOD PRESSURE: 55 MMHG | SYSTOLIC BLOOD PRESSURE: 93 MMHG

## 2018-02-09 VITALS
RESPIRATION RATE: 18 BRPM | OXYGEN SATURATION: 96 % | DIASTOLIC BLOOD PRESSURE: 52 MMHG | HEART RATE: 85 BPM | TEMPERATURE: 97.6 F | SYSTOLIC BLOOD PRESSURE: 90 MMHG

## 2018-02-09 VITALS
DIASTOLIC BLOOD PRESSURE: 53 MMHG | RESPIRATION RATE: 14 BRPM | HEART RATE: 105 BPM | OXYGEN SATURATION: 94 % | SYSTOLIC BLOOD PRESSURE: 88 MMHG | TEMPERATURE: 97.6 F

## 2018-02-09 VITALS — HEART RATE: 103 BPM

## 2018-02-09 VITALS — HEART RATE: 120 BPM

## 2018-02-09 VITALS — HEART RATE: 94 BPM

## 2018-02-09 LAB
BASOPHILS # BLD AUTO: 0.1 TH/MM3 (ref 0–0.2)
BASOPHILS NFR BLD: 0.6 % (ref 0–2)
BUN SERPL-MCNC: 48 MG/DL (ref 7–18)
CALCIUM SERPL-MCNC: 6.9 MG/DL (ref 8.5–10.1)
CALCIUM TP COR SERPL-MCNC: 7.9 MG/DL (ref 8.5–10.1)
CHLORIDE SERPL-SCNC: 102 MEQ/L (ref 98–107)
CREAT SERPL-MCNC: 3.99 MG/DL (ref 0.6–1.3)
EOSINOPHIL # BLD: 0.1 TH/MM3 (ref 0–0.4)
EOSINOPHIL NFR BLD: 1.3 % (ref 0–4)
ERYTHROCYTE [DISTWIDTH] IN BLOOD BY AUTOMATED COUNT: 22 % (ref 11.6–17.2)
GFR SERPLBLD BASED ON 1.73 SQ M-ARVRAT: 15 ML/MIN (ref 89–?)
GLUCOSE SERPL-MCNC: 43 MG/DL (ref 74–106)
HBA1C MFR BLD: 5.2 % (ref 4.3–6)
HCO3 BLD-SCNC: 26.4 MEQ/L (ref 21–32)
HCT VFR BLD CALC: 29.4 % (ref 39–51)
HGB BLD-MCNC: 10 GM/DL (ref 13–17)
LYMPHOCYTES # BLD AUTO: 0.4 TH/MM3 (ref 1–4.8)
LYMPHOCYTES NFR BLD AUTO: 5.1 % (ref 9–44)
MCH RBC QN AUTO: 33.3 PG (ref 27–34)
MCHC RBC AUTO-ENTMCNC: 34.1 % (ref 32–36)
MCV RBC AUTO: 97.7 FL (ref 80–100)
MONOCYTE #: 0.5 TH/MM3 (ref 0–0.9)
MONOCYTES NFR BLD: 6.7 % (ref 0–8)
NEUTROPHILS # BLD AUTO: 6.8 TH/MM3 (ref 1.8–7.7)
NEUTROPHILS NFR BLD AUTO: 86.3 % (ref 16–70)
PLATELET # BLD: 160 TH/MM3 (ref 150–450)
PMV BLD AUTO: 7.5 FL (ref 7–11)
PROT SERPL-MCNC: 5.1 GM/DL (ref 6.4–8.2)
RBC # BLD AUTO: 3.01 MIL/MM3 (ref 4.5–5.9)
SODIUM SERPL-SCNC: 140 MEQ/L (ref 136–145)
WBC # BLD AUTO: 7.8 TH/MM3 (ref 4–11)

## 2018-02-09 RX ADMIN — CALCITRIOL SCH MCG: 0.25 CAPSULE, GELATIN COATED ORAL at 08:34

## 2018-02-09 RX ADMIN — MIDODRINE HYDROCHLORIDE SCH MG: 5 TABLET ORAL at 06:32

## 2018-02-09 RX ADMIN — STANDARDIZED SENNA CONCENTRATE AND DOCUSATE SODIUM SCH TAB: 8.6; 5 TABLET, FILM COATED ORAL at 08:34

## 2018-02-09 RX ADMIN — HUMAN INSULIN SCH: 100 INJECTION, SOLUTION SUBCUTANEOUS at 08:00

## 2018-02-09 RX ADMIN — HUMAN INSULIN SCH: 100 INJECTION, SOLUTION SUBCUTANEOUS at 22:43

## 2018-02-09 RX ADMIN — STANDARDIZED SENNA CONCENTRATE AND DOCUSATE SODIUM SCH TAB: 8.6; 5 TABLET, FILM COATED ORAL at 21:00

## 2018-02-09 RX ADMIN — CALCIUM CARBONATE (ANTACID) CHEW TAB 500 MG SCH MG: 500 CHEW TAB at 08:34

## 2018-02-09 RX ADMIN — Medication SCH ML: at 08:35

## 2018-02-09 RX ADMIN — HUMAN INSULIN SCH: 100 INJECTION, SOLUTION SUBCUTANEOUS at 11:34

## 2018-02-09 RX ADMIN — HUMAN INSULIN SCH: 100 INJECTION, SOLUTION SUBCUTANEOUS at 18:59

## 2018-02-09 RX ADMIN — ALBUMIN HUMAN SCH MLS/HR: 0.25 SOLUTION INTRAVENOUS at 18:46

## 2018-02-09 RX ADMIN — CHLORHEXIDINE GLUCONATE SCH PACK: 500 CLOTH TOPICAL at 03:49

## 2018-02-09 RX ADMIN — CALCIUM CARBONATE (ANTACID) CHEW TAB 500 MG SCH MG: 500 CHEW TAB at 22:41

## 2018-02-09 RX ADMIN — HEPARIN SODIUM SCH UNITS: 10000 INJECTION, SOLUTION INTRAVENOUS; SUBCUTANEOUS at 21:00

## 2018-02-09 RX ADMIN — FLUCONAZOLE SCH MG: 200 TABLET ORAL at 08:34

## 2018-02-09 RX ADMIN — HEPARIN SODIUM SCH UNITS: 10000 INJECTION, SOLUTION INTRAVENOUS; SUBCUTANEOUS at 08:34

## 2018-02-09 RX ADMIN — MIDODRINE HYDROCHLORIDE SCH MG: 5 TABLET ORAL at 18:47

## 2018-02-09 RX ADMIN — Medication SCH ML: at 22:42

## 2018-02-09 RX ADMIN — PANTOPRAZOLE SCH MG: 40 TABLET, DELAYED RELEASE ORAL at 08:36

## 2018-02-09 RX ADMIN — MIDODRINE HYDROCHLORIDE SCH MG: 5 TABLET ORAL at 14:00

## 2018-02-09 NOTE — HHI.GIFU
Subjective


Remarks


Pt resting in bed eating breakfast.  Denies any GI complaints.  Denies any 

continued melanotic stool, nausea, vomiting.  


 (Ernestina Gallo)





Objective


Vitals I&O





Vital Signs








  Date Time  Temp Pulse Resp B/P (MAP) Pulse Ox O2 Delivery O2 Flow Rate FiO2


 


2/9/18 08:00 97.2 103 14 89/55 (66) 93   


 


2/9/18 04:35 97.4 100 18 97/61 (73) 96   


 


2/9/18 04:35      Room Air  


 


2/9/18 04:00  94      


 


2/9/18 00:00 97.6 85 18 90/52 (65) 96   


 


2/9/18 00:00      Room Air  


 


2/8/18 23:50  95      


 


2/8/18 22:12  87      


 


2/8/18 21:15      Room Air  


 


2/8/18 21:15 97.8 82 22 97/56 (70) 94   


 


2/8/18 21:09     100   21


 


2/8/18 20:00  100      


 


2/8/18 20:00 97.9 100 18 124/61 (82) 95   


 


2/8/18 18:00  120      


 


2/8/18 17:00  131      


 


2/8/18 17:00  131 17 121/59 (79) 93   


 


2/8/18 16:00  124 19 111/85 (94)    


 


2/8/18 16:00  124      


 


2/8/18 15:00  121 19 108/64 (79) 96   


 


2/8/18 15:00  121      


 


2/8/18 14:00  129      


 


2/8/18 14:00  129 20 107/66 (80) 98   


 


2/8/18 13:01  140 22 116/75 (89) 80   


 


2/8/18 13:01  140      


 


2/8/18 12:45  107 15  97   


 


2/8/18 12:30  115 16  99   


 


2/8/18 12:15  120 21     


 


2/8/18 12:00  110      


 


2/8/18 12:00  110 14 103/57 (72)    


 


2/8/18 12:00 97.6 110 14 103/57 (72)    


 


2/8/18 11:45  108 17     


 


2/8/18 11:30  126 18     


 


2/8/18 11:15  111 22     


 


2/8/18 11:00  101      


 


2/8/18 11:00  101 18 104/63 (77) 78   


 


2/8/18 11:00  101 18 104/63 (77) 78   


 


2/8/18 10:45  110 14  99   














I/O      


 


 2/8/18 2/8/18 2/8/18 2/9/18 2/9/18 2/9/18





 07:00 15:00 23:00 07:00 15:00 23:00


 


Intake Total   240 ml 360 ml  


 


Output Total 0 ml     


 


Balance 0 ml  240 ml 360 ml  


 


      


 


Intake Oral   240 ml 360 ml  


 


Output Urine Total 0 ml     


 


# Voids    0  


 


# Bowel Movements   2 1  








Laboratory





Laboratory Tests








Test


  2/9/18


06:31


 


White Blood Count 7.8 


 


Red Blood Count 3.01 


 


Hemoglobin 10.0 


 


Hematocrit 29.4 


 


Mean Corpuscular Volume 97.7 


 


Mean Corpuscular Hemoglobin 33.3 


 


Mean Corpuscular Hemoglobin


Concent 34.1 


 


 


Red Cell Distribution Width 22.0 


 


Platelet Count 160 


 


Mean Platelet Volume 7.5 


 


Neutrophils (%) (Auto) 86.3 


 


Lymphocytes (%) (Auto) 5.1 


 


Monocytes (%) (Auto) 6.7 


 


Eosinophils (%) (Auto) 1.3 


 


Basophils (%) (Auto) 0.6 


 


Neutrophils # (Auto) 6.8 


 


Lymphocytes # (Auto) 0.4 


 


Monocytes # (Auto) 0.5 


 


Eosinophils # (Auto) 0.1 


 


Basophils # (Auto) 0.1 


 


CBC Comment DIFF FINAL 


 


Differential Comment  


 


Blood Urea Nitrogen 48 


 


Creatinine 3.99 


 


Random Glucose 43 


 


Total Protein 5.1 


 


Calcium Level 6.9 


 


Sodium Level 140 


 


Potassium Level 5.0 


 


Chloride Level 102 


 


Carbon Dioxide Level 26.4 


 


Anion Gap 12 


 


Estimat Glomerular Filtration


Rate 15 


 


 


Protein Corrected Calcium 7.9 














 Date/Time


Source Procedure


Growth Status


 


 


 1/26/18 07:19


Blood Peripheral Aerobic Blood Culture - Final


NO GROWTH IN 5 DAYS Complete


 


 1/26/18 07:19


Blood Peripheral Anaerobic Blood Culture - Final


NO GROWTH IN 5 DAYS Complete





 1/26/18 15:20


Fluid Peritoneal Fluid Fungal Smear - Final


NO FUNGAL ELEMENTS SEEN. Resulted


 


 1/26/18 15:20 Fungal Culture - Preliminary


Candida Albicans Resulted








Physical Exam


HEENT:Normocephalic; atraumatic


CHEST:  Even/unlabored , 


CARDIAC:  RRR 


ABDOMEN:  Irregularly irregular 


EXTREMITIES: No clubbing, cyanosis, or edema.


CNS: Alert and oriented x 3


 (Ernestina Gallo)





Assessment and Plan


Assessment:  


(1) Anemia


ICD Codes:  D64.9 - Anemia, unspecified


(2) GI bleed


ICD Codes:  K92.2 - Gastrointestinal hemorrhage, unspecified


Status:  Acute


(3) History of renal failure


ICD Codes:  Z87.448 - Personal history of other diseases of urinary system


Status:  Acute


Plan


Assessment:


- History of GIB- previously following this patient to evaluate for anemia and 

melena. 


  S/P EGD --> Severe grade D esophagitis, severe gastritis, anemia could be 

related


  to chronic disease plus blood loss from the upper GI tract.  Pathology --> 

Gastric


  mucosa without significant histopathologic abnormality.  (small intestine) 

Fibrinopurulent


  exudate and inflamed granulation tissue, consistent with ulcer base. We 

recommended pt to


  continue Protonix, better control of diabetes, and avoid NSAIDs.  GI signed 

off and 


  recommended pt follow up on outpatient basis for colonoscopy.  Dr. Arredondo has 


  asked us to evaluate pt and determine if anticoagulation can be restarted.  

Pt  


  denies any continue melena, nausea, vomiting.  H/H currently 10/29.4, last 

transfused


  2 U PRBCs on Feb 7th with dialysis.  No obvious continuation of GIB, anemia 

likely


  secondary to renal insufficiency.  OK to restart anticoagulation for a GI 

standpoint.  





Plan:


OK to restart anticoagulation from GI standpoint


Continue Protonix


Avoid NSAIDs


Have pt follow up with GI after discharge


Colonoscopy outpatient


GI will sign off, please reconsult as needed





Pt has been seen and examined by myself and Dr. Elena and this note is written 

on her behalf 


 (Ernestina Gallo)


Physician Comments


seen, examined


agree with above 


 (Nathalia Elena MD)





Problem Qualifiers





(1) Anemia:  





(2) GI bleed:  


Qualified Codes:  K92.2 - Gastrointestinal hemorrhage, unspecified








Ernestina Gallo Feb 9, 2018 10:53


Nathalia Elena MD Feb 9, 2018 19:54

## 2018-02-09 NOTE — HHI.NPPN
Subjective


General Problems:  Anemia


Renal Failure:  End Stage Renal Disease


History of Present Illness


Patient is a very pleasant 67 year old male who came into the emergency room on 

01/26/18 hypotensive, lethargic, and with a hemoglobin of 6.7.  He was 

transfused 2 units of PRBC's with a recheck pending.  He was initially put on 

Levophed for blood pressure support however it is currently not running. Has a 

past medical history of End -stage renal disease with peritoneal dialysis, AFIB

, diabetes, arthritis, chronic pain, and hx of strokes.    Onset of acute 

epigastric pain and discomfort noted for 5 days ago, but worsened over the past 

24 hours.  Reported loose stools for 3 weeks.  In the emergency room patient 

did report melena, nausea, mild vomiting undigested food yesterday, but denied 

any coffee ground emesis. Patient has AV fistula and will have dialysis while 

here.  Per patient PD catheter needs to be removed secondary to infection.


Additional Remarks


Patient is alert, now eating breakfast, not in distress.





Review of Systems


General


Constitutional:  Fatigue


General Remarks


weakness





Ears, Nose, & Throat


ENT Remarks


Dry mouth





Respiratory


Respiratory Remarks


Denies SOB





Cardiovascular


Cardiac Remarks


Denies CP





Gastrointestinal


GI Remarks


Denies abdominal pain





Objective Data


Data





Vital Signs








  Date Time  Temp Pulse Resp B/P (MAP) Pulse Ox O2 Delivery O2 Flow Rate FiO2


 


2/9/18 08:00 97.2 103 14 89/55 (66) 93   


 


2/9/18 04:35 97.4 100 18 97/61 (73) 96   


 


2/9/18 04:35      Room Air  


 


2/9/18 04:00  94      


 


2/9/18 00:00 97.6 85 18 90/52 (65) 96   


 


2/9/18 00:00      Room Air  


 


2/8/18 23:50  95      


 


2/8/18 22:12  87      


 


2/8/18 21:15      Room Air  


 


2/8/18 21:15 97.8 82 22 97/56 (70) 94   


 


2/8/18 21:09     100   21


 


2/8/18 20:00  100      


 


2/8/18 20:00 97.9 100 18 124/61 (82) 95   


 


2/8/18 18:00  120      


 


2/8/18 17:00  131      


 


2/8/18 17:00  131 17 121/59 (79) 93   


 


2/8/18 16:00  124 19 111/85 (94)    


 


2/8/18 16:00  124      


 


2/8/18 15:00  121 19 108/64 (79) 96   


 


2/8/18 15:00  121      


 


2/8/18 14:00  129      


 


2/8/18 14:00  129 20 107/66 (80) 98   


 


2/8/18 13:01  140 22 116/75 (89) 80   


 


2/8/18 13:01  140      


 


2/8/18 12:45  107 15  97   


 


2/8/18 12:30  115 16  99   


 


2/8/18 12:15  120 21     


 


2/8/18 12:00  110      


 


2/8/18 12:00  110 14 103/57 (72)    


 


2/8/18 12:00 97.6 110 14 103/57 (72)    


 


2/8/18 11:45  108 17     


 


2/8/18 11:30  126 18     


 


2/8/18 11:15  111 22     


 


2/8/18 11:00  101      


 


2/8/18 11:00  101 18 104/63 (77) 78   


 


2/8/18 11:00  101 18 104/63 (77) 78   


 


2/8/18 10:45  110 14  99   


 


2/8/18 10:30  99 11     


 


2/8/18 10:15  104 16  92   


 


2/8/18 10:00  99      


 


2/8/18 10:00  99 12 92/60 (71) 91   


 


2/8/18 10:00  99 12 92/60 (71) 91   








-:  


2/9/18 0631                                                                    

            2/9/18 0631








Physical Exam


General


Appearance:  Malnourished





Neck


Neck Exam:  Neck Supple





Pulmonary


Resp Exam:  Clear Bilaterally, No Distress





Cardiology


CV Exam:  Regular





Gastrointestinal/Abdomen


GI Exam:  Soft, Non-Tender





Genitourinary


 Exam:  Flank Non-Tender





Extremeties


Extremities Exam:  No Edema





Assessment/Plan


Problem List:  


(1) ESRD (end stage renal disease) on dialysis


ICD Codes:  N18.6 - End stage renal disease; Z99.2 - Dependence on renal 

dialysis


Plan:  Dialysis MWF


Calcuim 7.5 today on oral replacement. 


HGB 10.0 today after 2 units of PRBC's  on 2/7.


Continue on midodrine 10 mg TID 


HD today, remove less fluid as BP is low.


Placement to Rehab.





(2) Peritonitis associated with peritoneal dialysis


ICD Codes:  T85.71XA - Infection and inflammatory reaction due to peritoneal 

dialysis catheter, initial encounter


Plan:  Peritoneal fluid culture positive for Candida. 


On fluconazole with stop date of the 13th


PD catheter removed


ID and surgery following. 





(3) Hypotension


ICD Codes:  I95.9 - Hypotension, unspecified


Status:  Acute


Plan:  Sepsis present on admission. 


Continue supportive care. 





(4) GI bleed


ICD Codes:  K92.2 - Gastrointestinal hemorrhage, unspecified


Status:  Acute


Plan:  s/p EGD: duodenal ulcer, gastritis, esophagitis. 


Received 2 units PRBC on admission.








(5) Anemia


ICD Codes:  D64.9 - Anemia, unspecified


Plan:  HGB  10.5 Epogen 10,000 units with each dialysis


Transfused 2 units of PRBC yesterday








Problem Qualifiers





(1) Hypotension:  


Qualified Codes:  I95.9 - Hypotension, unspecified


(2) GI bleed:  


Qualified Codes:  K92.2 - Gastrointestinal hemorrhage, unspecified


(3) Anemia:  








Carlita Hoang MD Feb 9, 2018 09:48

## 2018-02-09 NOTE — HHI.PR
Subjective


Remarks


Follow-up GI bleed.  No further GI bleeding per discussed discussed with GI 

ARNP okay to restart anticoagulation/antiplatelets.  Patient has borderline low 

BP denies dizziness.  Discussed with nephrology will start IV albumin patient 

already on ProAmatine





Objective


Vitals





Vital Signs








  Date Time  Temp Pulse Resp B/P (MAP) Pulse Ox O2 Delivery O2 Flow Rate FiO2


 


2/9/18 12:00      Room Air  


 


2/9/18 11:42 97.6 105 14 88/53 (65) 94   


 


2/9/18 11:33  120      


 


2/9/18 08:00 97.2 103 14 89/55 (66) 93   


 


2/9/18 08:00      Room Air  


 


2/9/18 07:36  103      


 


2/9/18 04:35 97.4 100 18 97/61 (73) 96   


 


2/9/18 04:35      Room Air  


 


2/9/18 04:00  94      


 


2/9/18 00:00 97.6 85 18 90/52 (65) 96   


 


2/9/18 00:00      Room Air  


 


2/8/18 23:50  95      


 


2/8/18 22:12  87      


 


2/8/18 21:15      Room Air  


 


2/8/18 21:15 97.8 82 22 97/56 (70) 94   


 


2/8/18 21:09     100   21


 


2/8/18 20:00  100      


 


2/8/18 20:00 97.9 100 18 124/61 (82) 95   


 


2/8/18 18:00  120      


 


2/8/18 17:00  131      


 


2/8/18 17:00  131 17 121/59 (79) 93   


 


2/8/18 16:00  124 19 111/85 (94)    


 


2/8/18 16:00  124      














I/O      


 


 2/8/18 2/8/18 2/8/18 2/9/18 2/9/18 2/9/18





 07:00 15:00 23:00 07:00 15:00 23:00


 


Intake Total   240 ml 360 ml  


 


Output Total 0 ml     


 


Balance 0 ml  240 ml 360 ml  


 


      


 


Intake Oral   240 ml 360 ml  


 


Output Urine Total 0 ml     


 


# Voids    0  


 


# Bowel Movements   2 1  








Result Diagram:  


2/9/18 0631                                                                    

            2/9/18 0631





Imaging





Last Impressions








Chest X-Ray 1/27/18 0000 Signed





Impressions: 





 Service Date/Time:  Saturday, January 27, 2018 03:15 - CONCLUSION:  Patchy 





 consolidation or atelectasis of the left lower lung.     Chucky Morton MD 








Objective Remarks


GENERAL: 67 year  male sitting up in bed, appears comfortable


EYES:  EOMI


ENT: No nasal bleeding or discharge.  Mucous membranes pink and dry..


NECK: Trachea midline.  Supple.


CARDIOVASCULAR: Heart rate in the 90's  Irregularly irregular consistent with 

A. fib.


RESPIRATORY:  Clear to auscultation. Breath sounds equal bilaterally.  

Decreased respiratory effort.


GASTROINTESTINAL: Abdomen soft, nondistended. incision healing well, no signs 

of infection/no drainage.  


MUSCULOSKELETAL: Patient has a well-healed rt heel ulcer.  Venous stasis 

changes noted to his bilateral lower extremities.


NEUROLOGICAL: Awake and weak. No obvious cranial nerve deficits.  Moves 

bilateral upper extremities.


BACK: Stage I/early decubitus in his sacral area


Procedures


EGD and removal of PD catheter


Date of Insertion:  Jan 25, 2018


Line:  Central Venous Catheter


Side:  Right


Location:  Internal, Jugular





A/P


Problem List:  


(1) GI bleed


ICD Code:  K92.2 - Gastrointestinal hemorrhage, unspecified


Status:  Acute


Assessment and Plan


Upper GI bleed secondary to duodenal ulcer.  Stable continue PPI and avoid 

NSAIDs.  Status post EGD which showed 2 large duodenal ulcers and grade D 

esophagitis and gastritis.


History of essential hypertension.  Patient has been having low BP on 

ProAmatine.  Cortisol of 20.  Low albumin will start IV albumin


End-stage renal disease on hemodialysis. Monitor renal function, I/O's, avoid 

nephrotoxins.


History of CVA 1986/2007.  Stable restart Plavix okay with GI


Atrial fibrillation rate controlled.  Intermittent RVR.  Patient is 

asymptomatic.  Unable to start rate limiting agents secondary to borderline BP.

  Not a candidate for digoxin therapy secondary to end-stage renal disease.  

Plavix as mentioned


Macrocytic anemia.  Required transfusion of 2 units.  Currently stable


Diabetes mellitus.  Stable continue fingersticks monitoring of home meds


Elevated TSH - repeat in 3-6 weeks.  Free T4 normal.  Free T3 low.  Would not 

initiate any levothyroxine at this time in this acute clinical setting


Infected peritoneal dialysis catheter/peritonitis fungal.  Status post removal 

of PD catheter.  Continue Diflucan until February 13


Right heel and Sacral decubitus ulcer.  Wound care


Depression.  Stable continue to hold Paxil


Chronic Pain syndrome.  Stable


Prophylaxis: Pantoprazole/heparin subcutaneous, monitor H&H closely. If 

continues to drop, need to hold heparin.


Discharge Planning


Needs rehab but unable to afford co-pay





Problem Qualifiers





(1) GI bleed:  


Qualified Codes:  K92.2 - Gastrointestinal hemorrhage, unspecified








Phil Arredondo MD Feb 9, 2018 15:09

## 2018-02-10 VITALS
SYSTOLIC BLOOD PRESSURE: 99 MMHG | RESPIRATION RATE: 18 BRPM | OXYGEN SATURATION: 91 % | DIASTOLIC BLOOD PRESSURE: 62 MMHG | TEMPERATURE: 97.9 F | HEART RATE: 112 BPM

## 2018-02-10 VITALS
SYSTOLIC BLOOD PRESSURE: 104 MMHG | DIASTOLIC BLOOD PRESSURE: 67 MMHG | RESPIRATION RATE: 17 BRPM | OXYGEN SATURATION: 98 % | HEART RATE: 62 BPM | TEMPERATURE: 97.5 F

## 2018-02-10 VITALS
OXYGEN SATURATION: 98 % | SYSTOLIC BLOOD PRESSURE: 90 MMHG | RESPIRATION RATE: 17 BRPM | TEMPERATURE: 97.3 F | DIASTOLIC BLOOD PRESSURE: 55 MMHG | HEART RATE: 106 BPM

## 2018-02-10 VITALS
OXYGEN SATURATION: 92 % | SYSTOLIC BLOOD PRESSURE: 102 MMHG | TEMPERATURE: 97.3 F | HEART RATE: 95 BPM | RESPIRATION RATE: 18 BRPM | DIASTOLIC BLOOD PRESSURE: 57 MMHG

## 2018-02-10 VITALS
TEMPERATURE: 98.2 F | OXYGEN SATURATION: 93 % | DIASTOLIC BLOOD PRESSURE: 55 MMHG | HEART RATE: 93 BPM | RESPIRATION RATE: 18 BRPM | SYSTOLIC BLOOD PRESSURE: 93 MMHG

## 2018-02-10 VITALS
HEART RATE: 110 BPM | OXYGEN SATURATION: 95 % | SYSTOLIC BLOOD PRESSURE: 96 MMHG | DIASTOLIC BLOOD PRESSURE: 54 MMHG | RESPIRATION RATE: 20 BRPM | TEMPERATURE: 98.1 F

## 2018-02-10 VITALS
RESPIRATION RATE: 20 BRPM | SYSTOLIC BLOOD PRESSURE: 98 MMHG | TEMPERATURE: 98 F | OXYGEN SATURATION: 95 % | HEART RATE: 112 BPM | DIASTOLIC BLOOD PRESSURE: 55 MMHG

## 2018-02-10 VITALS
SYSTOLIC BLOOD PRESSURE: 133 MMHG | TEMPERATURE: 98.3 F | HEART RATE: 110 BPM | RESPIRATION RATE: 20 BRPM | DIASTOLIC BLOOD PRESSURE: 54 MMHG | OXYGEN SATURATION: 96 %

## 2018-02-10 VITALS — OXYGEN SATURATION: 95 %

## 2018-02-10 VITALS
SYSTOLIC BLOOD PRESSURE: 101 MMHG | TEMPERATURE: 97.5 F | HEART RATE: 96 BPM | OXYGEN SATURATION: 94 % | DIASTOLIC BLOOD PRESSURE: 68 MMHG | RESPIRATION RATE: 18 BRPM

## 2018-02-10 VITALS
RESPIRATION RATE: 20 BRPM | OXYGEN SATURATION: 95 % | SYSTOLIC BLOOD PRESSURE: 98 MMHG | HEART RATE: 110 BPM | DIASTOLIC BLOOD PRESSURE: 55 MMHG | TEMPERATURE: 98 F

## 2018-02-10 VITALS
DIASTOLIC BLOOD PRESSURE: 61 MMHG | OXYGEN SATURATION: 93 % | TEMPERATURE: 97.8 F | HEART RATE: 104 BPM | SYSTOLIC BLOOD PRESSURE: 95 MMHG | RESPIRATION RATE: 20 BRPM

## 2018-02-10 LAB
BASOPHILS # BLD AUTO: 0 TH/MM3 (ref 0–0.2)
BASOPHILS NFR BLD: 0.2 % (ref 0–2)
BUN SERPL-MCNC: 50 MG/DL (ref 7–18)
CALCIUM SERPL-MCNC: 7.4 MG/DL (ref 8.5–10.1)
CALCIUM TP COR SERPL-MCNC: 8.3 MG/DL (ref 8.5–10.1)
CHLORIDE SERPL-SCNC: 100 MEQ/L (ref 98–107)
CREAT SERPL-MCNC: 3.39 MG/DL (ref 0.6–1.3)
EOSINOPHIL # BLD: 0 TH/MM3 (ref 0–0.4)
EOSINOPHIL NFR BLD: 0.2 % (ref 0–4)
ERYTHROCYTE [DISTWIDTH] IN BLOOD BY AUTOMATED COUNT: 21 % (ref 11.6–17.2)
GFR SERPLBLD BASED ON 1.73 SQ M-ARVRAT: 18 ML/MIN (ref 89–?)
GLUCOSE SERPL-MCNC: 119 MG/DL (ref 74–106)
HCO3 BLD-SCNC: 33.6 MEQ/L (ref 21–32)
HCT VFR BLD CALC: 25.3 % (ref 39–51)
HCT VFR BLD CALC: 33.1 % (ref 39–51)
HGB BLD-MCNC: 11 GM/DL (ref 13–17)
HGB BLD-MCNC: 8.4 GM/DL (ref 13–17)
LYMPHOCYTES # BLD AUTO: 0.5 TH/MM3 (ref 1–4.8)
LYMPHOCYTES NFR BLD AUTO: 6.3 % (ref 9–44)
MAGNESIUM SERPL-MCNC: 2.2 MG/DL (ref 1.5–2.5)
MCH RBC QN AUTO: 31 PG (ref 27–34)
MCHC RBC AUTO-ENTMCNC: 33.2 % (ref 32–36)
MCV RBC AUTO: 93.3 FL (ref 80–100)
MONOCYTE #: 0.7 TH/MM3 (ref 0–0.9)
MONOCYTES NFR BLD: 9.4 % (ref 0–8)
NEUTROPHILS # BLD AUTO: 6.5 TH/MM3 (ref 1.8–7.7)
NEUTROPHILS NFR BLD AUTO: 83.9 % (ref 16–70)
PLATELET # BLD: 170 TH/MM3 (ref 150–450)
PMV BLD AUTO: 7.6 FL (ref 7–11)
PROT SERPL-MCNC: 5.4 GM/DL (ref 6.4–8.2)
RBC # BLD AUTO: 3.55 MIL/MM3 (ref 4.5–5.9)
SODIUM SERPL-SCNC: 140 MEQ/L (ref 136–145)
WBC # BLD AUTO: 7.8 TH/MM3 (ref 4–11)

## 2018-02-10 PROCEDURE — 0W3P8ZZ CONTROL BLEEDING IN GASTROINTESTINAL TRACT, VIA NATURAL OR ARTIFICIAL OPENING ENDOSCOPIC: ICD-10-PCS | Performed by: INTERNAL MEDICINE

## 2018-02-10 RX ADMIN — PANTOPRAZOLE SODIUM SCH MLS/HR: 40 INJECTION, POWDER, FOR SOLUTION INTRAVENOUS at 21:33

## 2018-02-10 RX ADMIN — CALCIUM CARBONATE (ANTACID) CHEW TAB 500 MG SCH MG: 500 CHEW TAB at 09:08

## 2018-02-10 RX ADMIN — HUMAN INSULIN SCH: 100 INJECTION, SOLUTION SUBCUTANEOUS at 12:00

## 2018-02-10 RX ADMIN — HUMAN INSULIN SCH: 100 INJECTION, SOLUTION SUBCUTANEOUS at 21:00

## 2018-02-10 RX ADMIN — MIDODRINE HYDROCHLORIDE SCH MG: 5 TABLET ORAL at 09:08

## 2018-02-10 RX ADMIN — MIDODRINE HYDROCHLORIDE SCH MG: 5 TABLET ORAL at 12:59

## 2018-02-10 RX ADMIN — Medication SCH ML: at 09:00

## 2018-02-10 RX ADMIN — STANDARDIZED SENNA CONCENTRATE AND DOCUSATE SODIUM SCH TAB: 8.6; 5 TABLET, FILM COATED ORAL at 21:00

## 2018-02-10 RX ADMIN — ALBUMIN HUMAN SCH MLS/HR: 0.25 SOLUTION INTRAVENOUS at 15:56

## 2018-02-10 RX ADMIN — CALCIUM CARBONATE (ANTACID) CHEW TAB 500 MG SCH MG: 500 CHEW TAB at 21:32

## 2018-02-10 RX ADMIN — HUMAN INSULIN SCH: 100 INJECTION, SOLUTION SUBCUTANEOUS at 16:52

## 2018-02-10 RX ADMIN — PANTOPRAZOLE SODIUM SCH MLS/HR: 40 INJECTION, POWDER, FOR SOLUTION INTRAVENOUS at 12:42

## 2018-02-10 RX ADMIN — Medication SCH ML: at 21:33

## 2018-02-10 RX ADMIN — PANTOPRAZOLE SCH MG: 40 TABLET, DELAYED RELEASE ORAL at 09:09

## 2018-02-10 RX ADMIN — FLUCONAZOLE SCH MG: 200 TABLET ORAL at 09:09

## 2018-02-10 RX ADMIN — ALBUMIN HUMAN SCH MLS/HR: 0.25 SOLUTION INTRAVENOUS at 03:00

## 2018-02-10 RX ADMIN — MIDODRINE HYDROCHLORIDE SCH MG: 5 TABLET ORAL at 21:32

## 2018-02-10 RX ADMIN — CALCITRIOL SCH MCG: 0.25 CAPSULE, GELATIN COATED ORAL at 09:08

## 2018-02-10 RX ADMIN — STANDARDIZED SENNA CONCENTRATE AND DOCUSATE SODIUM SCH TAB: 8.6; 5 TABLET, FILM COATED ORAL at 09:00

## 2018-02-10 RX ADMIN — HUMAN INSULIN SCH: 100 INJECTION, SOLUTION SUBCUTANEOUS at 09:10

## 2018-02-10 NOTE — HHI.NPPN
Subjective


General Problems:  Anemia


Renal Failure:  End Stage Renal Disease


History of Present Illness


Patient is a very pleasant 67 year old male who came into the emergency room on 

01/26/18 hypotensive, lethargic, and with a hemoglobin of 6.7.  He was 

transfused 2 units of PRBC's with a recheck pending.  He was initially put on 

Levophed for blood pressure support however it is currently not running. Has a 

past medical history of End -stage renal disease with peritoneal dialysis, AFIB

, diabetes, arthritis, chronic pain, and hx of strokes.    Onset of acute 

epigastric pain and discomfort noted for 5 days ago, but worsened over the past 

24 hours.  Reported loose stools for 3 weeks.  In the emergency room patient 

did report melena, nausea, mild vomiting undigested food yesterday, but denied 

any coffee ground emesis. Patient has AV fistula and will have dialysis while 

here.  Per patient PD catheter needs to be removed secondary to infection.


Additional Remarks


Patient is lethargic.  Has active GI bleed with drop in HGB plans for EGD 

today.  Has been transfused 2 units of PRBC


 (Gellermann,Diane M. ARNP)





Review of Systems


General


Constitutional:  Fatigue


General Remarks


weakness


 (Gellermann,Diane M. ARNP)





Respiratory


Respiratory Remarks


Denies SOB


 (Gellermann,Diane M. ARNP)





Cardiovascular


Cardiac Remarks


Denies CP


 (Gellermann,Diane M. ARNP)





Gastrointestinal


Gastrointestinal:  Blood/Tarry Stools


 (Gellermann,Diane M. ARNP)





Objective Data


Data





Vital Signs








  Date Time  Temp Pulse Resp B/P (MAP) Pulse Ox O2 Delivery O2 Flow Rate FiO2


 


2/10/18 08:00 97.9 112 18 99/62 (74) 91   


 


2/10/18 06:57 97.8 104 20 95/61 93   


 


2/10/18 06:20 98.0 110 20 98/55 95   


 


2/10/18 05:41 98.3 110 20 133/54 96   


 


2/10/18 04:34     95   


 


2/10/18 04:00      Room Air  


 


2/10/18 04:00 97.5 62 17 104/67 (79) 98   


 


2/10/18 02:45 98.1 110 20 96/54 95   


 


2/10/18 02:30 98.0 112 20 98/55 95   


 


2/10/18 00:00  106      


 


2/10/18 00:00 97.3 118 17 90/55 (67) 98   


 


2/10/18 00:00      Room Air  


 


2/9/18 20:00     96 Room Air  


 


2/9/18 20:00 97.9 110 17 93/55 (68) 97   


 


2/9/18 20:00  146      


 


2/9/18 17:33     94   21


 


2/9/18 16:00      Room Air  


 


2/9/18 12:00      Room Air  


 


2/9/18 11:42 97.6 105 14 88/53 (65) 94   


 


2/9/18 11:33  120      








 (Gellermann,Diane M. ARNP)


-:  


2/9/18 2340                                                                    

            2/9/18 0631








 Microbiology


2/9/18 Stool Occult Blood (CONOR) - Final, Complete


         HEMOCCULT POSITIVE


Imaging





Last Impressions








Chest X-Ray 1/27/18 0000 Signed





Impressions: 





 Service Date/Time:  Saturday, January 27, 2018 03:15 - CONCLUSION:  Patchy 





 consolidation or atelectasis of the left lower lung.     Chucky Morton MD 








 (Gellermann,Diane M. ARNP)





Physical Exam


General


Appearance:  Malnourished


 (Gellermann,Diane M. ARNP)





Neck


Neck Exam:  Neck Supple


 (Gellermann,Diane M. ARNP)





Pulmonary


Resp Exam:  Clear Bilaterally, No Distress


 (Gellermann,Diane M. ARNP)





Cardiology


CV Exam:  Regular


 (Gellermann,Diane M. ARNP)





Gastrointestinal/Abdomen


GI Exam:  Soft, Non-Tender


 (Gellermann,Diane M. ARNP)





Genitourinary


 Exam:  Flank Non-Tender


 (Gellermann,Diane M. ARNP)





Extremeties


Extremities Exam:  No Edema


 (Gellermann,Diane M. ARNP)





Assessment/Plan


Problem List:  


(1) ESRD (end stage renal disease) on dialysis


ICD Codes:  N18.6 - End stage renal disease; Z99.2 - Dependence on renal 

dialysis


Plan:  Dialysis MWF


Continue on midodrine 10 mg TID 


Active GI bleed with drop in HGB 10.0 to 8.4 transfused 2 units of PRBC's and 

plan for scope today.  Received one dose of plavix last night


Labs pending for this morning








(2) Peritonitis associated with peritoneal dialysis


ICD Codes:  T85.71XA - Infection and inflammatory reaction due to peritoneal 

dialysis catheter, initial encounter


Plan:  Peritoneal fluid culture positive for Candida. 


On fluconazole with stop date of the 13th


PD catheter removed


ID and surgery following. 





(3) Hypotension


ICD Codes:  I95.9 - Hypotension, unspecified


Status:  Acute


Plan:  Sepsis present on admission. 


Continue supportive care. 





(4) GI bleed


ICD Codes:  K92.2 - Gastrointestinal hemorrhage, unspecified


Status:  Acute


Plan:  s/p EGD: duodenal ulcer, gastritis, esophagitis. 


Active GI with drop in HGB plan for EGD today








(5) Anemia


ICD Codes:  D64.9 - Anemia, unspecified


Plan:  HGB  8.4 Epogen 10,000 units with each dialysis


Transfused 2 units of PRBC today


Plan for EGD today


 (Gellermann,Diane M. ARNP)


Problem List:  


(1) ESRD (end stage renal disease) on dialysis


ICD Codes:  N18.6 - End stage renal disease; Z99.2 - Dependence on renal 

dialysis


Plan:  Dialysis MWF


Continue on midodrine 10 mg TID 


Active GI bleed with drop in HGB 10.0 to 8.4 transfused 2 units of PRBC's and 

plan for scope today.  Received one dose of plavix last night


Patient seen and examined, agree with above.


Seen by GI, for EGD, possibly today.


Follow Hgb and transfuse as needed.





(2) Peritonitis associated with peritoneal dialysis


ICD Codes:  T85.71XA - Infection and inflammatory reaction due to peritoneal 

dialysis catheter, initial encounter


Plan:  Peritoneal fluid culture positive for Candida. 


On fluconazole with stop date of the 13th


PD catheter removed


ID and surgery following. 





(3) Hypotension


ICD Codes:  I95.9 - Hypotension, unspecified


Status:  Acute


Plan:  Sepsis present on admission. 


Continue supportive care. 





(4) GI bleed


ICD Codes:  K92.2 - Gastrointestinal hemorrhage, unspecified


Status:  Acute


Plan:  s/p EGD: duodenal ulcer, gastritis, esophagitis. 


Active GI with drop in HGB plan for EGD today








(5) Anemia


ICD Codes:  D64.9 - Anemia, unspecified


Plan:  HGB  8.4 Epogen 10,000 units with each dialysis


Transfused 2 units of PRBC today


Plan for EGD today


 (Carlita Hoang MD)





Problem Qualifiers





(1) Hypotension:  


Qualified Codes:  I95.9 - Hypotension, unspecified


(2) GI bleed:  


Qualified Codes:  K92.2 - Gastrointestinal hemorrhage, unspecified


(3) Anemia:  








Gellermann,Diane M. ARNP Feb 10, 2018 11:23


Carlita Hoang MD Feb 10, 2018 14:51

## 2018-02-10 NOTE — HHI.PR
Subjective


Remarks


Follow-up GI bleed.  Patient had maroon colored stools overnight denies 

abdominal pain and dizziness.  He was restarted on Plavix yesterday and has 

been receiving Lovenox for DVT prophylaxis.  Discussed with nursing





Objective


Vitals





Vital Signs








  Date Time  Temp Pulse Resp B/P (MAP) Pulse Ox O2 Delivery O2 Flow Rate FiO2


 


2/10/18 12:00  77      


 


2/10/18 08:00  103      


 


2/10/18 08:00 97.9 112 18 99/62 (74) 91   


 


2/10/18 07:00      Room Air  


 


2/10/18 06:57 97.8 104 20 95/61 93   


 


2/10/18 06:20 98.0 110 20 98/55 95   


 


2/10/18 05:41 98.3 110 20 133/54 96   


 


2/10/18 04:34     95   


 


2/10/18 04:00      Room Air  


 


2/10/18 04:00 97.5 62 17 104/67 (79) 98   


 


2/10/18 02:45 98.1 110 20 96/54 95   


 


2/10/18 02:30 98.0 112 20 98/55 95   


 


2/10/18 00:00  106      


 


2/10/18 00:00 97.3 118 17 90/55 (67) 98   


 


2/10/18 00:00      Room Air  


 


2/9/18 20:00     96 Room Air  


 


2/9/18 20:00 97.9 110 17 93/55 (68) 97   


 


2/9/18 20:00  146      


 


2/9/18 17:33     94   21


 


2/9/18 16:00      Room Air  














I/O      


 


 2/9/18 2/9/18 2/9/18 2/10/18 2/10/18 2/10/18





 07:00 15:00 23:00 07:00 15:00 23:00


 


Intake Total 360 ml   510 ml  


 


Output Total   1000 ml   


 


Balance 360 ml  -1000 ml 510 ml  


 


      


 


Intake Oral 360 ml   0 ml  


 


IV Total    50 ml  


 


Packed Cells    400 ml  


 


Blood Product IV Normal Saline Flush    60 ml  


 


Hemodialysis   1000 ml   


 


# Voids 0   2  


 


# Bowel Movements 1   2  








Result Diagram:  


2/10/18 1350                                                                   

             2/9/18 0631





Imaging





Last Impressions








Chest X-Ray 1/27/18 0000 Signed





Impressions: 





 Service Date/Time:  Saturday, January 27, 2018 03:15 - CONCLUSION:  Patchy 





 consolidation or atelectasis of the left lower lung.     Chucky Morton MD 








Objective Remarks


GENERAL: 67 year  male sitting up in bed, appears comfortable


EYES:  EOMI


ENT: No nasal bleeding or discharge.  Mucous membranes pink and dry..


NECK: Trachea midline.  Supple.


CARDIOVASCULAR: Heart rate in the 90's  Irregularly irregular consistent with 

A. fib.


RESPIRATORY:  Clear to auscultation. Breath sounds equal bilaterally.  

Decreased respiratory effort.


GASTROINTESTINAL: Abdomen soft, nondistended. incision healing well, no signs 

of infection/no drainage.  


MUSCULOSKELETAL: Patient has a well-healed rt heel ulcer.  Venous stasis 

changes noted to his bilateral lower extremities.


NEUROLOGICAL: Awake and weak. No obvious cranial nerve deficits.  Moves 

bilateral upper extremities.


BACK: Stage I/early decubitus in his sacral area


Procedures


EGD and removal of PD catheter


Date of Insertion:  Jan 25, 2018


Line:  Central Venous Catheter


Side:  Right


Location:  Internal, Jugular





A/P


Problem List:  


(1) GI bleed


ICD Code:  K92.2 - Gastrointestinal hemorrhage, unspecified


Status:  Acute


Assessment and Plan


Upper GI bleed secondary to duodenal ulcer.  Status post EGD which showed 2 

large duodenal ulcers and grade D esophagitis and gastritis. Patient had maroon 

colored stools overnight, guaiac positive and drop his hemoglobin to 8.9.  We 

consulted GI for repeat endoscopy today.  Continue PPI.  Avoid NSAIDs.  Plavix 

and heparin on hold


History of essential hypertension.  Patient has been having low BP on 

ProAmatine.  Cortisol of 20.  Low albumin will continue IV albumin


End-stage renal disease on hemodialysis. Monitor renal function, I/O's, avoid 

nephrotoxins.


History of CVA 1986/2007.  Stable restart Plavix when okay with GI


Atrial fibrillation rate controlled.  Intermittent RVR.  Patient is 

asymptomatic.  Unable to start rate limiting agents secondary to borderline BP.

  Not a candidate for digoxin therapy secondary to end-stage renal disease.  

Plavix as mentioned


Macrocytic anemia.  Required transfusion of 2 units.  Currently stable


Diabetes mellitus.  Stable continue fingersticks monitoring of home meds


Elevated TSH - repeat in 3-6 weeks.  Free T4 normal.  Free T3 low.  Would not 

initiate any levothyroxine at this time in this acute clinical setting


Infected peritoneal dialysis catheter/peritonitis fungal.  Status post removal 

of PD catheter.  Continue Diflucan until February 13


Right heel and Sacral decubitus ulcer.  Wound care


Depression.  Stable continue to hold Paxil


Chronic Pain syndrome.  Stable


Prophylaxis: Pantoprazole


Discharge Planning


Needs rehab but unable to afford co-pay





Problem Qualifiers





(1) GI bleed:  


Qualified Codes:  K92.2 - Gastrointestinal hemorrhage, unspecified








Phil Arredondo MD Feb 10, 2018 15:34

## 2018-02-10 NOTE — PD.CONS
HPI


History of Present Illness


This is a 67 year old M who has been previously evaluated by our service for 

melena. S/P EGD on  which revealed severe grade D esophagitis, gastritis

, and 2 large duodenal ulcers almost taking over the duodenal bulb and first 

potion.  Pathology --> Gastric mucosa without significant histopathologic 

abnormality.  (small intestine) Fibrinopurulent exudate and inflamed 

granulation tissue, consistent with ulcer base.  We recommended pt to continue 

Protonix, better control of diabetes, and avoid NSAIDs.  We were asked to 

reevaluate the pt yesterday and determine if anticoagulation could be 

restarted.  Pt was restarted on Plavix, received on dose last night.  We have 

been reconsulted because pt is having continued rectal bleeding.  Spoke with RN 

Anjali, who reports pt is now having maroon colored stool, she is unsure how 

many episodes over the night.  H/H dropped from 10/29.4 yesterday morning to 8.4

/25.3 last night.  He has now received 2 U PRBCs.  


 (Ernestina Gallo)





PFSH


Past Medical History


End-stage renal disease with peritoneal dialysis


Atrial fibrillation


Hypertension


Diabetes


Renal failure with left AV fistula


Arthritis


Chronic pain


CVAs : , 


recent infection to peritoneal dialysis catheter per pt


.


Past Surgical History


Appendectomy


Cataracts removed with lens implants


Right peritoneal double-lumen catheter placement 2016


Left hip replacement





.


 (Ernestina Gallo)


Coded Allergies:  


     morphine (Unverified  Allergy, Severe, anaphylactic, 18)


Family History


Mother had arthritis, father  of old age at age 84, sister living in good 

health


Social History


Patient does have wife


No current tobacco or alcohol


 (Ernestina Gallo)





Review of Systems


Gastrointestinal:  COMPLAINS OF: Bloody stools, Diarrhea, DENIES: Abdominal pain

, Black stools, Constipation, Nausea, Vomiting, Swelling of Abdomen (Ernestina Gallo)





GI Exam


Vitals I&O





Vital Signs








  Date Time  Temp Pulse Resp B/P (MAP) Pulse Ox O2 Delivery O2 Flow Rate FiO2


 


2/10/18 08:00 97.9 112 18 99/62 (74) 91   


 


2/10/18 06:57 97.8 104 20 95/61 93   


 


2/10/18 06:20 98.0 110 20 98/55 95   


 


2/10/18 05:41 98.3 110 20 133/54 96   


 


2/10/18 04:34     95   


 


2/10/18 04:00      Room Air  


 


2/10/18 04:00 97.5 62 17 104/67 (79) 98   


 


2/10/18 02:45 98.1 110 20 96/54 95   


 


2/10/18 02:30 98.0 112 20 98/55 95   


 


2/10/18 00:00  106      


 


2/10/18 00:00 97.3 118 17 90/55 (67) 98   


 


2/10/18 00:00      Room Air  


 


18 20:00     96 Room Air  


 


18 20:00 97.9 110 17 93/55 (68) 97   


 


18 20:00  146      


 


18 17:33     94   21


 


18 16:00      Room Air  


 


18 12:00      Room Air  


 


18 11:42 97.6 105 14 88/53 (65) 94   


 


18 11:33  120      














I/O      


 


 2/9/18 2/9/18 2/9/18 2/10/18 2/10/18 2/10/18





 07:00 15:00 23:00 07:00 15:00 23:00


 


Intake Total 360 ml   510 ml  


 


Output Total   1000 ml   


 


Balance 360 ml  -1000 ml 510 ml  


 


      


 


Intake Oral 360 ml   0 ml  


 


IV Total    50 ml  


 


Packed Cells    400 ml  


 


Blood Product IV Normal Saline Flush    60 ml  


 


Hemodialysis   1000 ml   


 


# Voids 0   2  


 


# Bowel Movements 1   2  








Imaging





Last Impressions








Chest X-Ray 18 0000 Signed





Impressions: 





 Service Date/Time:  2018 03:15 - CONCLUSION:  Patchy 





 consolidation or atelectasis of the left lower lung.     Chucky Morton MD 








Laboratory











Test


  18


23:40


 


Hemoglobin 8.4 GM/DL 


 


Hematocrit 25.3 % 














 Date/Time


Source Procedure


Growth Status


 


 


 18 07:19


Blood Peripheral Aerobic Blood Culture - Final


NO GROWTH IN 5 DAYS Complete


 


 18 07:19


Blood Peripheral Anaerobic Blood Culture - Final


NO GROWTH IN 5 DAYS Complete





 18 15:20


Fluid Peritoneal Fluid Fungal Smear - Final


NO FUNGAL ELEMENTS SEEN. Resulted


 


 18 15:20 Fungal Culture - Preliminary


Candida Albicans Resulted


 


 18 22:00


Stool Stool Stool Occult Blood (CONOR) - Final


HEMOCCULT POSITIVE Complete








Physical Examination


HEENT:Normocephalic; atraumatic


CHEST:  Even/unlabored


CARDIAC:  Irregularly irregular 


ABDOMEN:  Soft, nondistended, nontender; bowel sounds active


EXTREMITIES: BLE


SKIN:  Normal; no rash; no jaundice.


CNS:  Awake


 (Ernestina Gallo)





Assessment and Plan


Assessment:  


(1) Anemia


ICD Codes:  D64.9 - Anemia, unspecified


(2) GI bleed


ICD Codes:  K92.2 - Gastrointestinal hemorrhage, unspecified


Status:  Acute


(3) History of renal failure


ICD Codes:  Z87.448 - Personal history of other diseases of urinary system


Status:  Acute


Plan


Assessment:


- History of GIB- previously following this patient to evaluate for anemia and 

melena. 


  S/P EGD --> Severe grade D esophagitis, severe gastritis, and 2 large 

duodenal 


  ulcers almost taking over the duodenal bulb and first potion.  Pathology --> 

Gastric


  mucosa without significant histopathologic abnormality.  (small intestine) 

Fibrinopurulent


  exudate and inflamed granulation tissue, consistent with ulcer base. We 

recommended pt to


  continue Protonix, better control of diabetes, and avoid NSAIDs.  GI signed 

off and 


  recommended pt follow up on outpatient basis for colonoscopy.  Dr. Arredondo has 


  asked us to evaluate pt and determine if anticoagulation can be restarted.  

Pt  


  denies any continue melena, nausea, vomiting.  H/H currently 10/29.4, last 

transfused


  2 U PRBCs on  with dialysis.  No obvious continuation of GIB, anemia 

likely


  secondary to renal insufficiency.  OK to restart anticoagulation for a GI 

standpoint. 





(2/10) Pt received one dose of Plavix last night.  Began with rectal bleeding 

over night.


  Spoke with RN, Anjali who states it is maroon in color.   She is unsure how 

many


  episodes of bleeding he has had over night.   H/H dropped from 10/29.4 

yesterday 


  morning to 8.4/25.3 late last night.  Has now received 2 U PRBCs.  





Plan:


EGD today


Obtain consent


Keep NPO


Protonix gtt


Monitor H/H


Further recommendations to follow based on results of above





Pt has been seen and examined by myself and Dr. Diaz and this note is 

written on his behalf 


 (Ernestina Gallo)


Plan


Patient was seen and examined, agree with above-noted, he will need: EGD, we'll 

plan on doing that on Monday


 (Dajuan Diaz MD)





Problem Qualifiers





(1) Anemia:  





(2) GI bleed:  


Qualified Codes:  K92.2 - Gastrointestinal hemorrhage, unspecified








Ernestina Gallo Feb 10, 2018 09:58


Dajuan Diza MD Feb 10, 2018 16:35

## 2018-02-10 NOTE — PD.PROCEDR
GI Procedure





PROCEDURE PERFORMED


EGD with cautery





INDICATION FOR PROCEDURE


GI bleed





PROCEDURE:


The procedure, risks and benefits were discussed with Mr. Vega and informed


consent was obtained.  Anesthesia sedated him with Diprivan.  He was placed in 

the left lateral decubitus position.





EGD:


The Pentax videoscope was introduced through the oropharynx and advanced to the 

second portion of the duodenum under direct visualization. Retroflexion was 

performed in the stomach.





FINDINGS:


The esophagus this appeared to be unremarkable


The stomach this too appeared to be unremarkable


The duodenum there were 2 large ulcerations in the duodenal sweep and the first 

portion of the duodenum the larger of the 2 ulcers was the one that seen the 

first portion of the duodenum there was a visible vessel noted this was 

cauterized successfully using the Aubree probe





ESTIMATED BLOOD LOSS:


Minimal blood loss as I cauterized





SPECIMENS REMOVED:


None





COMPLICATIONS:


None





IMPRESSION:


Duodenal ulcer with high risk stigmata of bleeding





PLAN:


Continue with current supportive care


Monitor labs and transfuse as needed


EGD in 2 months











Blake Welch MD Feb 10, 2018 21:03

## 2018-02-11 VITALS — HEART RATE: 85 BPM

## 2018-02-11 VITALS
DIASTOLIC BLOOD PRESSURE: 67 MMHG | RESPIRATION RATE: 18 BRPM | HEART RATE: 104 BPM | OXYGEN SATURATION: 100 % | TEMPERATURE: 97.4 F | SYSTOLIC BLOOD PRESSURE: 110 MMHG

## 2018-02-11 VITALS
RESPIRATION RATE: 18 BRPM | OXYGEN SATURATION: 94 % | HEART RATE: 111 BPM | SYSTOLIC BLOOD PRESSURE: 87 MMHG | TEMPERATURE: 97.5 F | DIASTOLIC BLOOD PRESSURE: 56 MMHG

## 2018-02-11 VITALS
SYSTOLIC BLOOD PRESSURE: 92 MMHG | HEART RATE: 102 BPM | RESPIRATION RATE: 18 BRPM | DIASTOLIC BLOOD PRESSURE: 60 MMHG | OXYGEN SATURATION: 94 % | TEMPERATURE: 97.4 F

## 2018-02-11 VITALS
TEMPERATURE: 97.1 F | DIASTOLIC BLOOD PRESSURE: 61 MMHG | HEART RATE: 103 BPM | RESPIRATION RATE: 20 BRPM | SYSTOLIC BLOOD PRESSURE: 94 MMHG | OXYGEN SATURATION: 97 %

## 2018-02-11 VITALS
HEART RATE: 123 BPM | DIASTOLIC BLOOD PRESSURE: 51 MMHG | RESPIRATION RATE: 20 BRPM | SYSTOLIC BLOOD PRESSURE: 96 MMHG | OXYGEN SATURATION: 95 % | TEMPERATURE: 97.2 F

## 2018-02-11 VITALS
TEMPERATURE: 97.3 F | RESPIRATION RATE: 20 BRPM | HEART RATE: 92 BPM | SYSTOLIC BLOOD PRESSURE: 105 MMHG | OXYGEN SATURATION: 96 % | DIASTOLIC BLOOD PRESSURE: 64 MMHG

## 2018-02-11 LAB
BASOPHILS # BLD AUTO: 0 TH/MM3 (ref 0–0.2)
BASOPHILS NFR BLD: 0.3 % (ref 0–2)
BUN SERPL-MCNC: 66 MG/DL (ref 7–18)
CALCIUM SERPL-MCNC: 7.8 MG/DL (ref 8.5–10.1)
CHLORIDE SERPL-SCNC: 100 MEQ/L (ref 98–107)
CREAT SERPL-MCNC: 4.14 MG/DL (ref 0.6–1.3)
EOSINOPHIL # BLD: 0.1 TH/MM3 (ref 0–0.4)
EOSINOPHIL NFR BLD: 1 % (ref 0–4)
ERYTHROCYTE [DISTWIDTH] IN BLOOD BY AUTOMATED COUNT: 20.7 % (ref 11.6–17.2)
GFR SERPLBLD BASED ON 1.73 SQ M-ARVRAT: 15 ML/MIN (ref 89–?)
GLUCOSE SERPL-MCNC: 203 MG/DL (ref 74–106)
HCO3 BLD-SCNC: 30.2 MEQ/L (ref 21–32)
HCT VFR BLD CALC: 30.9 % (ref 39–51)
HGB BLD-MCNC: 10.4 GM/DL (ref 13–17)
LYMPHOCYTES # BLD AUTO: 0.3 TH/MM3 (ref 1–4.8)
LYMPHOCYTES NFR BLD AUTO: 4.2 % (ref 9–44)
MAGNESIUM SERPL-MCNC: 2.3 MG/DL (ref 1.5–2.5)
MCH RBC QN AUTO: 31.8 PG (ref 27–34)
MCHC RBC AUTO-ENTMCNC: 33.8 % (ref 32–36)
MCV RBC AUTO: 94.1 FL (ref 80–100)
MONOCYTE #: 0.4 TH/MM3 (ref 0–0.9)
MONOCYTES NFR BLD: 5.3 % (ref 0–8)
NEUTROPHILS # BLD AUTO: 6.6 TH/MM3 (ref 1.8–7.7)
NEUTROPHILS NFR BLD AUTO: 89.2 % (ref 16–70)
PLATELET # BLD: 160 TH/MM3 (ref 150–450)
PMV BLD AUTO: 7.9 FL (ref 7–11)
RBC # BLD AUTO: 3.29 MIL/MM3 (ref 4.5–5.9)
SODIUM SERPL-SCNC: 140 MEQ/L (ref 136–145)
WBC # BLD AUTO: 7.4 TH/MM3 (ref 4–11)

## 2018-02-11 RX ADMIN — FLUCONAZOLE SCH MG: 200 TABLET ORAL at 08:05

## 2018-02-11 RX ADMIN — HUMAN INSULIN SCH: 100 INJECTION, SOLUTION SUBCUTANEOUS at 15:52

## 2018-02-11 RX ADMIN — HUMAN INSULIN SCH: 100 INJECTION, SOLUTION SUBCUTANEOUS at 11:39

## 2018-02-11 RX ADMIN — HUMAN INSULIN SCH: 100 INJECTION, SOLUTION SUBCUTANEOUS at 21:53

## 2018-02-11 RX ADMIN — CALCITRIOL SCH MCG: 0.25 CAPSULE, GELATIN COATED ORAL at 08:05

## 2018-02-11 RX ADMIN — PANTOPRAZOLE SODIUM SCH MLS/HR: 40 INJECTION, POWDER, FOR SOLUTION INTRAVENOUS at 07:00

## 2018-02-11 RX ADMIN — Medication SCH ML: at 07:09

## 2018-02-11 RX ADMIN — STANDARDIZED SENNA CONCENTRATE AND DOCUSATE SODIUM SCH TAB: 8.6; 5 TABLET, FILM COATED ORAL at 08:05

## 2018-02-11 RX ADMIN — CALCIUM CARBONATE (ANTACID) CHEW TAB 500 MG SCH MG: 500 CHEW TAB at 08:05

## 2018-02-11 RX ADMIN — MIDODRINE HYDROCHLORIDE SCH MG: 5 TABLET ORAL at 15:50

## 2018-02-11 RX ADMIN — HUMAN INSULIN SCH: 100 INJECTION, SOLUTION SUBCUTANEOUS at 08:00

## 2018-02-11 RX ADMIN — Medication SCH ML: at 21:53

## 2018-02-11 RX ADMIN — PANTOPRAZOLE SODIUM SCH MLS/HR: 40 INJECTION, POWDER, FOR SOLUTION INTRAVENOUS at 13:24

## 2018-02-11 RX ADMIN — STANDARDIZED SENNA CONCENTRATE AND DOCUSATE SODIUM SCH TAB: 8.6; 5 TABLET, FILM COATED ORAL at 21:00

## 2018-02-11 RX ADMIN — CALCIUM CARBONATE (ANTACID) CHEW TAB 500 MG SCH MG: 500 CHEW TAB at 21:53

## 2018-02-11 RX ADMIN — MIDODRINE HYDROCHLORIDE SCH MG: 5 TABLET ORAL at 11:29

## 2018-02-11 RX ADMIN — MIDODRINE HYDROCHLORIDE SCH MG: 5 TABLET ORAL at 06:37

## 2018-02-11 RX ADMIN — ALBUMIN HUMAN SCH MLS/HR: 0.25 SOLUTION INTRAVENOUS at 03:11

## 2018-02-11 NOTE — HHI.NPPN
Subjective


General Problems:  Anemia


Renal Failure:  End Stage Renal Disease


History of Present Illness


Patient is a very pleasant 67 year old male who came into the emergency room on 

01/26/18 hypotensive, lethargic, and with a hemoglobin of 6.7.  He was 

transfused 2 units of PRBC's with a recheck pending.  He was initially put on 

Levophed for blood pressure support however it is currently not running. Has a 

past medical history of End -stage renal disease with peritoneal dialysis, AFIB

, diabetes, arthritis, chronic pain, and hx of strokes.    Onset of acute 

epigastric pain and discomfort noted for 5 days ago, but worsened over the past 

24 hours.  Reported loose stools for 3 weeks.  In the emergency room patient 

did report melena, nausea, mild vomiting undigested food yesterday, but denied 

any coffee ground emesis. Patient has AV fistula and will have dialysis while 

here.  Per patient PD catheter needs to be removed secondary to infection.


Additional Remarks


Patient is more alert today.  EGD yesterday with Duodenal ulcer with high risk 

stigmata of bleeding.  Has cough.  Denies any SOB





 (Gellermann,Diane M. ARNP)





Review of Systems


General


Constitutional:  Fatigue


General Remarks


weakness


 (Gellermann,Diane M. ARNP)





Respiratory


Lungs:  Cough


Respiratory Remarks


Denies SOB


 (Gellermann,Diane M. ARNP)





Cardiovascular


Cardiac Remarks


Denies CP


 (Gellermann,Diane M. ARNP)





Gastrointestinal


Gastrointestinal:  Blood/Tarry Stools


 (Gellermann,Diane M. ARNP)





Objective Data


Data





Vital Signs








  Date Time  Temp Pulse Resp B/P (MAP) Pulse Ox O2 Delivery O2 Flow Rate FiO2


 


2/11/18 07:12      Nasal Cannula 3.00 


 


2/11/18 04:00 97.4 102 18 92/60 (71) 94   


 


2/11/18 04:00  107      


 


2/11/18 00:00  111      


 


2/11/18 00:00 97.5 103 18 87/56 (66) 94   


 


2/11/18 00:00      Nasal Cannula 3.00 


 


2/10/18 20:30      Nasal Cannula 3.00 


 


2/10/18 20:00 97.9 99 12 95/62 (73) 93 Nasal Cannula 3 


 


2/10/18 20:00 97.5 96 18 101/68 (79) 94   


 


2/10/18 19:48  92 12 88/55 (66) 95 Nasal Cannula 3 


 


2/10/18 19:45  88 12 86/53 (64) 95 Nasal Cannula 3 


 


2/10/18 19:41 97.8 86 12 91/61 (71) 94 Nasal Cannula 3 


 


2/10/18 16:00  93      


 


2/10/18 16:00 98.2 87 18 93/55 (68) 93   


 


2/10/18 12:00  77      


 


2/10/18 12:00 97.3 95 18 102/57 (72) 92   








 (Gellermann,Diane M. ARNP)


-:  


2/10/18 1350                                                                   

             2/10/18 1358








Physical Exam


General


Appearance:  No Acute Distress, Malnourished


 (Gellermann,Diane M. ARNP)





Neck


Neck Exam:  Neck Supple


 (Gellermann,Diane M. ARNP)





Pulmonary


Resp Exam:  Clear Bilaterally, No Distress, Decreased Bases


 (Gellermann,Diane M. ARNP)





Cardiology


CV Exam:  Regular


 (Gellermann,Diane M. ARNP)





Gastrointestinal/Abdomen


GI Exam:  Soft, Non-Tender


 (Gellermann,Diane M. ARNP)





Genitourinary


 Exam:  Flank Non-Tender


 (Gellermann,Diane M. ARNP)





Integumentary


Skin Exam:  Warm, Dry, Ulcer(s)


 (Gellermann,Diane M. ARNP)





Extremeties


Extremities Exam:  No Edema


 (Gellermann,Diane M. ARNP)





Neurologic


Neuro Exam:  Alert


 (Gellermann,Diane M. ARNP)





Psychiatric


Psych Exam:  Appropriate Responses


 (Gellermann,Diane M. ARNP)





Assessment/Plan


Problem List:  


(1) ESRD (end stage renal disease) on dialysis


ICD Codes:  N18.6 - End stage renal disease; Z99.2 - Dependence on renal 

dialysis


Plan:  Dialysis MWF


Continue on midodrine 10 mg TID Map >65mmhg


ECHO pending


S/P EGD yesterday with duodenal ulcer with high risk stigmata of bleeding


Labs pending today


Dialysis tomorrow





(2) Peritonitis associated with peritoneal dialysis


ICD Codes:  T85.71XA - Infection and inflammatory reaction due to peritoneal 

dialysis catheter, initial encounter


Plan:  Peritoneal fluid culture positive for Candida. 


On fluconazole with stop date of the 13th


PD catheter removed


ID and surgery following. 





(3) Hypotension


ICD Codes:  I95.9 - Hypotension, unspecified


Status:  Acute


Plan:  Sepsis present on admission. 


Continue supportive care. 





(4) GI bleed


ICD Codes:  K92.2 - Gastrointestinal hemorrhage, unspecified


Status:  Acute


Plan:  s/p EGD: duodenal ulcer, gastritis, esophagitis. 


S/P on 2/10 Duodenal ulcer with high risk stigmata of bleeding











(5) Anemia


ICD Codes:  D64.9 - Anemia, unspecified


Plan:  HGB  10.4 yesterday


 Epogen 10,000 units with each dialysis


Transfused 2 units of PRBC yesterday


 (Gellermann,Diane M. ARNP)


Problem List:  


(1) ESRD (end stage renal disease) on dialysis


ICD Codes:  N18.6 - End stage renal disease; Z99.2 - Dependence on renal 

dialysis


Plan:  Dialysis MWF


Continue on midodrine 10 mg TID Map >65mmhg


ECHO pending


S/P EGD yesterday with duodenal ulcer with high risk stigmata of bleeding


Labs pending today


Dialysis tomorrow.


Patient seen and examined, agree with above.





(2) Peritonitis associated with peritoneal dialysis


ICD Codes:  T85.71XA - Infection and inflammatory reaction due to peritoneal 

dialysis catheter, initial encounter


Plan:  Peritoneal fluid culture positive for Candida. 


On fluconazole with stop date of the 13th


PD catheter removed


ID and surgery following. 





(3) Hypotension


ICD Codes:  I95.9 - Hypotension, unspecified


Status:  Acute


Plan:  Sepsis present on admission. 


Continue supportive care. 





(4) GI bleed


ICD Codes:  K92.2 - Gastrointestinal hemorrhage, unspecified


Status:  Acute


Plan:  s/p EGD: duodenal ulcer, gastritis, esophagitis. 


S/P on 2/10 Duodenal ulcer with high risk stigmata of bleeding











(5) Anemia


ICD Codes:  D64.9 - Anemia, unspecified


Plan:  HGB  10.4 yesterday


 Epogen 10,000 units with each dialysis


Transfused 2 units of PRBC yesterday


 (Carlita Hoang MD)





Problem Qualifiers





(1) Hypotension:  


Qualified Codes:  I95.9 - Hypotension, unspecified


(2) GI bleed:  


Qualified Codes:  K92.2 - Gastrointestinal hemorrhage, unspecified


(3) Anemia:  








Gellermann,Diane M. ARNP Feb 11, 2018 09:47


Carlita Hoang MD Feb 11, 2018 12:17

## 2018-02-11 NOTE — HHI.PR
Subjective


Remarks


Follow-up GI bleed and A. fib.  No further bleeding tolerated repeat EGD 

showing duodenal ulcer with visible vessel status post cautery.  Patient 

received 2 units packed RBC yesterday and RN reported abnormal breath sounds 

and patient received IV Lasix.  He denies shortness of breath.  Patient with 

intermittent RVR denies palpitations.  Discussed with nephrology, may 

digitalize patient but use half of the usual digoxin dose.





Objective


Vitals





Vital Signs








  Date Time  Temp Pulse Resp B/P (MAP) Pulse Ox O2 Delivery O2 Flow Rate FiO2


 


2/11/18 12:00  103      


 


2/11/18 08:00 97.2 107 20 96/51 (66) 95   


 


2/11/18 08:00  123      


 


2/11/18 07:12      Nasal Cannula 3.00 


 


2/11/18 04:00 97.4 102 18 92/60 (71) 94   


 


2/11/18 04:00  107      


 


2/11/18 00:00  111      


 


2/11/18 00:00 97.5 103 18 87/56 (66) 94   


 


2/11/18 00:00      Nasal Cannula 3.00 


 


2/10/18 20:30      Nasal Cannula 3.00 


 


2/10/18 20:00 97.9 99 12 95/62 (73) 93 Nasal Cannula 3 


 


2/10/18 20:00 97.5 96 18 101/68 (79) 94   


 


2/10/18 19:48  92 12 88/55 (66) 95 Nasal Cannula 3 


 


2/10/18 19:45  88 12 86/53 (64) 95 Nasal Cannula 3 


 


2/10/18 19:41 97.8 86 12 91/61 (71) 94 Nasal Cannula 3 


 


2/10/18 16:00  93      


 


2/10/18 16:00 98.2 87 18 93/55 (68) 93   














I/O      


 


 2/10/18 2/10/18 2/10/18 2/11/18 2/11/18 2/11/18





 07:00 15:00 23:00 07:00 15:00 23:00


 


Intake Total 510 ml 400 ml 110 ml 177 ml  


 


Balance 510 ml 400 ml 110 ml 177 ml  


 


      


 


Intake Oral 0 ml  60 ml 0 ml  


 


IV Total 50 ml   177 ml  


 


Packed Cells 400 ml 400 ml    


 


Blood Product IV Normal Saline Flush 60 ml     


 


Other   50 ml   


 


# Voids 2  1 0  


 


# Bowel Movements 2  2 2  








Result Diagram:  


2/10/18 1350                                                                   

             2/10/18 1358





Objective Remarks


GENERAL: 67 year  male sitting up in bed, appears comfortable


CARDIOVASCULAR: Heart rate in the 90's  Irregularly irregular consistent with 

A. fib.


RESPIRATORY: Decreased breath sounds equal bilaterally.  Decreased respiratory 

effort.


GASTROINTESTINAL: Abdomen soft, nondistended. incision healing well, no signs 

of infection/no drainage.  


MUSCULOSKELETAL: Patient has a well-healed rt heel ulcer.  Venous stasis 

changes noted to his bilateral lower extremities.


NEUROLOGICAL: Awake and weak. No obvious cranial nerve deficits.  Moves 

bilateral upper extremities.


BACK: Stage I/early decubitus in his sacral area


Procedures


EGD and removal of PD catheter


Date of Insertion:  Jan 25, 2018


Line:  Central Venous Catheter


Side:  Right


Location:  Internal, Jugular





A/P


Problem List:  


(1) GI bleed


ICD Code:  K92.2 - Gastrointestinal hemorrhage, unspecified


Status:  Acute


Assessment and Plan


Upper GI bleed secondary to duodenal ulcer.  Status post EGD which showed 2 

large duodenal ulcers and grade D esophagitis and gastritis. Patient had maroon 

colored stools guaiac positive 2/10 underwent repeat EGD with duodenal ulcer 

and visible vessel status post cautery.   Continue PPI.  Avoid NSAIDs.  Plavix 

and heparin on hold


History of essential hypertension.  Patient has been having low BP on 

ProAmatine.  Cortisol of 20.  Low albumin will continue IV albumin.  Improving


End-stage renal disease on hemodialysis. Monitor renal function, I/O's, avoid 

nephrotoxins.


History of CVA 1986/2007.  Stable restart Plavix when okay with GI


Atrial fibrillation rate controlled.  Intermittent RVR.  Patient is 

asymptomatic.  Unable to start rate limiting agents secondary to borderline BP.

  Discussed with nephrology may digitalize patient using half of the usual 

dose. Rate control may improve hypertension.  Plavix as mentioned


Macrocytic anemia secondary to acute blood loss.  Required transfusion of 4 

units.  Improved


Diabetes mellitus.  Stable continue fingersticks monitoring of home meds


Elevated TSH - repeat in 3-6 weeks.  Free T4 normal.  Free T3 low.  Would not 

initiate any levothyroxine at this time in this acute clinical setting


Infected peritoneal dialysis catheter/peritonitis fungal.  Status post removal 

of PD catheter.  Continue Diflucan until February 13


Right heel and Sacral decubitus ulcer.  Wound care


Depression.  Stable continue to hold Paxil


Chronic Pain syndrome.  Stable


Prophylaxis: Pantoprazole


Palliative care following, patient wants to continue aggressive care


Discharge Planning


Needs rehab but unable to afford co-pay





Problem Qualifiers





(1) GI bleed:  


Qualified Codes:  K92.2 - Gastrointestinal hemorrhage, unspecified








Phil Arredondo MD Feb 11, 2018 14:28

## 2018-02-11 NOTE — HHI.GIFU
Subjective


Remarks


Pt resting in bed, wife at bedside.  He reports one small BM today, denies any 

blood.  No GI complaints at this time. 


 (Ernestina Gallo)





Objective


Vitals I&O





Vital Signs








  Date Time  Temp Pulse Resp B/P (MAP) Pulse Ox O2 Delivery O2 Flow Rate FiO2


 


2/11/18 12:00  103      


 


2/11/18 08:00 97.2 107 20 96/51 (66) 95   


 


2/11/18 08:00  123      


 


2/11/18 07:12      Nasal Cannula 3.00 


 


2/11/18 04:00 97.4 102 18 92/60 (71) 94   


 


2/11/18 04:00  107      


 


2/11/18 00:00  111      


 


2/11/18 00:00 97.5 103 18 87/56 (66) 94   


 


2/11/18 00:00      Nasal Cannula 3.00 


 


2/10/18 20:30      Nasal Cannula 3.00 


 


2/10/18 20:00 97.9 99 12 95/62 (73) 93 Nasal Cannula 3 


 


2/10/18 20:00 97.5 96 18 101/68 (79) 94   


 


2/10/18 19:48  92 12 88/55 (66) 95 Nasal Cannula 3 


 


2/10/18 19:45  88 12 86/53 (64) 95 Nasal Cannula 3 


 


2/10/18 19:41 97.8 86 12 91/61 (71) 94 Nasal Cannula 3 


 


2/10/18 16:00  93      


 


2/10/18 16:00 98.2 87 18 93/55 (68) 93   














I/O      


 


 2/10/18 2/10/18 2/10/18 2/11/18 2/11/18 2/11/18





 07:00 15:00 23:00 07:00 15:00 23:00


 


Intake Total 510 ml 400 ml 110 ml 177 ml  


 


Balance 510 ml 400 ml 110 ml 177 ml  


 


      


 


Intake Oral 0 ml  60 ml 0 ml  


 


IV Total 50 ml   177 ml  


 


Packed Cells 400 ml 400 ml    


 


Blood Product IV Normal Saline Flush 60 ml     


 


Other   50 ml   


 


# Voids 2  1 0  


 


# Bowel Movements 2  2 2  








Laboratory











 Date/Time


Source Procedure


Growth Status


 


 


 1/26/18 07:19


Blood Peripheral Aerobic Blood Culture - Final


NO GROWTH IN 5 DAYS Complete


 


 1/26/18 07:19


Blood Peripheral Anaerobic Blood Culture - Final


NO GROWTH IN 5 DAYS Complete





 1/26/18 15:20


Fluid Peritoneal Fluid Fungal Smear - Final


NO FUNGAL ELEMENTS SEEN. Resulted


 


 1/26/18 15:20 Fungal Culture - Preliminary


Candida Albicans Resulted


 


 2/9/18 22:00


Stool Stool Stool Occult Blood (CONOR) - Final


HEMOCCULT POSITIVE Complete








Imaging





Last Impressions








Chest X-Ray 1/27/18 0000 Signed





Impressions: 





 Service Date/Time:  Saturday, January 27, 2018 03:15 - CONCLUSION:  Patchy 





 consolidation or atelectasis of the left lower lung.     Chucky Morton MD 








Physical Exam


HEENT: Normocephalic; atraumatic


CHEST:  Even/unlabored , 


CARDIAC:  RRR 


ABDOMEN:  Irregularly irregular 


EXTREMITIES: No clubbing, cyanosis, or edema.


CNS: Alert and oriented x 3


 (Ernestina GalloP)





Assessment and Plan


Assessment:  


(1) Anemia


ICD Codes:  D64.9 - Anemia, unspecified


(2) GI bleed


ICD Codes:  K92.2 - Gastrointestinal hemorrhage, unspecified


Status:  Acute


(3) History of renal failure


ICD Codes:  Z87.448 - Personal history of other diseases of urinary system


Status:  Acute


Plan


- History of GIB- previously following this patient to evaluate for anemia and 

melena. 


  S/P EGD --> Severe grade D esophagitis, severe gastritis, and 2 large 

duodenal 


  ulcers almost taking over the duodenal bulb and first potion.  Pathology --> 

Gastric


  mucosa without significant histopathologic abnormality.  (small intestine) 

Fibrinopurulent


  exudate and inflamed granulation tissue, consistent with ulcer base. We 

recommended pt to


  continue Protonix, better control of diabetes, and avoid NSAIDs.  GI signed 

off and 


  recommended pt follow up on outpatient basis for colonoscopy.  Dr. Arredondo has 


  asked us to evaluate pt and determine if anticoagulation can be restarted.  

Pt  


  denies any continue melena, nausea, vomiting.  H/H currently 10/29.4, last 

transfused


  2 U PRBCs on Feb 7th with dialysis.  No obvious continuation of GIB, anemia 

likely


  secondary to renal insufficiency.  OK to restart anticoagulation for a GI 

standpoint. 





(2/10) Pt received one dose of Plavix last night.  Began with rectal bleeding 

over night.


  Spoke with RN, Anjali who states it is maroon in color.   She is unsure how 

many


  episodes of bleeding he has had over night.   H/H dropped from 10/29.4 

yesterday 


  morning to 8.4/25.3 late last night.  Has now received 2 U PRBCs.  





(2/11) S/P EGD yesterday --> Duodenal ulcer with high risk stigmata of bleeding.


  Pt reports one BM today, states possibly old blood, unsure.  Received 2 U 

PRBCs


  yesterday. H/H currently 11/33.1.  Protonix gtt DCd now on Protonix BID.  





Plan:


HENRIK


Protonix


Monitor CBC closely


Notify GI of active bleeding


Recommend repeat EGD in 2 months


Further recommendations to follow based on results of above





Pt has been seen and examined by myself and Dr. Welch and this note is 

written on his behalf 


 (Ernestina Gallo)


Physician Comments


patient seen and examined


agree with above


monitor labs


continue present supportive care


avoid NSAIDs and ASA or anticoagulation for at least a month


f/u with GI post D/C


 (Blake Welch MD)





Problem Qualifiers





(1) Anemia:  





(2) GI bleed:  


Qualified Codes:  K92.2 - Gastrointestinal hemorrhage, unspecified








Ernestina Gallo Feb 11, 2018 14:50


Blake Welch MD Feb 11, 2018 21:54

## 2018-02-11 NOTE — ECHRPT
Indication:   CARDIOMYOPATHY

 

 CONCLUSIONS

 The left ventricular systolic function is normal with an estimated ejection fraction in the range of
 55-60%. 

 Normal left ventricular size. 

 Wall thickness is normal. 

 No regional wall motion abnormalities are present. 

 The right ventricle is mildly dilated. 

 The left atrial size is mild-to-moderately dilated. 

 The right atrial size is moderately dilated. 

 Mild mitral valve regurgitation. 

 Diffuse calcification of the aortic valve. 

 There is mild to moderate tricuspid valve regurgitation. 

 The estimated pulmonary arterial pressure is 47.7 mmHg. 

 There is trace pericardial effusion present. 

 

 BP:  95    / 61      HR: 79                       Rhythm:

 

 MEASUREMENTS  (Male / Female) Normal Values       Technical Quality:

 2D ECHO

 LV Diastolic Diameter PLAX        3.5 cm                4.2 - 5.9 / 3.9 - 5.3 cm

 LV Systolic Diameter PLAX         2.6 cm                

 IVS Diastolic Thickness           1.2 cm                0.6 - 1.0 / 0.6 - 0.9 cm

 LVPW Diastolic Thickness          1.2 cm                0.6 - 1.0 / 0.6 - 0.9 cm

 LV Relative Wall Thickness        0.7                   

 RV Internal Dim ED PLAX           3.9 cm                

 LVOT Diameter                     1.4 cm                

 LA Systolic Diameter LX           4.8 cm                3.0 - 4.0 / 2.7 - 3.8 cm

 LV Ejection Fraction MOD 4C       52.3 %                

 LV Cardiac Index MOD 4C           1422.5 cm/minm     

 LV Ejection Fraction 4C AL        53.7 %                

 LV Cardiac Index 4C AL            1508.5 cm/minm     

 

 M-MODE

 Aortic Root Diameter MM           2.6 cm                

 LA Systolic Diameter MM           4.3 cm                

 LA Ao Ratio MM                    1.7                   

 AV Cusp Separation MM             1.6 cm                

 

 DOPPLER

 AV Peak Velocity                  139.0 cm/s            

 AV Peak Gradient                  7.7 mmHg              

 LVOT Peak Velocity                100.0 cm/s            

 LVOT Peak Gradient                4.0 mmHg              

 AV Area Cont Eq pk                1.1 cm               

 MV Area PHT                       4.2 cm               

 LV E' Lateral Velocity            7.1 cm/s              

 LV E' Septal Velocity             6.3 cm/s              

 TR Peak Velocity                  307.0 cm/s            

 TR Peak Gradient                  37.7 mmHg             

 Right Atrial Pressure             10.0 mmHg             

 Pulmonary Artery Systolic Pressu  47.7 mmHg             

 Right Ventricular Systolic Press  47.7 mmHg             

 PV Peak Velocity                  100.0 cm/s            

 PV Peak Gradient                  4.0 mmHg              

 

 

 FINDINGS

 

 LEFT VENTRICLE

 The left ventricular systolic function is normal with an estimated ejection fraction in the range of
 55-60%. 

 Normal left ventricular size. 

 Wall thickness is normal. 

 No regional wall motion abnormalities are present. 

 

 RIGHT VENTRICLE

 The right ventricle is mildly dilated. 

 

 LEFT ATRIUM

 The left atrial size is mild-to-moderately dilated. 

 

 RIGHT ATRIUM

 The right atrial size is moderately dilated. 

 

 ATRIAL SEPTUM

 Normal atrial septal thickness without atrial level shunting by limited color doppler interrogation.
  

 

 AORTA

 The aortic root and proximal ascending aorta are normal in size on limited imaging.  

 

 MITRAL VALVE

 Structurally normal mitral valve. 

 Mild mitral valve regurgitation. 

 

 AORTIC VALVE

 Trileaflet aortic valve. 

 Diffuse calcification of the aortic valve. 

 

 TRICUSPID VALVE

 Structurally normal tricuspid valve. 

 There is mild to moderate tricuspid valve regurgitation. 

 The estimated pulmonary arterial pressure is 47.7 mmHg. 

 

 PULMONARY VALVE

 No pulmonary valve regurgitation or stenosis.  

 

 VESSELS

 The inferior vena cava is normal in size.  

 

 PERICARDIUM

 There is trace pericardial effusion present. 

 

 

 

 

  Willie Weaver MD

  (Electronically Signed)

  Final Date:11 February 2018 10:27

## 2018-02-11 NOTE — RADRPT
EXAM DATE/TIME:  02/11/2018 14:45 

 

[facility] COMPARISON:     

CHEST SINGLE AP, January 27, 2018, 3:15.

 

                     

INDICATIONS :     

Shortness of breath and cough for 6 days.

                     

 

MEDICAL HISTORY :            

Stroke. Renal disease, end stage. Renal failure, chronic. A-Fib   

 

SURGICAL HISTORY :        

Appendectomy.

 

ENCOUNTER:     

Subsequent                                        

 

ACUITY:     

4 - 6 days      

 

PAIN SCORE:     

0/10

 

LOCATION:     

Bilateral chest 

 

FINDINGS:     

Patient has developed a left basilar opacity with indistinctness of the hemidiaphragm and is question
 as to mild homogeneous increased density in the left lung suggesting that the majority this represen
ts effusion.

 

CONCLUSION:     

Development of left basilar density in the majority probably representing effusion.

 

 

 

 Teo Dobbs MD on February 11, 2018 at 15:25           

Board Certified Radiologist.

 This report was verified electronically.

## 2018-02-12 VITALS
TEMPERATURE: 97.6 F | RESPIRATION RATE: 26 BRPM | SYSTOLIC BLOOD PRESSURE: 93 MMHG | DIASTOLIC BLOOD PRESSURE: 51 MMHG | HEART RATE: 104 BPM

## 2018-02-12 VITALS
RESPIRATION RATE: 21 BRPM | SYSTOLIC BLOOD PRESSURE: 99 MMHG | TEMPERATURE: 97.2 F | HEART RATE: 101 BPM | OXYGEN SATURATION: 97 % | DIASTOLIC BLOOD PRESSURE: 60 MMHG

## 2018-02-12 VITALS
HEART RATE: 103 BPM | DIASTOLIC BLOOD PRESSURE: 23 MMHG | SYSTOLIC BLOOD PRESSURE: 54 MMHG | TEMPERATURE: 97.7 F | RESPIRATION RATE: 26 BRPM | OXYGEN SATURATION: 96 %

## 2018-02-12 VITALS
TEMPERATURE: 97.3 F | DIASTOLIC BLOOD PRESSURE: 69 MMHG | HEART RATE: 81 BPM | RESPIRATION RATE: 20 BRPM | SYSTOLIC BLOOD PRESSURE: 131 MMHG | OXYGEN SATURATION: 98 %

## 2018-02-12 VITALS
TEMPERATURE: 97.4 F | RESPIRATION RATE: 15 BRPM | SYSTOLIC BLOOD PRESSURE: 61 MMHG | HEART RATE: 97 BPM | DIASTOLIC BLOOD PRESSURE: 25 MMHG

## 2018-02-12 VITALS
SYSTOLIC BLOOD PRESSURE: 56 MMHG | DIASTOLIC BLOOD PRESSURE: 25 MMHG | HEART RATE: 112 BPM | RESPIRATION RATE: 19 BRPM | OXYGEN SATURATION: 94 % | TEMPERATURE: 97.5 F

## 2018-02-12 VITALS — OXYGEN SATURATION: 97 %

## 2018-02-12 VITALS
RESPIRATION RATE: 13 BRPM | TEMPERATURE: 97 F | SYSTOLIC BLOOD PRESSURE: 49 MMHG | DIASTOLIC BLOOD PRESSURE: 26 MMHG | HEART RATE: 113 BPM

## 2018-02-12 VITALS — HEART RATE: 106 BPM

## 2018-02-12 VITALS — HEART RATE: 43 BPM

## 2018-02-12 LAB
ALBUMIN SERPL-MCNC: 1.4 GM/DL (ref 3.4–5)
ALP SERPL-CCNC: 157 U/L (ref 45–117)
ALT SERPL-CCNC: 10 U/L (ref 12–78)
AST SERPL-CCNC: 14 U/L (ref 15–37)
BASOPHILS # BLD AUTO: 0 TH/MM3 (ref 0–0.2)
BASOPHILS NFR BLD: 0.3 % (ref 0–2)
BILIRUB SERPL-MCNC: 0.5 MG/DL (ref 0.2–1)
BUN SERPL-MCNC: 69 MG/DL (ref 7–18)
CALCIUM SERPL-MCNC: 6.5 MG/DL (ref 8.5–10.1)
CALCIUM TP COR SERPL-MCNC: 8.7 MG/DL (ref 8.5–10.1)
CHLORIDE SERPL-SCNC: 104 MEQ/L (ref 98–107)
CREAT SERPL-MCNC: 4.06 MG/DL (ref 0.6–1.3)
DIGOXIN SERPL-MCNC: 0.5 NG/ML (ref 0.8–2)
EOSINOPHIL # BLD: 0.1 TH/MM3 (ref 0–0.4)
EOSINOPHIL NFR BLD: 1.3 % (ref 0–4)
ERYTHROCYTE [DISTWIDTH] IN BLOOD BY AUTOMATED COUNT: 20.3 % (ref 11.6–17.2)
FIBRINOGEN PPP-MCNC: 199 MG/DL (ref 227–377)
GFR SERPLBLD BASED ON 1.73 SQ M-ARVRAT: 15 ML/MIN (ref 89–?)
GLUCOSE SERPL-MCNC: 192 MG/DL (ref 74–106)
HCO3 BLD-SCNC: 30 MEQ/L (ref 21–32)
HCT VFR BLD CALC: 16.7 % (ref 39–51)
HGB BLD-MCNC: 5.6 GM/DL (ref 13–17)
INR PPP: 1.5 RATIO
LYMPHOCYTES # BLD AUTO: 0.3 TH/MM3 (ref 1–4.8)
LYMPHOCYTES NFR BLD AUTO: 5 % (ref 9–44)
MAGNESIUM SERPL-MCNC: 2.2 MG/DL (ref 1.5–2.5)
MCH RBC QN AUTO: 32 PG (ref 27–34)
MCHC RBC AUTO-ENTMCNC: 33.7 % (ref 32–36)
MCV RBC AUTO: 94.9 FL (ref 80–100)
MONOCYTE #: 0.4 TH/MM3 (ref 0–0.9)
MONOCYTES NFR BLD: 6.1 % (ref 0–8)
NEUTROPHILS # BLD AUTO: 5.5 TH/MM3 (ref 1.8–7.7)
NEUTROPHILS NFR BLD AUTO: 87.3 % (ref 16–70)
PLATELET # BLD: 162 TH/MM3 (ref 150–450)
PMV BLD AUTO: 7.9 FL (ref 7–11)
PROT SERPL-MCNC: 3.2 GM/DL (ref 6.4–8.2)
PROTHROMBIN TIME: 14.8 SEC (ref 9.8–11.6)
RBC # BLD AUTO: 1.76 MIL/MM3 (ref 4.5–5.9)
SODIUM SERPL-SCNC: 142 MEQ/L (ref 136–145)
WBC # BLD AUTO: 6.2 TH/MM3 (ref 4–11)

## 2018-02-12 PROCEDURE — B4131ZZ FLUOROSCOPY OF SPLENIC ARTERIES USING LOW OSMOLAR CONTRAST: ICD-10-PCS | Performed by: RADIOLOGY

## 2018-02-12 PROCEDURE — B4121ZZ FLUOROSCOPY OF HEPATIC ARTERY USING LOW OSMOLAR CONTRAST: ICD-10-PCS | Performed by: RADIOLOGY

## 2018-02-12 PROCEDURE — 05HM33Z INSERTION OF INFUSION DEVICE INTO RIGHT INTERNAL JUGULAR VEIN, PERCUTANEOUS APPROACH: ICD-10-PCS | Performed by: INTERNAL MEDICINE

## 2018-02-12 RX ADMIN — IPRATROPIUM BROMIDE AND ALBUTEROL SULFATE SCH AMPULE: .5; 3 SOLUTION RESPIRATORY (INHALATION) at 09:38

## 2018-02-12 RX ADMIN — IPRATROPIUM BROMIDE AND ALBUTEROL SULFATE SCH AMPULE: .5; 3 SOLUTION RESPIRATORY (INHALATION) at 11:41

## 2018-02-12 NOTE — PD.PROCEDR
Central Line Procedure


REASON FOR PROCEDURE


Central venous access





PROCEDURE PERFORMED


Central line placement: Right IJ CVL





CONSENT


Informed consent for procedure was obtained from competent patient.  The risks 

and benefits of the procedure were discussed to include but limited to bleeding

, clot formation, infection, and even death.





ANESTHESIA


Local injection of 1% Lidocaine





DESCRIPTION OF THE PROCEDURE


The patient was placed in supine, mild Trendelenburg position.  The area was 

exposed and cleansed with ChloraPrep, times two.  Large sterile drape was used 

to cover the patient, with the site exposed, under sterile conditions including 

cap, face mask, sterile gown, and sterile gloves.  On single attempt, the 

introducer needle was inserted with negative pressure in syringe and venous 

flash was obtained.  The guide wire was then advanced without any restriction 

and the needle was removed.  The dilator was used without any complications.  

Using Seldinger technique the antibiotic coated triple lumen catheter was 

advanced over the guide wire to a depth of 16 centimeters.  The guide wire was 

removed.  All ports were aspirated with dark venous blood return and flushed 

easily with sterile saline.  All ports were capped.  Antibiotic disc was placed 

around central line at puncture site.  The central line was secured to the skin 

with two interrupted 2.0 silk sutures.  The area was bandaged with sterile see-

through central line bandage.





RADIOLOGICAL DATA


Ultrasound guidance was used to locate right internal jugular vein.  Doppler/

color flow was used to confirm venous flow.





COMPLICATIONS:


No apparent complications





ESTIMATED BLOOD LOSS:


Less than 1 cc.











Chase Steen MD Feb 12, 2018 07:39

## 2018-02-12 NOTE — HHI.CCPN
Subjective


Remarks/Hospital Course


67 year-old male with a medical history significant for end-stage renal disease 

on peritoneal dialysis who was noted to be severely hypotensive and lethargic 

at the dialysis center on 1/25 and 911 was called and patient was transferred 

to [facility] ER.  On arrival he was noted to be hypotensive with SBP 70s 

and heart rate 100s.  He also reportedly had significant melena and rectal 

bleeding following arrival.  A right IJ central line was placed by ER 

physician.  Patient's wife arrived and wanted to make patient DNR status and 

did not want blood transfusions and wished transferring to hospice however 

subsequently patient woke up and wanted to continue aggressive care including 

blood transfusions.  ER physician contacted me questioning patient's wife's 

ability to make decisions for him due to concern for secondary gain as well as 

possible psychiatric illness.  Patient was accepted for admission by critical 

care medicine service.  4 units PRBCs were ordered to be transfused stat by ER 

physician.  When I arrived to see the patient he was laying in the ER stretcher 

on nasal cannula on 5 mics of Levophed.  He is drowsy but easily arousable.  He 

knew he was at the hospital.


He could not give me details of his history.  He did tell me that Dr. Hoang is 

his nephrologist.





1/27 Patient is on Levophed 2 mics, s/p HD yesterday. Awake and alert on 

Protonix drip. s/p EGD yesterday showed 2 large Duodenal ulcer, esophagitis and 

gastritis.


1/28:  Afebrile.  Currently off norepinephrine but MAP is around 60 currently.  

States he is okay with removing peritoneal dialysis catheter.  Will discuss 

with general surgery in nephrology in a.m.


1/29:  Blood pressures are borderline.  Awake and alert and eating popsicles 

currently.  Patient's okay with removing peroneal dialysis catheter.  Dr. Koch as yet to see patient today.  Agree with ID and nephrology's 

recommendations for removal.


1/30 Patient s/p PD catheter removal this morning. On Levophed 2 mics. Afebrile.


1/31:  Postoperative day #1 PD catheter removal.  Remains on norepinephrine at 

2 g per minute.  Asymptomatic.  Plan for hemodialysis today.


2/1:  Afebrile.  Intermittently A. fib with RVR, currently rate controlled.  

Denies chest pain or shortness of breath.  Intermittently on phenylephrine drip


2/2:  Resting in bed in no acute distress.  Remains on Jonhy-Synephrine drip at 

60 mics grams per minute.  Appears more depressed today.  Status post -3 L of 

hemodialysis.


2/3:  Resting in bed in no acute distress.  Afebrile.  Remains on low-dose 

phenylephrine at 50 mics grams per minute.  More attentive today.


2/4: No acute issues overnight.  Patient remains on low-dose phenylephrine 

currently at 40 mcgs/min.  Patient continues on Midrin 3 times a day.  

Hemoglobin stable.  Patient tolerating diet.


2/5: The patient continues on Phenylephrine infusion, now increased to 60 mcgs/

min.


2/6: Phenylephrine continues at 30mcg/min.  Patient up out of bed with physical 

therapy today.  IHD 3.5 L off yesterday


2/7: Phenylephrine weaned off since 3 AM.  Patient currently undergoing 

hemodialysis and is hemodynamically stable with maps in the low 70s.  The 

patient is tolerating a diet.  PT has been initiated and patient is out of bed 

ambulating 2-3 steps in the room yesterday.





--------------------------------------------------------------------------------

--------------------------





Subjective





2/12: Stat reconsult secondary to hypotension likely recurrent upper GI bleed 

from duodenal ulcer.  During a recent EGD with grade D esophagitis, severe 

gastritis pathology negative and 2 large duodenal ulcers.  IR has been notified 

planned for attempted embolization gastroduodenal artery.  Central line 

emergently placed and currently on phenylephrine drip with 2 units PRBCs to be 

given when available





Objective





Vital Signs








  Date Time  Temp Pulse Resp B/P (MAP) Pulse Ox O2 Delivery O2 Flow Rate FiO2


 


2/12/18 06:15     97  15.00 100


 


2/12/18 04:00  81      


 


2/12/18 04:00 97.3  20 131/69 (89)    


 


2/12/18 00:00      Nasal Cannula  








Result Diagram:  


2/11/18 2129 2/11/18 2129





Other Results





Microbiology








 Date/Time


Source Procedure


Growth Status


 


 


 1/26/18 07:19


Blood Peripheral Aerobic Blood Culture - Final


NO GROWTH IN 5 DAYS Complete


 


 1/26/18 07:19


Blood Peripheral Anaerobic Blood Culture - Final


NO GROWTH IN 5 DAYS Complete





 1/26/18 15:20


Fluid Peritoneal Fluid Fungal Smear - Final


NO FUNGAL ELEMENTS SEEN. Resulted


 


 1/26/18 15:20 Fungal Culture - Preliminary


Candida Albicans Resulted


 


 2/9/18 22:00


Stool Stool Stool Occult Blood (CONOR) - Final


HEMOCCULT POSITIVE Complete








Imaging





Last Impressions








Chest X-Ray 2/11/18 0000 Signed





Impressions: 





 Service Date/Time:  Sunday, February 11, 2018 14:45 - CONCLUSION:  Development 





 of left basilar density in the majority probably representing effusion.     





 Teo Dobbs MD 








Objective Remarks


GENERAL: 67 year  male very pale appearing resting in bed in mild 

respiratory distress


SKIN: Pale and dry..  No rash


HEAD: Atraumatic. Normocephalic. 


EYES:  No scleral icterus. No injection or drainage. 


ENT: No nasal bleeding or discharge.  Mucous membranes pink and dry.  

Oropharynx without erythema or thrush.


NECK: Trachea midline.  Supple.


CARDIOVASCULAR: Tachycardic, IRR.  S1, S2.  No S4.


RESPIRATORY: Diminished breath sounds left lower lobe.


GASTROINTESTINAL: Abdomen soft, nondistended.  PD catheter has been removed.  

Site is clean dry and intact.  No signs erythema drainage.  Left upper 

extremity AV fistula with positive thrill will palpated


MUSCULOSKELETAL: Patient has a well-healed rt heel ulcer.  Venous stasis 

changes noted to his bilateral lower extremities.


NEUROLOGICAL: Awake and weak and mildly confused. No obvious cranial nerve 

deficits.  Moves bilateral upper extremities and lower extremities 

spontaneously.  Sensation appears intact..


BACK: Stage I/early decubitus in his sacral area.


Procedures


EGD and removal of PD catheter


Urinary Catheter:  No


Assessment to:  Continue


Vascular Central Line Catheter:  Yes


Date of Insertion:  Feb 12, 2018


Line:  Central Venous Catheter


Side:  Right


Location:  Internal, Jugular





A/P


Assessment and Plan


Neuro/Psych: 





Depression


Chronic Pain syndrome


History of CVA 1986/2007





Acetaminophen 600 mg p.o. every 6 hours as needed fever/headache


Holding paroxetine 10 mg daily/home medication.  Resume when clinically 

indicated


Avoid sedatives and narcotics including tramadol/home medication, monitor neuro 

status. Awake and alert


Holding clopidogrel 75 mg daily.  





CV: 





Atrial fibrillation rate controlled


History of essential hypertension


Mild pulmonary hypertension





Phenylephrine at 60  g per minute-to maintain mean arterial pressure greater 

than equal 65


Monitor HR and BP keep MAP >65mmHg


Lactic acid 2.2 on 1/27


Holding clopidogrel 75 mg daily light of duodenal ulcer


Holding lisinopril 20 mill grams daily, isosorbide dinitrate 30 mg daily and 

metoprolol 25 mill grams daily in light of hypotension


random Cortisol level.  20


Continue Midodrine 10 mg 3 times a day


2D echocardiogram 2/11 revealed EF 55-60%.  Bilateral atrial enlargement.  RV 

dilatation.  PAP 47.7 mmHg.








Pulm: 





Left pleural effusion


Acute respiratory failure





Continue with oxygen keep sat >92%.  Currently on nonrebreather


Albuterol aerosols every 4 hours with albuterol aerosols every 2 hours when 

necessary dyspnea





GI/liver: 





Upper GI bleed secondary to duodenal ulcer





Currently n.p.o.


On pantoprazole drip at 8 mg an hour after receiving 80 mg bolus


s/p EGD 1/26 which showed 2 large duodenal ulcers, grade D esophagitis, 

gastritis.


Repeat EEG 2/10 --The esophagus this appeared to be unremarkable, stomach this 

too appeared to be unremarkable, duodenum there were 2 large ulcerations in the 

duodenal sweep and the first portion of the duodenum the larger of the 2 ulcers 

was the one that seen the first portion of the duodenum there was a visible 

vessel noted this was cauterized successfully using the Aubree probe


GI continue to follow


IR for possible i embolization n gastroduodenal artery today





Renal/: 





Status post PD catheter removal  Dr. Juarez


End-stage renal disease on hemodialysis M/W/F





Monitor renal function, I/O's, avoid nephrotoxins.


Pathology Dr. Hoang.  General Surgery has followed- Dr. Juarez fungal 

infection requiring PD catheter removal on 1/30. 





Heme:





Macrocytic anemia


Acute blood loss anemia





Monitor CBC, s/p transfusion 2units PRBC on 1/26.  Transfuse 8 units PRBCs 

since admission


Hgb s every 6 hours ordered.  Coags pending





Endocrine: 





Diabetes mellitus


Elevated TSH - repeat in 3-6 weeks.  Free T4 normal.  Free T3 low.  Would not 

initiate any levothyroxine at this time in this acute clinical setting





SSI for glycemic control with low Novulog every 6 hours


Holding Tradjenta 5 mg daily





ID: 





Infected peritoneal dialysis catheter/peritonitis fungal





Continue fluconazole 200 mg po daily stop date 2/12.


Monitor for signs of infections ( Fever, WBC) follow up on cultures.  

Peritoneal fluid 1/26 positive for yeast/Candida


ID is following.





MSK:  





Right heel ulcer


Sacral decubitus ulcer





2/4 Wound care consulted evaluate and treat


2/4 Transfer pt on [facility] bed





Prophylaxis: Pantoprazole/no pharmacological prophylaxis in light of acute 

upper GI bleeding





Lines: Right IJ CV CVL 2/12- present





Critical Care:


The total critical care time was 35 minutes. Time to perform other separately 

billable procedures was not included in the critical care time.








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  GDA 

supplies the liver henceforth  cannot embolize.  Discussed with Dr. Juarez.  

He will have Dr. Rosa evaluate.  Discussed with Dr. Vazquez/GI.  Recommended 

general surgery versus esophageal/duodenal stent.  Discussed with wife Yessy Awan.  All 3 options including surgery which would likely include a partial 

gastrectomy duodenectomy, placement of esophageal stents as we do not have 

duodenal stents here versus comfort measures.  Discussed with palliative care 

Dr. Holley.  She wishes to proceed with comfort measures at this time.  Notify 

Dr. Moe Vazquez of wife's wishes.  Dr. Kennedy okay as long as wife is 

aware of options available which she has verified with myself and palliative 

care.  Consultation to General surgery canceled after discussing with Dr. Rosa..











Chase Steen MD Feb 12, 2018 07:38

## 2018-02-12 NOTE — HHI.NPPN
Subjective


General Problems:  Anemia


Renal Failure:  End Stage Renal Disease


History of Present Illness


Patient is a very pleasant 67 year old male who came into the emergency room on 

01/26/18 hypotensive, lethargic, and with a hemoglobin of 6.7.  He was 

transfused 2 units of PRBC's with a recheck pending.  He was initially put on 

Levophed for blood pressure support however it is currently not running. Has a 

past medical history of End -stage renal disease with peritoneal dialysis, AFIB

, diabetes, arthritis, chronic pain, and hx of strokes.    Onset of acute 

epigastric pain and discomfort noted for 5 days ago, but worsened over the past 

24 hours.  Reported loose stools for 3 weeks.  In the emergency room patient 

did report melena, nausea, mild vomiting undigested food yesterday, but denied 

any coffee ground emesis. Patient has AV fistula and will have dialysis while 

here.  Per patient PD catheter needs to be removed secondary to infection.


Additional Remarks


Transferred to ICU for GI bleed.  Patient is agonally breathing. Wife at bedside





 (Gellermann,Diane M. ARNP)





Review of Systems


General


Constitutional:  Fatigue


General Remarks


weakness


 (Gellermann,Diane M. ARNP)





Gastrointestinal


Gastrointestinal:  Blood/Tarry Stools


 (Gellermann,Diane M. ARNP)





Objective Data


Data











 2/12/18 2/13/18





 19:00 07:00


 


Intake Total 5 ml 


 


Balance 5 ml 


 


  


 


Blood Product IV Normal Saline Flush 5 ml 











Vital Signs








  Date Time  Temp Pulse Resp B/P (MAP) Pulse Ox O2 Delivery O2 Flow Rate FiO2


 


2/12/18 09:39      Non-Rebreather 15.00 


 


2/12/18 09:06 97.6 104 26 93/51    


 


2/12/18 07:00      Non-Rebreather 15.00 


 


2/12/18 06:15     97  15.00 100


 


2/12/18 04:00  81      


 


2/12/18 04:00 97.3 58 20 131/69 (89) 98   


 


2/12/18 00:00 97.2 118 21 99/60 (73) 97   


 


2/12/18 00:00      Nasal Cannula 3.00 


 


2/12/18 00:00  101      


 


2/11/18 21:00      Nasal Cannula 3.00 


 


2/11/18 20:00  104      


 


2/11/18 20:00 97.4 92 18 110/67 (81) 100   


 


2/11/18 16:26  85      


 


2/11/18 16:00 97.3 92 20 105/64 (78) 96   


 


2/11/18 12:00 97.1 93 20 94/61 (72) 97   


 


2/11/18 12:00  103      








 (Gellermann,Diane M. ARNP)


-:  


2/12/18 0723                                                                   

             2/12/18 0723








Physical Exam


General


Appearance:  Malnourished


 (Gellermann,Diane M. ARNP)





Pulmonary


Resp Exam:  Retractions, Labored


Resp Remarks


agonal


 (Gellermann,Diane M. ARNP)





Integumentary


Skin Exam:  Warm, Dry


 (Gellermann,Diane M. ARNP)





Extremeties


Extremities Exam:  No Edema


 (Gellermann,Diane M. ARNP)





Neurologic


Neuro Exam:  Alert


 (Gellermann,Diane M. ARNP)





Psychiatric


Psych Exam:  Appropriate Responses


 (Gellermann,Diane M. ARNP)





Assessment/Plan


Problem List:  


(1) ESRD (end stage renal disease) on dialysis


ICD Codes:  N18.6 - End stage renal disease; Z99.2 - Dependence on renal 

dialysis


Plan:  Patient is agonally breathing.  Patient is a DNR


Palliative care consulted


HGB at last check at 5.6 PRBC infusing


Phenylephrine drip 


Wife at bedside





(2) Peritonitis associated with peritoneal dialysis


ICD Codes:  T85.71XA - Infection and inflammatory reaction due to peritoneal 

dialysis catheter, initial encounter


Plan:  Peritoneal fluid culture positive for Candida. 


On fluconazole with stop date of the 13th


PD catheter removed


ID and surgery following. 





(3) Hypotension


ICD Codes:  I95.9 - Hypotension, unspecified


Status:  Acute


Plan:  Sepsis present on admission. 


Continue supportive care. 





(4) GI bleed


ICD Codes:  K92.2 - Gastrointestinal hemorrhage, unspecified


Status:  Acute


Plan:  s/p EGD: duodenal ulcer, gastritis, esophagitis. 


S/P on 2/10 Duodenal ulcer with high risk stigmata of bleeding











(5) Anemia


ICD Codes:  D64.9 - Anemia, unspecified


Plan:  See above


 (Gellermann,Diane M. ARNP)


Problem List:  


(1) ESRD (end stage renal disease) on dialysis


ICD Codes:  N18.6 - End stage renal disease; Z99.2 - Dependence on renal 

dialysis


Plan:  Patient is agonally breathing.  Patient is a DNR


Palliative care consulted


HGB at last check at 5.6 PRBC infusing


Phenylephrine drip 


Wife at bedside.


Made DNR by family, Palliative care following.





(2) Peritonitis associated with peritoneal dialysis


ICD Codes:  T85.71XA - Infection and inflammatory reaction due to peritoneal 

dialysis catheter, initial encounter


Plan:  Peritoneal fluid culture positive for Candida. 


On fluconazole with stop date of the 13th


PD catheter removed


ID and surgery following. 





(3) Hypotension


ICD Codes:  I95.9 - Hypotension, unspecified


Status:  Acute


Plan:  Sepsis present on admission. 


Continue supportive care. 





(4) GI bleed


ICD Codes:  K92.2 - Gastrointestinal hemorrhage, unspecified


Status:  Acute


Plan:  s/p EGD: duodenal ulcer, gastritis, esophagitis. 


S/P on 2/10 Duodenal ulcer with high risk stigmata of bleeding











(5) Anemia


ICD Codes:  D64.9 - Anemia, unspecified


Plan:  See above


 (Carlita Hoang MD)





Problem Qualifiers





(1) Hypotension:  


Qualified Codes:  I95.9 - Hypotension, unspecified


(2) GI bleed:  


Qualified Codes:  K92.2 - Gastrointestinal hemorrhage, unspecified


(3) Anemia:  








Gellermann,Diane M. ARNP Feb 12, 2018 09:55


Carlita Hoang MD Feb 12, 2018 19:26

## 2018-02-12 NOTE — HHI.GIFU
Subjective


Remarks


Pt had rectal bleeding over night, s/p angiogram but no embolization. wife 

opted for comfort measures.  she is upset that he is receiving blood and 

demands that RN stop blood transfusion. pt in bed in no acute distress.  does 

not rouse to exam


 (Juliette Pardo)





Objective


Vitals I&O





Vital Signs








  Date Time  Temp Pulse Resp B/P (MAP) Pulse Ox O2 Delivery O2 Flow Rate FiO2


 


2/12/18 11:33   15     


 


2/12/18 10:40 97.4 97 15 61/25    


 


2/12/18 10:25 97.5 112 19 56/25 94   


 


2/12/18 10:00  106      


 


2/12/18 09:39      Non-Rebreather 15.00 


 


2/12/18 09:21 97.7 103 26 54/23 96   


 


2/12/18 09:06 97.6 104 26 93/51    


 


2/12/18 07:00      Non-Rebreather 15.00 


 


2/12/18 06:15     97  15.00 100


 


2/12/18 04:00  81      


 


2/12/18 04:00 97.3 58 20 131/69 (89) 98   


 


2/12/18 00:00 97.2 118 21 99/60 (73) 97   


 


2/12/18 00:00      Nasal Cannula 3.00 


 


2/12/18 00:00  101      


 


2/11/18 21:00      Nasal Cannula 3.00 


 


2/11/18 20:00  104      


 


2/11/18 20:00 97.4 92 18 110/67 (81) 100   


 


2/11/18 16:26  85      


 


2/11/18 16:00 97.3 92 20 105/64 (78) 96   


 


2/11/18 12:00 97.1 93 20 94/61 (72) 97   


 


2/11/18 12:00  103      














I/O      


 


 2/11/18 2/11/18 2/11/18 2/12/18 2/12/18 2/12/18





 07:00 15:00 23:00 07:00 15:00 23:00


 


Intake Total 177 ml 15 ml 720 ml  1075 ml 


 


Balance 177 ml 15 ml 720 ml  1075 ml 


 


      


 


Intake Oral 0 ml  720 ml   


 


IV Total 177 ml 15 ml   10 ml 


 


Packed Cells     800 ml 


 


Blood Product IV Normal Saline Flush     265 ml 


 


# Voids 0  0   


 


# Bowel Movements 2  1   








Laboratory





Laboratory Tests








Test


  2/11/18


21:29 2/12/18


07:23 2/12/18


07:38


 


White Blood Count 7.4  6.2  


 


Red Blood Count 3.29  1.76  


 


Hemoglobin 10.4  5.6  


 


Hematocrit 30.9  16.7  


 


Mean Corpuscular Volume 94.1  94.9  


 


Mean Corpuscular Hemoglobin 31.8  32.0  


 


Mean Corpuscular Hemoglobin


Concent 33.8 


  33.7 


  


 


 


Red Cell Distribution Width 20.7  20.3  


 


Platelet Count 160  162  


 


Mean Platelet Volume 7.9  7.9  


 


Neutrophils (%) (Auto) 89.2  87.3  


 


Lymphocytes (%) (Auto) 4.2  5.0  


 


Monocytes (%) (Auto) 5.3  6.1  


 


Eosinophils (%) (Auto) 1.0  1.3  


 


Basophils (%) (Auto) 0.3  0.3  


 


Neutrophils # (Auto) 6.6  5.5  


 


Lymphocytes # (Auto) 0.3  0.3  


 


Monocytes # (Auto) 0.4  0.4  


 


Eosinophils # (Auto) 0.1  0.1  


 


Basophils # (Auto) 0.0  0.0  


 


CBC Comment DIFF FINAL  AUTO DIFF  


 


Differential Comment


   


  AUTO DIFF


CONFIRMED 


 


 


Blood Urea Nitrogen 66  69  


 


Creatinine 4.14  4.06  


 


Random Glucose 203  192  


 


Calcium Level 7.8  6.5  


 


Magnesium Level 2.3  2.2  


 


Sodium Level 140  142  


 


Potassium Level 4.7  5.4  


 


Chloride Level 100  104  


 


Carbon Dioxide Level 30.2  30.0  


 


Anion Gap 10  8  


 


Estimat Glomerular Filtration


Rate 15 


  15 


  


 


 


Prothrombin Time  14.8  


 


Prothromb Time International


Ratio 


  1.5 


  


 


 


Activated Partial


Thromboplast Time 


  32.0 


  


 


 


Fibrinogen  199  


 


Total Protein  3.2  


 


Albumin  1.4  


 


Alkaline Phosphatase  157  


 


Aspartate Amino Transf


(AST/SGOT) 


  14 


  


 


 


Alanine Aminotransferase


(ALT/SGPT) 


  10 


  


 


 


Total Bilirubin  0.5  


 


Protein Corrected Calcium  8.7  


 


Total Creatine Kinase  29  


 


Digoxin Level  0.5  


 


Lactic Acid Level   3.9 














 Date/Time


Source Procedure


Growth Status


 


 


 1/26/18 07:19


Blood Peripheral Aerobic Blood Culture - Final


NO GROWTH IN 5 DAYS Complete


 


 1/26/18 07:19


Blood Peripheral Anaerobic Blood Culture - Final


NO GROWTH IN 5 DAYS Complete





 1/26/18 15:20


Fluid Peritoneal Fluid Fungal Smear - Final


NO FUNGAL ELEMENTS SEEN. Resulted


 


 1/26/18 15:20 Fungal Culture - Preliminary


Candida Albicans Resulted


 


 2/9/18 22:00


Stool Stool Stool Occult Blood (CONOR) - Final


HEMOCCULT POSITIVE Complete








Imaging





Last Impressions








Chest X-Ray 2/12/18 0721 Signed





Impressions: 





 Service Date/Time:  Monday, February 12, 2018 07:22 - CONCLUSION:  1. Right IJ 





 central line in good position without pneumothorax. 2. Left lower lobe 

airspace 





 disease and slightly worsening pleural effusion. 3. Improved aeration in the 





 right lower lung zone.     Juancho Lockhart MD 








Physical Exam


HEENT: Normocephalic; atraumatic


CHEST:  coarse breath sounds


CARDIAC:  irr HR


ABDOMEN:  soft, BS faint


EXTREMITIES: No clubbing, cyanosis, or edema.


CNS: lethargic, did not rouse to exam


 ([facility])





Assessment and Plan


Assessment:  


(1) Anemia


ICD Codes:  D64.9 - Anemia, unspecified


(2) GI bleed


ICD Codes:  K92.2 - Gastrointestinal hemorrhage, unspecified


Status:  Acute


(3) History of renal failure


ICD Codes:  Z87.448 - Personal history of other diseases of urinary system


Status:  Acute


Plan


- History of GIB- previously following this patient to evaluate for anemia and 

melena. 


  S/P EGD --> Severe grade D esophagitis, severe gastritis, and 2 large 

duodenal 


  ulcers almost taking over the duodenal bulb and first potion.  Pathology --> 

Gastric


  mucosa without significant histopathologic abnormality.  (small intestine) 

Fibrinopurulent


  exudate and inflamed granulation tissue, consistent with ulcer base. We 

recommended pt to


  continue Protonix, better control of diabetes, and avoid NSAIDs.  GI signed 

off and 


  recommended pt follow up on outpatient basis for colonoscopy.  Dr. Arredondo has 


  asked us to evaluate pt and determine if anticoagulation can be restarted.  

Pt  


  denies any continue melena, nausea, vomiting.  H/H currently 10/29.4, last 

transfused


  2 U PRBCs on Feb 7th with dialysis.  No obvious continuation of GIB, anemia 

likely


  secondary to renal insufficiency.  OK to restart anticoagulation for a GI 

standpoint. 





(2/10) Pt received one dose of Plavix last night.  Began with rectal bleeding 

over night.


  Spoke with RN, Anjali who states it is maroon in color.   She is unsure how 

many


  episodes of bleeding he has had over night.   H/H dropped from 10/29.4 

yesterday 


  morning to 8.4/25.3 late last night.  Has now received 2 U PRBCs.  





(2/11) S/P EGD yesterday --> Duodenal ulcer with high risk stigmata of bleeding.


  Pt reports one BM today, states possibly old blood, unsure.  Received 2 U 

PRBCs


  yesterday. H/H currently 11/33.1.  Protonix gtt DCd now on Protonix BID.  





2/12/18  went for angiogram, no embolization done, GDA supplying liver and 

hepatic artery occluded.  wife opting for comfort measures.


   hgb dropped to 5.3





Plan:


- comfort care per wife


- recommend hospice consult


- supportive care


- GI will sign off.  please reconsult if needed





Pt has been seen and examined by myself and Dr. Welch and this note is 

written on his behalf 


 (Juliette Pardo)


Physician Comments


Patient seen and examined


Agree with above


Continue with current supportive care


Monitor labs


Apparently patient is not a candidate for embolization and family has made a 

decision for comfort care and as such I do agree with the decision for hospice


We will sign off


 (Blake Welch MD)





Problem Qualifiers





(1) Anemia:  





(2) GI bleed:  


Qualified Codes:  K92.2 - Gastrointestinal hemorrhage, unspecified








Juliette Pardo Feb 12, 2018 11:48


Blake Welch MD Feb 12, 2018 19:01

## 2018-02-12 NOTE — RADRPT
EXAM DATE/TIME:  02/12/2018 08:41 

 

[facility] COMPARISON:  

No previous studies available for comparison.

 

 

INDICATIONS :      

Patient presents with GI Bleed in need of possible embolization.

                     

 

MEDICAL HISTORY :     

Atrial Fibrilation

Diabetes

Kidney Failure

Hx Strokes

 

SURGICAL HISTORY :     

Appendectomy

Cataracts removed with lens implants

Left hip replacement

 

ENCOUNTER:     

Initial

 

ACUITY:     

2 days

 

PAIN SCORE:     

                    

                     

 

FLUORO TIME:     

4.5 minutes

 

IMAGE SERIES:     

24

 

ACCESS SITE:     

Right Femoral artery 

                   

 

CONTRAST:       

1.)  40 cc Visipaque (iodixanol)

 

                                          

                                          

 

 

PROCEDURE :     

1.  Ultrasound-guided puncture of the access site.

2.  Conscious sedation with continuous EKG and Oximetry monitoring.

3.  Angiography of the celiac axis

4.  Angiography of the superior mesenteric artery

5.  Angiography of the inferior mesenteric artery

 

 

The patient is a 67-year-old with endstage renal disease. The patient was recently admitted for a GI 
bleed. The patient underwent endoscopy at which time there was cauterization of a large duodenal ulce
ration. The patient was stable for approximately 2-3 days. Immediately prior to the procedure the pat
ient again suffered a massive GI bleed.

 

 

The risks, benefits and alternatives to the procedure were explained to the patient's wife and verbal
 and written consent was obtained.  The site was prepped in sterile fashion.  Full sterile technique 
was used, including cap, mask, sterile gloves and gown and a large sterile sheet.  Hand hygiene and 2
% chlorhexidine and/or betadine/alcohol prep was utilized per protocol for cutaneous antisepsis.  Sohail
rile gel and sterile probe cover were utilized for ultrasound guidance.  The skin and subcutaneous ti
ssues were infiltrated with local anesthetic solution.  

 

With ultrasound and fluoroscopic guidance the right common femoral artery was accessed. A 4 [place] sh
eath was placed.

 

A 0.035 angle Glidewire and 4 [place] hook catheter were danced into the abdominal aorta. The celiac o
rigin was selected. 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 The stump of the hepatic artery was evide
nt however it occludes just distal to its origin. The splenic artery is patent but severely diseased 
in its proximal segment.

 

The SMA was easily selected. This is severely diseased as well. The examination demonstrates collater
al filling of the hepatic circulation via the gastroduodenal artery. No active bleeding from this was
 identified.

 

The distal branches of the SMA were assessed with a second injection. No active GI bleed was seen.

 

The catheter was withdrawn. The DARCI was selected catheter directed angiography was performed. No acti
ve bleeding from the DARCI distribution was evident.

 

The puncture site was closed with manual pressure and hemostasis was obtained. The patient tolerated 
the procedure well and there were no complications.

 

Conscious sedation was performed with the prescribed dosages and duration as above in the presence of
 an independent trained radiology nurse to assist in the monitoring of the patient.  EKG and oximetry
 remained stable throughout the procedure.

 

CONCLUSION:     

1. 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 
The stump is faintly visualized from the celiac injection. The liver feeds via collaterals from the S
MA. No active GI bleeding was identified. The gastroduodenal could not be prophylactically embolized 
due to it providing flow to the liver.

2. The SMA and DARCI are patent but severely diseased.

 

 

 

 Leonard Arriaza MD on February 12, 2018 at 10:40           

Board Certified Radiologist.

 This report was verified electronically.

## 2018-02-12 NOTE — HHI.PR
Addendum to Inpatient Note


Addendum Reason:  Additional Documentation


Additional Information


66 yo male admitted with GIB s/p endoscopy with cautery of duodenal ulcers  now 

with bright red blood per rectum, hypotension, and increased O2 requirement. 

Patient is awake and alert and denies CP, SOB, abdominal pain.





Objective


VS: BP 57/44, SpO2 100% on non-rebreather mask


Gen: Thin elderly white male resting in bed drowsy but in NAD


Lungs: CTAB, normal rate and effort


Heart: Tachycardic with regular rhythm, no murmur


Abd: Soft, NDNT. Bright red blood leaking onto bedsheets. 


MSK: No cyanosis or edema


Neuro: Awake and alert. Follows commands, responds to questions. 





A/P


66 yo male with GIB s/p endoscopy with cautery of duodenal ulcers on 2/10 now 

with bright red blood per rectum, hypotension, increased O2 requirement.


- NS bolus 1 L


- Type and cross 2 units


- Check H/H; transfuse if < 10 due to ongoing bleed


- BMP


- Transfer to ICU for continued monitoring/treatment


- Primary team aware











Heriberto Hernandez MD Feb 12, 2018 06:37

## 2018-02-12 NOTE — HHI.HCPN
Reason for visit


   a.  To assist with evaluation and management of symptoms including: weakness

, chronic pain, dyspnea.


   b.  To assist medical decision maker(s) with: better understanding of 

current medical conditions; weighing benefits/burdens of medical treatment 

options; making        


        medical treatment decisions.





Subjective/Interval History


Pt has duodenal ulcer, require transfusion.  Pt has active bleed.  Pt due for 

dialysis.  Pt currently tachypneic and unstable.  Pt was suppose to get IR for 

duodenal ulcer but they are not able to do it.  Case discussed with intensivist.


Family/friend interactions


I have called  Yessy Meehan pt's spouse x 3 and left voicemail.   Reportedly pt

's spouse is on her way.


Later I was able to meet with wife.  Wife see his clinical decline, spoke with 

her .  Wife made decisions for comfort measures only, no dialysis, or 

further medical treament, surgeries, transufsions, etc.   Amenable for me to 

consult hospice.  Amenable for comfort meds and comfort care.  Trying to reach 

other family for him to say hi.





Advance Directives


Living Will:  Never completed


Health Care Surrogate:  Copy in medical record


Durable Power of :  Copy in medical record


Advance Directive Specifics


Date completed:


1/21/18


Health Care Surrogate(s):


Healthcare surrogate document [1/21/18] actually names the patient as 

healthcare surrogate. durable power of  does not include healthcare 

decision making.  Palliative care assisted patient to complete new healthcare 

surrogate designation when he was alert and oriented he named his wife as 

healthcare surrogate.





Objective





Vital Signs








  Date Time  Temp Pulse Resp B/P (MAP) Pulse Ox O2 Delivery O2 Flow Rate FiO2


 


2/12/18 09:06 97.6 104 26 93/51    


 


2/12/18 07:00      Non-Rebreather 15.00 


 


2/12/18 06:15     97  15.00 100


 


2/12/18 04:00  81      


 


2/12/18 04:00 97.3 58 20 131/69 (89) 98   


 


2/12/18 00:00 97.2 118 21 99/60 (73) 97   


 


2/12/18 00:00      Nasal Cannula 3.00 


 


2/12/18 00:00  101      


 


2/11/18 21:00      Nasal Cannula 3.00 


 


2/11/18 20:00  104      


 


2/11/18 20:00 97.4 92 18 110/67 (81) 100   


 


2/11/18 16:26  85      


 


2/11/18 16:00 97.3 92 20 105/64 (78) 96   


 


2/11/18 12:00 97.1 93 20 94/61 (72) 97   


 


2/11/18 12:00  103      














Intake & Output  


 


 2/12/18 2/12/18





 07:00 19:00


 


Intake Total  5 ml


 


Balance  5 ml


 


  


 


Blood Product IV Normal Saline Flush  5 ml








Physical Exam


CONSTITUTIONAL/GENERAL: Frail. tachpneic


TUBES/LINES/DRAINS: Central line rt IJ.  Peripheral IV upper extremity.


SKIN: Pale.  Scattered petechia/ecchymosis on upper extremities. Chronic venous 

insufficiency of lower extremities.


NECK: Trachea midline. Supple, nontender. No palpable thyroid enlargement or 

nodularity. 


CARDIOVASCULAR: Irregular rate and irregular rhythm, no murmur.  No JVD. 

Peripheral pulses symmetric.


RESPIRATORY/CHEST: Dyspnea,tachpneic, shallow breaths. 


GASTROINTESTINAL: Abdomen soft, round, non-tender, nondistended. No hepato-

splenomegaly. No guarding.  


MUSCULOSKELETAL: Atrophy in bilateral lower extremities. Extremities without 

clubbing, cyanosis, or edema.+ chronic vascular skin changes BLE. No joint 

effusion, erythema or tenderness to exam bilateral knees 


NEUROLOGICAL:   Unable to follow commands or moves all 4 extremities, tachpniec

, minimally responsive. 


PSYCHIATRIC:. No obvious anxiety/depression. 





.





Diagnostic Tests


Laboratory





Laboratory Tests








Test


  2/9/18


23:40 2/10/18


13:50 2/10/18


13:58 2/11/18


21:29


 


Hemoglobin


  8.4 GM/DL


(13.0-17.0) 11.0 GM/DL


(13.0-17.0) 


  10.4 GM/DL


(13.0-17.0)


 


Hematocrit


  25.3 %


(39.0-51.0) 33.1 %


(39.0-51.0) 


  30.9 %


(39.0-51.0)


 


White Blood Count


  


  7.8 TH/MM3


(4.0-11.0) 


  7.4 TH/MM3


(4.0-11.0)


 


Red Blood Count


  


  3.55 MIL/MM3


(4.50-5.90) 


  3.29 MIL/MM3


(4.50-5.90)


 


Mean Corpuscular Volume


  


  93.3 FL


(80.0-100.0) 


  94.1 FL


(80.0-100.0)


 


Mean Corpuscular Hemoglobin


  


  31.0 PG


(27.0-34.0) 


  31.8 PG


(27.0-34.0)


 


Mean Corpuscular Hemoglobin


Concent 


  33.2 %


(32.0-36.0) 


  33.8 %


(32.0-36.0)


 


Red Cell Distribution Width


  


  21.0 %


(11.6-17.2) 


  20.7 %


(11.6-17.2)


 


Platelet Count


  


  170 TH/MM3


(150-450) 


  160 TH/MM3


(150-450)


 


Mean Platelet Volume


  


  7.6 FL


(7.0-11.0) 


  7.9 FL


(7.0-11.0)


 


Neutrophils (%) (Auto)


  


  83.9 %


(16.0-70.0) 


  89.2 %


(16.0-70.0)


 


Lymphocytes (%) (Auto)


  


  6.3 %


(9.0-44.0) 


  4.2 %


(9.0-44.0)


 


Monocytes (%) (Auto)


  


  9.4 %


(0.0-8.0) 


  5.3 %


(0.0-8.0)


 


Eosinophils (%) (Auto)


  


  0.2 %


(0.0-4.0) 


  1.0 %


(0.0-4.0)


 


Basophils (%) (Auto)


  


  0.2 %


(0.0-2.0) 


  0.3 %


(0.0-2.0)


 


Neutrophils # (Auto)


  


  6.5 TH/MM3


(1.8-7.7) 


  6.6 TH/MM3


(1.8-7.7)


 


Lymphocytes # (Auto)


  


  0.5 TH/MM3


(1.0-4.8) 


  0.3 TH/MM3


(1.0-4.8)


 


Monocytes # (Auto)


  


  0.7 TH/MM3


(0-0.9) 


  0.4 TH/MM3


(0-0.9)


 


Eosinophils # (Auto)


  


  0.0 TH/MM3


(0-0.4) 


  0.1 TH/MM3


(0-0.4)


 


Basophils # (Auto)


  


  0.0 TH/MM3


(0-0.2) 


  0.0 TH/MM3


(0-0.2)


 


CBC Comment  DIFF FINAL   DIFF FINAL 


 


Differential Comment      


 


Blood Urea Nitrogen


  


  


  50 MG/DL


(7-18) 66 MG/DL


(7-18)


 


Creatinine


  


  


  3.39 MG/DL


(0.60-1.30) 4.14 MG/DL


(0.60-1.30)


 


Random Glucose


  


  


  119 MG/DL


() 203 MG/DL


()


 


Total Protein


  


  


  5.4 GM/DL


(6.4-8.2) 


 


 


Calcium Level


  


  


  7.4 MG/DL


(8.5-10.1) 7.8 MG/DL


(8.5-10.1)


 


Magnesium Level


  


  


  2.2 MG/DL


(1.5-2.5) 2.3 MG/DL


(1.5-2.5)


 


Sodium Level


  


  


  140 MEQ/L


(136-145) 140 MEQ/L


(136-145)


 


Potassium Level


  


  


  4.6 MEQ/L


(3.5-5.1) 4.7 MEQ/L


(3.5-5.1)


 


Chloride Level


  


  


  100 MEQ/L


() 100 MEQ/L


()


 


Carbon Dioxide Level


  


  


  33.6 MEQ/L


(21.0-32.0) 30.2 MEQ/L


(21.0-32.0)


 


Anion Gap


  


  


  6 MEQ/L (5-15) 


  10 MEQ/L


(5-15)


 


Estimat Glomerular Filtration


Rate 


  


  18 ML/MIN


(>89) 15 ML/MIN


(>89)


 


Protein Corrected Calcium


  


  


  8.3 MG/DL


(8.5-10.1) 


 


 


Test


  2/12/18


07:23 2/12/18


07:38 


  


 


 


White Blood Count


  6.2 TH/MM3


(4.0-11.0) 


  


  


 


 


Red Blood Count


  1.76 MIL/MM3


(4.50-5.90) 


  


  


 


 


Hemoglobin


  5.6 GM/DL


(13.0-17.0) 


  


  


 


 


Hematocrit


  16.7 %


(39.0-51.0) 


  


  


 


 


Mean Corpuscular Volume


  94.9 FL


(80.0-100.0) 


  


  


 


 


Mean Corpuscular Hemoglobin


  32.0 PG


(27.0-34.0) 


  


  


 


 


Mean Corpuscular Hemoglobin


Concent 33.7 %


(32.0-36.0) 


  


  


 


 


Red Cell Distribution Width


  20.3 %


(11.6-17.2) 


  


  


 


 


Platelet Count


  162 TH/MM3


(150-450) 


  


  


 


 


Mean Platelet Volume


  7.9 FL


(7.0-11.0) 


  


  


 


 


Neutrophils (%) (Auto)


  87.3 %


(16.0-70.0) 


  


  


 


 


Lymphocytes (%) (Auto)


  5.0 %


(9.0-44.0) 


  


  


 


 


Monocytes (%) (Auto)


  6.1 %


(0.0-8.0) 


  


  


 


 


Eosinophils (%) (Auto)


  1.3 %


(0.0-4.0) 


  


  


 


 


Basophils (%) (Auto)


  0.3 %


(0.0-2.0) 


  


  


 


 


Neutrophils # (Auto)


  5.5 TH/MM3


(1.8-7.7) 


  


  


 


 


Lymphocytes # (Auto)


  0.3 TH/MM3


(1.0-4.8) 


  


  


 


 


Monocytes # (Auto)


  0.4 TH/MM3


(0-0.9) 


  


  


 


 


Eosinophils # (Auto)


  0.1 TH/MM3


(0-0.4) 


  


  


 


 


Basophils # (Auto)


  0.0 TH/MM3


(0-0.2) 


  


  


 


 


CBC Comment AUTO DIFF    


 


Differential Comment


  AUTO DIFF


CONFIRMED 


  


  


 


 


Prothrombin Time


  14.8 SEC


(9.8-11.6) 


  


  


 


 


Prothromb Time International


Ratio 1.5 RATIO 


  


  


  


 


 


Activated Partial


Thromboplast Time 32.0 SEC


(24.3-30.1) 


  


  


 


 


Fibrinogen


  199 mg/dL


(227-377) 


  


  


 


 


Blood Urea Nitrogen


  69 MG/DL


(7-18) 


  


  


 


 


Creatinine


  4.06 MG/DL


(0.60-1.30) 


  


  


 


 


Random Glucose


  192 MG/DL


() 


  


  


 


 


Total Protein


  3.2 GM/DL


(6.4-8.2) 


  


  


 


 


Albumin


  1.4 GM/DL


(3.4-5.0) 


  


  


 


 


Calcium Level


  6.5 MG/DL


(8.5-10.1) 


  


  


 


 


Alkaline Phosphatase


  157 U/L


() 


  


  


 


 


Aspartate Amino Transf


(AST/SGOT) 14 U/L (15-37) 


  


  


  


 


 


Alanine Aminotransferase


(ALT/SGPT) 10 U/L (12-78) 


  


  


  


 


 


Total Bilirubin


  0.5 MG/DL


(0.2-1.0) 


  


  


 


 


Sodium Level


  142 MEQ/L


(136-145) 


  


  


 


 


Potassium Level


  5.4 MEQ/L


(3.5-5.1) 


  


  


 


 


Chloride Level


  104 MEQ/L


() 


  


  


 


 


Carbon Dioxide Level


  30.0 MEQ/L


(21.0-32.0) 


  


  


 


 


Anion Gap 8 MEQ/L (5-15)    


 


Estimat Glomerular Filtration


Rate 15 ML/MIN


(>89) 


  


  


 


 


Protein Corrected Calcium


  8.7 MG/DL


(8.5-10.1) 


  


  


 


 


Magnesium Level


  2.2 MG/DL


(1.5-2.5) 


  


  


 


 


Total Creatine Kinase


  29 U/L


() 


  


  


 


 


Digoxin Level


  0.5 NG/ML


(0.8-2.0) 


  


  


 


 


Lactic Acid Level


  


  3.9 mmol/L


(0.4-2.0) 


  


 








Result Diagram:  


2/12/18 0723 2/12/18 0723





Microbiology





Microbiology








 Date/Time


Source Procedure


Growth Status


 


 


 2/9/18 22:00


Stool Stool Stool Occult Blood (CONOR) - Final


HEMOCCULT POSITIVE Complete








Imaging





Last Impressions








Chest X-Ray 2/12/18 0721 Signed





Impressions: 





 Service Date/Time:  Monday, February 12, 2018 07:22 - CONCLUSION:  1. Right IJ 





 central line in good position without pneumothorax. 2. Left lower lobe 

airspace 





 disease and slightly worsening pleural effusion. 3. Improved aeration in the 





 right lower lung zone.     Juancho Lockhart MD 








Procedures


1/26/18 EGD





Assessment and Plan


Disease Oriented Problem List:  


(1) Atrial fibrillation


(2) Sacral decubitus ulcer


(3) Heel ulceration


(4) Hypotension


(5) GI bleed


(6) Anemia


(7) ESRD (end stage renal disease) on dialysis


Symptom Scale:  


(1) Weakness


0-10 Scale:  Unable to quantify





Pertinent Non-Medical Issues


Psychosocial: to his wife x 6 yrs. Has 1 adult son from prior marriage, 

who is incarcerated in [place]. He communicates with him every Sunday (son 

calls him). 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 His 

mother is still living there, and a sister. Moved to [place] in his 50s for 

[facility]. 


Spiritual:no particular affiliation, does not want  support


Legal:Patient has a healthcare surrogate document in his chart however this 

document actually names the patient as designated healthcare surrogate.  Per 

[place] statutes his wife would be appropriate legal proxy if patient 

incapacitated. 


1/26/18 assisted pt to complete new HCS, names his wife as HCS.


Ethical issues impacting care:


Important Contacts


spouse Yessy Meehan [phone]


.


Prognosis


This patient was admitted for hypotension, significant anemia, GI bleed.  He 

has known history of dialysis dependent end-stage renal disease.  Ongoing 

diagnostics, GI evaluation.  With ongoing aggressive treatments and 

interventions may be able to recover from current acute conditions.





.


Code Status:  No Code


Plan





* Legal decision maker:Patient has a healthcare surrogate document in his chart 

however this document actually names the patient as designated healthcare 

surrogate.  Per [place] statutes his wife would be appropriate legal proxy if 

patient incapacitated. Pt at time of my exam is oriented, appropriate and 

appears to have fair insight to his conditions/options. Appears able to make 

his own decisions . 1/26/18 assisted pt to complete new HCS, names his wife as 

HCS.


* Pt has no capacity to make medical decisions currently.


   


* Goals: 2/12/2018.  Transition to comfort measures only, no dialysis, blood 

products, or further procedures.  Consult Hospice


* CODE STATUS: DNR 


   


* SYMPTOMS:


   --weakness/debility- ongoing since Dec 25th. Limited to no ambulation since 

that time. Rec cont OT/PT to maintain/improve functional status


   --pain- today pt reports Rt knee pain, exam benign. pt wife reports he has 

chronic neuropathic sounding pain to BLE [place] at night, and chronic pain to 

bilateral chronic foot wounds. He has been on norco 5mg at home.  Pt 

hypotensive during hospital course, requiring pressor, cautious use of opiates. 

Consider resuming home norco or tramadol PRN if patient begins having pain. 


             - Tachpneia 2nd to decline, bleed.  Wife amenable to comfort meds.

  Will order Dilaudid and ativan for dyspnea and anxiety associated with 

dyspnea.





* Palliative care will continue to follow during hospital course as condition 

evolves, to assist patient/decision-maker with understanding of medical 

conditions, weighing benefits/burdens of treatment options, for clarification 

of goals of treatment.





Attestation


To help prompt me to consider important information that might be impacting 

today's encounter and assessment, information from prior notes written by 

myself or my colleagues may have been "brought forward" into today's note.  My 

signature on this note, however, is an attestation that I personally performed 

the exam, history, and/or decision-making noted today, and, unless otherwise 

indicated, the interactions with patient, family, and staff as well as the 

review of records all occurred today.  I also attest that the listed assessment 

and stated plan reflect my best clinical judgment today based on the 

combination of historical information, prior notes, and today's exam/ 

interactions.  When time spent is documented, it refers only to time spent 

today by the signer, or if indicated, combined time spent today by 

collaborating physician/nurse practitioner.











Sang Holley MD Feb 12, 2018 09:39

## 2018-02-12 NOTE — PD.RAD
Post Procedure Progress Note


Pre Procedure Diagnosis:  


(1) GI bleed


(2) Hypotension


Post Procedure Diagnosis:  


(1) GI bleed


(2) Hypotension


Procedure Date:


Feb 12, 2018


Supervising Radiologist:


Leonard Arriaza


Estimated blood loss:


2cc


Anesthesia:  Local, Conscious Sedation


Plan of Activity


Patient to Unit:  [facility]


Patient Condition:  Critical


Additional Comments:


66 y/o with know duodenal ulceration and GI bleed.





Angio completed. PT has severe, end stage vascular disease.


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 

origin.


SMA severely diseased but patent. Entire liver feeds from the GDA.


DARCI patent but severely diseased.


No active bleed evident.


GDA not embolized as it is providing blood flow to the liver.


See PACS Report for procedural detail/treatment











Leonard Arriaza MD Feb 12, 2018 09:03

## 2018-02-12 NOTE — HHI.DS
Death Summary Note


Date of Death:  Feb 12, 2018


Time Of Death:  14:12


Admission Date


Jan 26, 2018 at 02:11


Admitting Diagnosis





GI Bleed


Diagnosis at Time of Death:  


(1) GI bleed


ICD Code:  K92.2 - Gastrointestinal hemorrhage, unspecified


Diagnosis:  Principal





(2) Hypotension


ICD Code:  I95.9 - Hypotension, unspecified


Diagnosis:  Principal





(3) Peritonitis associated with peritoneal dialysis


ICD Code:  T85.71XA - Infection and inflammatory reaction due to peritoneal 

dialysis catheter, initial encounter


Diagnosis:  Secondary





Procedures


EGD and removal of PD catheter


Brief History


History of Present Illness


HPI





67 year-old male with a medical history significant for end-stage renal disease 

on peritoneal dialysis who was noted to be severely hypotensive and lethargic 

at the dialysis center on 1/25 and 911 was called and patient was transferred 

to [facility] ER.  On arrival he was noted to be hypotensive with SBP 70s 

and heart rate 100s.  He also reportedly had significant melena and rectal 

bleeding following arrival.  A right IJ central line was placed by ER 

physician.  Patient's wife arrived and wanted to make patient DNR status and 

did not want blood transfusions and wished transferring to hospice however 

subsequently patient woke up and wanted to continue aggressive care including 

blood transfusions.  ER physician contacted me questioning patient's wife's 

ability to make decisions for him due to concern for secondary gain as well as 

possible psychiatric illness.  Patient was accepted for admission by critical 

care medicine service.  4 units PRBCs were ordered to be transfused stat by ER 

physician.  When I arrived to see the patient he was laying in the ER stretcher 

on nasal cannula on 5 mics of Levophed.  He is drowsy but easily arousable.  He 

knew he was at the hospital.


He could not give me details of his history.  He did tell me that Dr. Hoang is 

his nephrologist.





[facility]


Past Medical History


Atrial Fibrillation:  Yes


Cancer:  No


Cardiovascular Problems:  Yes (a fib)


Diabetes:  Yes


Patient Takes Glucophage:  Yes (unknown)


Endocrine:  Yes


Genitourinary:  Yes (kidney failure left AV fistula, peritoneal dialysis 

catheter)


Hepatitis:  No


Hiatal Hernia:  No


Immune Disorder:  No


Medical other:  Yes


Musculoskeletal:  Yes (minor arthritis)


Neurologic:  Yes (hx strokes)


Psychiatric:  No


Reproductive:  No


Respiratory:  No


Renal Failure:  Yes


Thyroid Disease:  No





Past Surgical History


Abdominal Surgery:  Yes (appendectomy)


Appendectomy:  Yes


Body Medical Devices:  right double lumen catheter


Cardiac Surgery:  No


Ear Surgery:  No


Endocrine Surgery:  No


Eye Surgery:  Yes (CATARACTS REMOVED WITH LENS IMPLANTS)


Genitourinary Surgery:  No


Joint Replacement:  Yes (left hip)


Oral Surgery:  No


Pacemaker:  No


Thoracic Surgery:  Yes (double lumen catheter on the right side)





Social History


Alcohol Use:  No


Tobacco Use:  No


Substance Use:  No





Allergies-Medications


(Allergen,Severity, Reaction):  


Coded Allergies:  


     morphine (Unverified  Allergy, Severe, anaphylactic, 1/25/18)


Reported Meds & Prescriptions





Reported Meds & Active Scripts


Active


Active Prescriptions or Reported Medications Unobtainable    








Review of Systems


ROS Limitations:  Clinical Condition (Review of systems may not be extremely 

accurate secondary to the patient's clinical situation.)


Except as stated in HPI:  all other systems reviewed are Neg


General / Constitutional:  No: Fever, Chills


HENT:  No: Headaches, Lightheadedness, Neck Stiffness, Neck Pain


Cardiovascular:  No: Chest Pain or Discomfort, Palpitations


Respiratory:  No: Cough, Shortness of Breath


Gastrointestinal:  Positive: Diarrhea, No: Nausea, Vomiting


Genitourinary:  Positive: Other (Patient reports that he makes trace amount of 

urine.)


Musculoskeletal:  Positive: Weakness (Generalized), No: Pain


Neurologic:  Positive: Weakness, No: Headache (Generalized)


CBC/BMP:  


2/12/18 0723                                                                   

             2/12/18 0723





Significant Findings





Laboratory Tests








Test


  2/9/18


23:40 2/10/18


13:50 2/10/18


13:58 2/11/18


21:29


 


Hemoglobin


  8.4 GM/DL


(13.0-17.0) 11.0 GM/DL


(13.0-17.0) 


  10.4 GM/DL


(13.0-17.0)


 


Hematocrit


  25.3 %


(39.0-51.0) 33.1 %


(39.0-51.0) 


  30.9 %


(39.0-51.0)


 


Red Blood Count


  


  3.55 MIL/MM3


(4.50-5.90) 


  3.29 MIL/MM3


(4.50-5.90)


 


Red Cell Distribution Width


  


  21.0 %


(11.6-17.2) 


  20.7 %


(11.6-17.2)


 


Neutrophils (%) (Auto)


  


  83.9 %


(16.0-70.0) 


  89.2 %


(16.0-70.0)


 


Lymphocytes (%) (Auto)


  


  6.3 %


(9.0-44.0) 


  4.2 %


(9.0-44.0)


 


Monocytes (%) (Auto)


  


  9.4 %


(0.0-8.0) 


  


 


 


Lymphocytes # (Auto)


  


  0.5 TH/MM3


(1.0-4.8) 


  0.3 TH/MM3


(1.0-4.8)


 


Blood Urea Nitrogen


  


  


  50 MG/DL


(7-18) 66 MG/DL


(7-18)


 


Creatinine


  


  


  3.39 MG/DL


(0.60-1.30) 4.14 MG/DL


(0.60-1.30)


 


Random Glucose


  


  


  119 MG/DL


() 203 MG/DL


()


 


Total Protein


  


  


  5.4 GM/DL


(6.4-8.2) 


 


 


Calcium Level


  


  


  7.4 MG/DL


(8.5-10.1) 7.8 MG/DL


(8.5-10.1)


 


Carbon Dioxide Level


  


  


  33.6 MEQ/L


(21.0-32.0) 


 


 


Estimat Glomerular Filtration


Rate 


  


  18 ML/MIN


(>89) 15 ML/MIN


(>89)


 


Protein Corrected Calcium


  


  


  8.3 MG/DL


(8.5-10.1) 


 


 


Test


  2/12/18


07:23 2/12/18


07:38 


  


 


 


Red Blood Count


  1.76 MIL/MM3


(4.50-5.90) 


  


  


 


 


Hemoglobin


  5.6 GM/DL


(13.0-17.0) 


  


  


 


 


Hematocrit


  16.7 %


(39.0-51.0) 


  


  


 


 


Red Cell Distribution Width


  20.3 %


(11.6-17.2) 


  


  


 


 


Neutrophils (%) (Auto)


  87.3 %


(16.0-70.0) 


  


  


 


 


Lymphocytes (%) (Auto)


  5.0 %


(9.0-44.0) 


  


  


 


 


Lymphocytes # (Auto)


  0.3 TH/MM3


(1.0-4.8) 


  


  


 


 


Prothrombin Time


  14.8 SEC


(9.8-11.6) 


  


  


 


 


Activated Partial


Thromboplast Time 32.0 SEC


(24.3-30.1) 


  


  


 


 


Fibrinogen


  199 mg/dL


(227-377) 


  


  


 


 


Blood Urea Nitrogen


  69 MG/DL


(7-18) 


  


  


 


 


Creatinine


  4.06 MG/DL


(0.60-1.30) 


  


  


 


 


Random Glucose


  192 MG/DL


() 


  


  


 


 


Total Protein


  3.2 GM/DL


(6.4-8.2) 


  


  


 


 


Albumin


  1.4 GM/DL


(3.4-5.0) 


  


  


 


 


Calcium Level


  6.5 MG/DL


(8.5-10.1) 


  


  


 


 


Alkaline Phosphatase


  157 U/L


() 


  


  


 


 


Aspartate Amino Transf


(AST/SGOT) 14 U/L (15-37) 


  


  


  


 


 


Alanine Aminotransferase


(ALT/SGPT) 10 U/L (12-78) 


  


  


  


 


 


Potassium Level


  5.4 MEQ/L


(3.5-5.1) 


  


  


 


 


Estimat Glomerular Filtration


Rate 15 ML/MIN


(>89) 


  


  


 


 


Total Creatine Kinase


  29 U/L


() 


  


  


 


 


Digoxin Level


  0.5 NG/ML


(0.8-2.0) 


  


  


 


 


Lactic Acid Level


  


  3.9 mmol/L


(0.4-2.0) 


  


 








Imaging





Last Impressions








Chest X-Ray 2/11/18 0000 Signed





Impressions: 





 Service Date/Time:  Sunday, February 11, 2018 14:45 - CONCLUSION:  Development 





 of left basilar density in the majority probably representing effusion.     





 Teo Dobbs MD 








Hospital Course


Neuro/Psych: 





Depression


Chronic Pain syndrome


History of CVA 1986/2007





Acetaminophen 600 mg p.o. every 6 hours as needed fever/headache


Holding paroxetine 10 mg daily/home medication.  Resume when clinically 

indicated


Avoid sedatives and narcotics including tramadol/home medication, monitor neuro 

status. Awake and alert


Holding clopidogrel 75 mg daily.  





CV: 





Atrial fibrillation rate controlled


History of essential hypertension


Mild pulmonary hypertension





Phenylephrine at 60  g per minute-to maintain mean arterial pressure greater 

than equal 65


Monitor HR and BP keep MAP >65mmHg


Lactic acid 2.2 on 1/27


Holding clopidogrel 75 mg daily light of duodenal ulcer


Holding lisinopril 20 mill grams daily, isosorbide dinitrate 30 mg daily and 

metoprolol 25 mill grams daily in light of hypotension


random Cortisol level.  20


Continue Midodrine 10 mg 3 times a day


2D echocardiogram 2/11 revealed EF 55-60%.  Bilateral atrial enlargement.  RV 

dilatation.  PAP 47.7 mmHg.








Pulm: 





Left pleural effusion


Acute respiratory failure





Continue with oxygen keep sat >92%.  Currently on nonrebreather


Albuterol aerosols every 4 hours with albuterol aerosols every 2 hours when 

necessary dyspnea





GI/liver: 





Upper GI bleed secondary to duodenal ulcer





Currently n.p.o.


On pantoprazole drip at 8 mg an hour after receiving 80 mg bolus


s/p EGD 1/26 which showed 2 large duodenal ulcers, grade D esophagitis, 

gastritis.


Repeat EEG 2/10 --The esophagus this appeared to be unremarkable, stomach this 

too appeared to be unremarkable, duodenum there were 2 large ulcerations in the 

duodenal sweep and the first portion of the duodenum the larger of the 2 ulcers 

was the one that seen the first portion of the duodenum there was a visible 

vessel noted this was cauterized successfully using the Aubree probe


GI continue to follow


IR for possible i embolization n gastroduodenal artery today





Renal/: 





Status post PD catheter removal  Dr. Juarez


End-stage renal disease on hemodialysis M/W/F





Monitor renal function, I/O's, avoid nephrotoxins.


Pathology Dr. Hoang.  General Surgery has followed- Dr. Juarez fungal 

infection requiring PD catheter removal on 1/30. 





Heme:





Macrocytic anemia


Acute blood loss anemia





Monitor CBC, s/p transfusion 2units PRBC on 1/26.  Transfuse 8 units PRBCs 

since admission


Hgb s every 6 hours ordered.  Coags pending





Endocrine: 





Diabetes mellitus


Elevated TSH - repeat in 3-6 weeks.  Free T4 normal.  Free T3 low.  Would not 

initiate any levothyroxine at this time in this acute clinical setting





SSI for glycemic control with low Novulog every 6 hours


Holding Tradjenta 5 mg daily





ID: 





Infected peritoneal dialysis catheter/peritonitis fungal





Continue fluconazole 200 mg po daily stop date 2/12.


Monitor for signs of infections ( Fever, WBC) follow up on cultures.  

Peritoneal fluid 1/26 positive for yeast/Candida


ID is following.





MSK:  





Right heel ulcer


Sacral decubitus ulcer





2/4 Wound care consulted evaluate and treat


2/4 Transfer pt on Radha bed





Prophylaxis: Pantoprazole/no pharmacological prophylaxis in light of acute 

upper GI bleeding





Lines: Right IJ CV CVL 2/12- present





Critical Care:


The total critical care time was 35 minutes. Time to perform other separately 

billable procedures was not included in the critical care time.








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  GDA 

supplies the liver henceforth  cannot embolize.  Discussed with Dr. Juarez.  

He will have Dr. Rosa evaluate.  Discussed with Dr. Vazquez/GI.  Recommended 

general surgery versus esophageal/duodenal stent.  Discussed with wife Yessy Awan.  All 3 options including surgery which would likely include a partial 

gastrectomy duodenectomy, placement of esophageal stents as we do not have 

duodenal stents here versus comfort measures.  Discussed with palliative care 

Dr. Holley.  She wishes to proceed with comfort measures at this time.  Notify 

Dr. Moe Vazquez of wife's wishes.  Dr. Kennedy okay as long as wife is 

aware of options available which she has verified with myself and palliative 

care.  Consultation to General surgery canceled after discussing with Dr. Rosa.  Hospice consulted.  TOD 1412.











Chase Steen MD Feb 12, 2018 15:30

## 2021-04-23 NOTE — RADRPT
EXAM DATE/TIME:  02/12/2018 07:22 

 

[facility] COMPARISON:     

CHEST SINGLE AP, February 11, 2018, 14:45.

 

                     

INDICATIONS :     

Central line placement.

                     

 

MEDICAL HISTORY :            

Stroke. Renal disease, end stage. Renal failure, chronic. A-Fib   

 

SURGICAL HISTORY :     

Appendectomy.   

 

ENCOUNTER:     

Initial                                        

 

ACUITY:     

1 day      

 

PAIN SCORE:     

Non-responsive.

 

LOCATION:     

Bilateral chest 

 

FINDINGS:     

Right IJ central line with tip in the atriocaval junction. Persistent diffuse hazy left hemithorax op
acity and left lower lobe airspace consolidation. Improved aeration in the right lung. Cardiomediasti
nal contours are stable. Remainder of the exam is unchanged.

 

CONCLUSION:     

1. Right IJ central line in good position without pneumothorax.

2. Left lower lobe airspace disease and slightly worsening pleural effusion.

3. Improved aeration in the right lower lung zone. 

 

 

 Juancho Lockhart MD on February 12, 2018 at 7:48           

Board Certified Radiologist.

 This report was verified electronically. absent